# Patient Record
Sex: MALE | URBAN - METROPOLITAN AREA
[De-identification: names, ages, dates, MRNs, and addresses within clinical notes are randomized per-mention and may not be internally consistent; named-entity substitution may affect disease eponyms.]

---

## 2019-12-06 ENCOUNTER — IMPORTED ENCOUNTER (OUTPATIENT)
Dept: URBAN - METROPOLITAN AREA CLINIC 9 | Facility: CLINIC | Age: 69
End: 2019-12-06

## 2021-02-22 ENCOUNTER — IMPORTED ENCOUNTER (OUTPATIENT)
Dept: URBAN - METROPOLITAN AREA CLINIC 9 | Facility: CLINIC | Age: 71
End: 2021-02-22

## 2021-10-18 ASSESSMENT — VISUAL ACUITY
OD_CC: 20/70 SN
OS_CC: 20/20 SN
OD_CC: 20/50 SN
OS_CC: 20/50 SN
OD_CC: 20/40 -2 SN
OS_CC: 20/25 -2 SN

## 2021-10-18 ASSESSMENT — TONOMETRY
OS_IOP_MMHG: 5
OD_IOP_MMHG: 12
OD_IOP_MMHG: 5
OS_IOP_MMHG: 14

## 2022-02-07 NOTE — PATIENT DISCUSSION
The patient was informed that with [address] [facility] for distance, they will need glasses for all near and intermediate activities after surgery. The patient understands there is a possibility they may need an enhancement after surgery. The patient will consult with Dr. Srinivas Tran and let us know if he wants to proceed with cataract surgery.

## 2022-02-07 NOTE — PATIENT DISCUSSION
Continue monitoring with Dr. Lukas Horton. The patient was advised that their best potential vision after cataract surgery will still be limited due to the macular degeneration.

## 2022-06-18 RX ORDER — LISINOPRIL AND HYDROCHLOROTHIAZIDE 25; 20 MG/1; MG/1
TABLET ORAL
COMMUNITY

## 2022-06-18 RX ORDER — ATENOLOL 25 MG/1
TABLET ORAL
COMMUNITY

## 2022-10-10 PROBLEM — G56.01 RIGHT CARPAL TUNNEL SYNDROME: Status: ACTIVE | Noted: 2022-10-10

## 2022-10-10 PROBLEM — M79.641 RIGHT HAND PAIN: Status: ACTIVE | Noted: 2022-10-10

## 2022-11-29 NOTE — PATIENT DISCUSSION
Continue monitoring with Dr. Mynor Catherine. The patient was advised that their best potential vision after cataract surgery will still be limited due to the macular degeneration.

## 2022-12-19 NOTE — PATIENT DISCUSSION
Continue monitoring with Dr. Usama Ascencio. The patient was advised that their best potential vision after cataract surgery will still be limited due to the macular degeneration.

## 2022-12-20 NOTE — PATIENT DISCUSSION
The patient was informed that with [address] [facility] for distance, they will need glasses for all near and intermediate activities after surgery. The patient understands there is a possibility they may need an enhancement after surgery. The patient elects [employer] [facility], goal of emmetropia.

## 2022-12-21 NOTE — PATIENT DISCUSSION
Lipid abnormalities are improving with lifestyle modifications.  Nutritional counseling was provided.  Lipids will be reassessed in 3 months.   The patient feels that the cataract is significantly impacting daily activities and has elected cataract surgery. The risks, benefits, and alternatives to surgery were discussed. The patient elects to proceed with surgery.

## 2022-12-21 NOTE — PATIENT DISCUSSION
The patient was informed that with [address] [facility] for distance, they will need glasses for all near and intermediate activities after surgery. The patient understands there is a possibility they may need an enhancement after surgery. The patient elects [employer] [facility], goal of emmetropia.

## 2023-04-05 DIAGNOSIS — F32.A DEPRESSION, UNSPECIFIED: ICD-10-CM

## 2023-04-05 DIAGNOSIS — N32.81 OVERACTIVE BLADDER: ICD-10-CM

## 2023-04-05 DIAGNOSIS — N40.0 BENIGN PROSTATIC HYPERPLASIA WITHOUT LOWER URINARY TRACT SYMPTOMS: ICD-10-CM

## 2023-04-05 DIAGNOSIS — G47.9 SLEEP DISORDER, UNSPECIFIED: ICD-10-CM

## 2023-04-05 RX ORDER — TIZANIDINE 2 MG/1
TABLET ORAL
COMMUNITY
Start: 2022-08-02 | End: 2023-05-31 | Stop reason: WASHOUT

## 2023-04-05 RX ORDER — TRAZODONE HYDROCHLORIDE 100 MG/1
TABLET ORAL
Qty: 90 TABLET | Refills: 3 | Status: SHIPPED | OUTPATIENT
Start: 2023-04-05 | End: 2023-06-01 | Stop reason: SDUPTHER

## 2023-04-05 RX ORDER — CETIRIZINE HYDROCHLORIDE 10 MG/1
1 TABLET ORAL DAILY
COMMUNITY
Start: 2022-02-21 | End: 2023-05-31 | Stop reason: WASHOUT

## 2023-04-05 RX ORDER — AMITRIPTYLINE HYDROCHLORIDE 25 MG/1
25 TABLET, FILM COATED ORAL NIGHTLY
COMMUNITY
Start: 2023-01-19 | End: 2023-08-31 | Stop reason: WASHOUT

## 2023-04-05 RX ORDER — MIRABEGRON 50 MG/1
1 TABLET, FILM COATED, EXTENDED RELEASE ORAL DAILY
COMMUNITY
Start: 2020-07-02 | End: 2023-05-31 | Stop reason: WASHOUT

## 2023-04-05 RX ORDER — ALFUZOSIN HYDROCHLORIDE 10 MG/1
TABLET, EXTENDED RELEASE ORAL
Qty: 90 TABLET | Refills: 1 | Status: SHIPPED | OUTPATIENT
Start: 2023-04-05 | End: 2023-05-31 | Stop reason: WASHOUT

## 2023-04-05 RX ORDER — ATORVASTATIN CALCIUM 40 MG/1
1 TABLET, FILM COATED ORAL DAILY
COMMUNITY
Start: 2019-01-10 | End: 2023-12-26 | Stop reason: ALTCHOICE

## 2023-04-05 RX ORDER — FLUOXETINE HYDROCHLORIDE 40 MG/1
1 CAPSULE ORAL DAILY
COMMUNITY
Start: 2015-07-22 | End: 2023-05-31 | Stop reason: WASHOUT

## 2023-04-05 RX ORDER — ASPIRIN 81 MG/1
1 TABLET ORAL DAILY
COMMUNITY
Start: 2022-12-14

## 2023-04-05 RX ORDER — GABAPENTIN 100 MG/1
1 CAPSULE ORAL NIGHTLY
COMMUNITY
Start: 2022-09-27 | End: 2024-02-01

## 2023-04-05 RX ORDER — SERTRALINE HYDROCHLORIDE 50 MG/1
TABLET, FILM COATED ORAL
Qty: 30 TABLET | Refills: 3 | Status: SHIPPED | OUTPATIENT
Start: 2023-04-05 | End: 2023-06-01 | Stop reason: SDUPTHER

## 2023-04-05 RX ORDER — FINASTERIDE 5 MG/1
TABLET, FILM COATED ORAL
Qty: 90 TABLET | Refills: 3 | Status: SHIPPED | OUTPATIENT
Start: 2023-04-05 | End: 2023-05-31 | Stop reason: WASHOUT

## 2023-04-05 RX ORDER — MELOXICAM 7.5 MG/1
TABLET ORAL
COMMUNITY
End: 2023-05-31 | Stop reason: WASHOUT

## 2023-04-05 RX ORDER — FINASTERIDE 5 MG/1
1 TABLET, FILM COATED ORAL DAILY
COMMUNITY
Start: 2016-05-31 | End: 2023-10-18 | Stop reason: SDUPTHER

## 2023-04-05 RX ORDER — ALFUZOSIN HYDROCHLORIDE 10 MG/1
1 TABLET, EXTENDED RELEASE ORAL DAILY
COMMUNITY
Start: 2016-05-31 | End: 2023-05-19 | Stop reason: SDUPTHER

## 2023-04-05 RX ORDER — TRAZODONE HYDROCHLORIDE 100 MG/1
1 TABLET ORAL NIGHTLY
COMMUNITY
Start: 2014-05-13 | End: 2023-05-31 | Stop reason: WASHOUT

## 2023-04-05 RX ORDER — ERGOCALCIFEROL 1.25 MG/1
1 CAPSULE ORAL WEEKLY
COMMUNITY
Start: 2014-06-03 | End: 2023-05-31 | Stop reason: WASHOUT

## 2023-04-05 RX ORDER — AMITRIPTYLINE HYDROCHLORIDE 25 MG/1
TABLET, FILM COATED ORAL
Qty: 30 TABLET | Refills: 3 | Status: SHIPPED | OUTPATIENT
Start: 2023-04-05 | End: 2023-05-31 | Stop reason: WASHOUT

## 2023-04-05 RX ORDER — SERTRALINE HYDROCHLORIDE 50 MG/1
1 TABLET, FILM COATED ORAL DAILY
COMMUNITY
Start: 2023-02-21 | End: 2023-05-19 | Stop reason: SDUPTHER

## 2023-04-28 ENCOUNTER — ESTABLISHED PATIENT (OUTPATIENT)
Dept: URBAN - METROPOLITAN AREA CLINIC 6 | Facility: CLINIC | Age: 73
End: 2023-04-28

## 2023-04-28 DIAGNOSIS — E11.9: ICD-10-CM

## 2023-04-28 DIAGNOSIS — H25.13: ICD-10-CM

## 2023-04-28 PROCEDURE — 92014 COMPRE OPH EXAM EST PT 1/>: CPT

## 2023-04-28 ASSESSMENT — VISUAL ACUITY
OU_CC: J2
OD_PH: 20/40
OD_CC: 20/70-1
OD_CC: J5
OS_PH: 20/40-2
OS_CC: J3
OS_CC: 20/80-1

## 2023-04-28 ASSESSMENT — TONOMETRY
OS_IOP_MMHG: 17
OD_IOP_MMHG: 17

## 2023-05-08 ENCOUNTER — PRE-OP/H&P (OUTPATIENT)
Dept: URBAN - METROPOLITAN AREA CLINIC 6 | Facility: CLINIC | Age: 73
End: 2023-05-08

## 2023-05-08 DIAGNOSIS — H25.13: ICD-10-CM

## 2023-05-08 PROCEDURE — 99211PRE PRE OP VISIT

## 2023-05-08 PROCEDURE — 92136 OPHTHALMIC BIOMETRY: CPT

## 2023-05-19 DIAGNOSIS — F41.9 ANXIETY: ICD-10-CM

## 2023-05-19 DIAGNOSIS — N40.0 BENIGN PROSTATIC HYPERPLASIA, UNSPECIFIED WHETHER LOWER URINARY TRACT SYMPTOMS PRESENT: ICD-10-CM

## 2023-05-19 RX ORDER — ALFUZOSIN HYDROCHLORIDE 10 MG/1
10 TABLET, EXTENDED RELEASE ORAL DAILY
Qty: 90 TABLET | Refills: 1 | Status: SHIPPED | OUTPATIENT
Start: 2023-05-19 | End: 2023-06-01 | Stop reason: SDUPTHER

## 2023-05-19 RX ORDER — SERTRALINE HYDROCHLORIDE 50 MG/1
50 TABLET, FILM COATED ORAL DAILY
Qty: 90 TABLET | Refills: 1 | Status: SHIPPED | OUTPATIENT
Start: 2023-05-19 | End: 2023-05-31 | Stop reason: WASHOUT

## 2023-05-31 ENCOUNTER — OFFICE VISIT (OUTPATIENT)
Dept: PRIMARY CARE | Facility: CLINIC | Age: 73
End: 2023-05-31
Payer: MEDICARE

## 2023-05-31 VITALS
HEART RATE: 90 BPM | HEIGHT: 71 IN | WEIGHT: 280.2 LBS | TEMPERATURE: 98.6 F | BODY MASS INDEX: 39.23 KG/M2 | DIASTOLIC BLOOD PRESSURE: 70 MMHG | OXYGEN SATURATION: 93 % | SYSTOLIC BLOOD PRESSURE: 122 MMHG

## 2023-05-31 DIAGNOSIS — R42 VERTIGO: Primary | ICD-10-CM

## 2023-05-31 DIAGNOSIS — N39.490 OVERFLOW INCONTINENCE OF URINE: ICD-10-CM

## 2023-05-31 PROCEDURE — 99214 OFFICE O/P EST MOD 30 MIN: CPT | Performed by: PHYSICIAN ASSISTANT

## 2023-05-31 PROCEDURE — 1036F TOBACCO NON-USER: CPT | Performed by: PHYSICIAN ASSISTANT

## 2023-05-31 PROCEDURE — 1159F MED LIST DOCD IN RCRD: CPT | Performed by: PHYSICIAN ASSISTANT

## 2023-05-31 PROCEDURE — 1160F RVW MEDS BY RX/DR IN RCRD: CPT | Performed by: PHYSICIAN ASSISTANT

## 2023-05-31 PROCEDURE — 3008F BODY MASS INDEX DOCD: CPT | Performed by: PHYSICIAN ASSISTANT

## 2023-05-31 RX ORDER — OXYBUTYNIN CHLORIDE 5 MG/1
5 TABLET ORAL 2 TIMES DAILY
Qty: 60 TABLET | Refills: 5 | Status: SHIPPED | OUTPATIENT
Start: 2023-05-31 | End: 2023-10-18 | Stop reason: SDUPTHER

## 2023-05-31 NOTE — PATIENT INSTRUCTIONS
Return for medicare annual visit.      Ways to Help Prevent Falls at Home    Quick Tips   ? Ask for help if you need it. Most people want to help!   ? Get up slowly after sitting or laying down   ? Wear a medical alert device or keep cell phone in your pocket   ? Use night lights, especially areas near a bathroom   ? Keep the items you use often within reach on a small stool or end table   ? Use an assistive device such as walker or cane, as directed by provider/physical therapy   ? Use a non-slip mat and grab bars in your bathroom. Look for home health sections for best options     Other Areas to Focus On   ? Exercise and nutrition: Regular exercise or taking a falls prevention class are great ways improve strength and balance. Don’t forget to stay hydrated and bring a snack!   ? Medicine side effects: Some medicines can make you sleepy or dizzy, which could cause a fall. Ask your healthcare provider about the side effects your medicines could cause. Be sure to let them know if you take any vitamins or supplements as well.   ? Tripping hazards: Remove items you could trip on, such as loose mats, rugs, cords, and clutter. Wear closed toe shoes with rubber soles.   ? Health and wellness: Get regular checkups with your healthcare provider, plus routine vision and hearing screenings. Talk with your healthcare provider about:   o Your medicines and the possible side effects - bring them in a bag if that is easier!   o Problems with balance or feeling dizzy   o Ways to promote bone health, such as Vitamin D and calcium supplements   o Questions or concerns about falling     *Ask your healthcare team if you have questions     [facility], 2022

## 2023-05-31 NOTE — PROGRESS NOTES
"Subjective   Patient ID: Vincent Ramirez is a 73 y.o. male who presents for New Patient Visit (Establish care), Shoulder Pain (Left shoulder ), and Balance issues  (Has been falliig).    HPI   Prefers to be called \"Car\"    Hx Nonhodgkin's lymphoma stage- resolved, concerned about weight gain & balance issues. He has had issues with balance, but denies sensation of spinning or rocking since chemo treatments. He states he squats down or bends to work on a car etc and sometimes tips over unexpectedly without realizing it.    Car eats a healthy diet with fruits and vegetables and minimal lean meats.     Sees pain mgt for neck/shoulder pain. On Gabapentin 100mg at bedtimefrom pain mgt- doesn't feel that this is very effective.      Dyslipidemia- lipitor  Depression- sertraline  Insomnia- trazodone  DM2- diet- no meds, monitor a1C  HTN- diet, monitor       Review of Systems  Review of Systems:   Constitutional: Denies fever  HEENT: Denies ST, earache  CVS: Denies Chest pain  Pulmonary: Denies wheezing, SOB  GI: Denies N/V  : Denies dysuria  Musculoskeletal:  Denies myalgia  Neuro: Denies focal weakness or numbness.  Skin: Denies Rashes.  *Review of Systems is negative unless otherwise mentioned in HPI or ROS above.    Objective   /70   Pulse 90   Temp 37 °C (98.6 °F)   Ht 1.803 m (5' 11\")   Wt 127 kg (280 lb 3.2 oz)   SpO2 93%   BMI 39.08 kg/m²     Physical Exam  Physical exam:  /70   Pulse 90   Temp 37 °C (98.6 °F)   Ht 1.803 m (5' 11\")   Wt 127 kg (280 lb 3.2 oz)   SpO2 93%   BMI 39.08 kg/m²  reviewed   Body mass index is 39.08 kg/m².     Constitutional: NAD.  Resting comfortably.  Head: Atraumatic, normocephalic.  EENT: deferred d/t masking  Cardiac: Regular rate & rhythm.   Pulmonary: Lungs clear bilat  GI: Rotund, Soft, Nontender, nondistended.   Musculoskeletal: No peripheral edema.   Skin: No evidence of trauma. No rashes  Psych: Intact judgement and insight.    Assessment/Plan "   Problem List Items Addressed This Visit    None  Visit Diagnoses       Vertigo    -  Primary    Relevant Orders    Referral to Physical Therapy    Overflow incontinence of urine        Relevant Medications    oxybutynin (Ditropan) 5 mg tablet             Patient was identified as a fall risk. Risk prevention instructions provided.

## 2023-06-01 ENCOUNTER — TELEPHONE (OUTPATIENT)
Dept: PRIMARY CARE | Facility: CLINIC | Age: 73
End: 2023-06-01
Payer: MEDICARE

## 2023-06-01 DIAGNOSIS — N40.0 BENIGN PROSTATIC HYPERPLASIA, UNSPECIFIED WHETHER LOWER URINARY TRACT SYMPTOMS PRESENT: ICD-10-CM

## 2023-06-01 DIAGNOSIS — F32.A DEPRESSION, UNSPECIFIED: ICD-10-CM

## 2023-06-01 DIAGNOSIS — G47.9 SLEEP DISORDER, UNSPECIFIED: ICD-10-CM

## 2023-06-01 RX ORDER — ALFUZOSIN HYDROCHLORIDE 10 MG/1
10 TABLET, EXTENDED RELEASE ORAL DAILY
Qty: 90 TABLET | Refills: 1 | Status: SHIPPED | OUTPATIENT
Start: 2023-06-01 | End: 2023-09-06 | Stop reason: SDUPTHER

## 2023-06-01 RX ORDER — TRAZODONE HYDROCHLORIDE 100 MG/1
100 TABLET ORAL NIGHTLY
Qty: 90 TABLET | Refills: 1 | Status: SHIPPED | OUTPATIENT
Start: 2023-06-01 | End: 2023-11-28 | Stop reason: SDUPTHER

## 2023-06-01 RX ORDER — SERTRALINE HYDROCHLORIDE 50 MG/1
50 TABLET, FILM COATED ORAL DAILY
Qty: 90 TABLET | Refills: 1 | Status: SHIPPED | OUTPATIENT
Start: 2023-06-01 | End: 2023-12-26 | Stop reason: ALTCHOICE

## 2023-07-06 ENCOUNTER — POST-OP (OUTPATIENT)
Dept: URBAN - METROPOLITAN AREA CLINIC 6 | Facility: CLINIC | Age: 73
End: 2023-07-06

## 2023-07-06 DIAGNOSIS — Z96.1: ICD-10-CM

## 2023-07-06 DIAGNOSIS — H25.11: ICD-10-CM

## 2023-07-06 PROCEDURE — 99024 POSTOP FOLLOW-UP VISIT: CPT

## 2023-07-06 PROCEDURE — 92136 OPHTHALMIC BIOMETRY: CPT

## 2023-07-06 ASSESSMENT — TONOMETRY: OS_IOP_MMHG: 10

## 2023-07-06 ASSESSMENT — VISUAL ACUITY: OS_SC: 20/30-2

## 2023-08-02 ENCOUNTER — TELEPHONE (OUTPATIENT)
Dept: PRIMARY CARE | Facility: CLINIC | Age: 73
End: 2023-08-02
Payer: MEDICARE

## 2023-08-02 DIAGNOSIS — H04.129 DRY EYE: Primary | ICD-10-CM

## 2023-08-08 LAB
AMPHETAMINE (PRESENCE) IN URINE BY SCREEN METHOD: NORMAL
BARBITURATES PRESENCE IN URINE BY SCREEN METHOD: NORMAL
BENZODIAZEPINE (PRESENCE) IN URINE BY SCREEN METHOD: NORMAL
CANNABINOIDS IN URINE BY SCREEN METHOD: NORMAL
COCAINE (PRESENCE) IN URINE BY SCREEN METHOD: NORMAL
DRUG SCREEN COMMENT URINE: NORMAL
FENTANYL URINE: NORMAL
METHADONE (PRESENCE) IN URINE BY SCREEN METHOD: NORMAL
OPIATES (PRESENCE) IN URINE BY SCREEN METHOD: NORMAL
OXYCODONE (PRESENCE) IN URINE BY SCREEN METHOD: NORMAL
PHENCYCLIDINE (PRESENCE) IN URINE BY SCREEN METHOD: NORMAL

## 2023-08-10 ENCOUNTER — POST-OP (OUTPATIENT)
Dept: URBAN - METROPOLITAN AREA SURGERY 6 | Facility: SURGERY | Age: 73
End: 2023-08-10

## 2023-08-10 DIAGNOSIS — Z96.1: ICD-10-CM

## 2023-08-10 PROCEDURE — 99024 POSTOP FOLLOW-UP VISIT: CPT

## 2023-08-10 ASSESSMENT — TONOMETRY: OD_IOP_MMHG: 10

## 2023-08-10 ASSESSMENT — VISUAL ACUITY
OD_SC: 20/60
OD_PH: 20/50

## 2023-08-21 ENCOUNTER — APPOINTMENT (OUTPATIENT)
Dept: PRIMARY CARE | Facility: CLINIC | Age: 73
End: 2023-08-21
Payer: MEDICARE

## 2023-08-28 ENCOUNTER — POST-OP (OUTPATIENT)
Dept: URBAN - METROPOLITAN AREA CLINIC 10 | Facility: CLINIC | Age: 73
End: 2023-08-28

## 2023-08-28 DIAGNOSIS — Z96.1: ICD-10-CM

## 2023-08-28 PROCEDURE — 92015 DETERMINE REFRACTIVE STATE: CPT

## 2023-08-28 PROCEDURE — 99024 POSTOP FOLLOW-UP VISIT: CPT

## 2023-08-28 ASSESSMENT — TONOMETRY
OD_IOP_MMHG: 5
OS_IOP_MMHG: 6

## 2023-08-28 ASSESSMENT — VISUAL ACUITY
OD_SC: 20/50
OS_SC: 20/30

## 2023-08-30 PROBLEM — K21.00 REFLUX ESOPHAGITIS: Status: ACTIVE | Noted: 2023-08-30

## 2023-08-30 PROBLEM — I10 BENIGN ESSENTIAL HYPERTENSION: Status: ACTIVE | Noted: 2023-08-30

## 2023-08-30 PROBLEM — M76.60 ACHILLES TENDINITIS: Status: RESOLVED | Noted: 2023-08-30 | Resolved: 2023-08-30

## 2023-08-30 PROBLEM — C85.90 NON HODGKIN'S LYMPHOMA (MULTI): Status: RESOLVED | Noted: 2023-08-30 | Resolved: 2023-08-30

## 2023-08-30 PROBLEM — N32.81 OVERACTIVE BLADDER: Status: ACTIVE | Noted: 2023-08-30

## 2023-08-30 PROBLEM — Z85.72 HISTORY OF NON-HODGKIN'S LYMPHOMA: Status: ACTIVE | Noted: 2023-08-30

## 2023-08-30 PROBLEM — N40.0 BPH (BENIGN PROSTATIC HYPERPLASIA): Status: ACTIVE | Noted: 2023-08-30

## 2023-08-30 PROBLEM — I44.7 LEFT BUNDLE BRANCH BLOCK (LBBB): Status: RESOLVED | Noted: 2023-08-30 | Resolved: 2023-08-30

## 2023-08-30 PROBLEM — R37 SEXUAL DYSFUNCTION: Status: ACTIVE | Noted: 2023-08-30

## 2023-08-30 PROBLEM — R97.20 ELEVATED PSA: Status: ACTIVE | Noted: 2023-08-30

## 2023-08-30 PROBLEM — G89.29 CHRONIC BACK PAIN: Status: ACTIVE | Noted: 2023-08-30

## 2023-08-30 PROBLEM — Z98.84 BARIATRIC SURGERY STATUS: Status: ACTIVE | Noted: 2023-08-30

## 2023-08-30 PROBLEM — G47.33 OSA (OBSTRUCTIVE SLEEP APNEA): Status: ACTIVE | Noted: 2023-08-30

## 2023-08-30 PROBLEM — E78.5 DYSLIPIDEMIA: Status: ACTIVE | Noted: 2023-08-30

## 2023-08-30 PROBLEM — E55.9 VITAMIN D DEFICIENCY: Status: ACTIVE | Noted: 2023-08-30

## 2023-08-30 PROBLEM — M54.17 LUMBOSACRAL NEURITIS: Status: ACTIVE | Noted: 2023-08-30

## 2023-08-30 PROBLEM — M54.9 CHRONIC BACK PAIN: Status: ACTIVE | Noted: 2023-08-30

## 2023-08-30 PROBLEM — F32.1 CURRENT MODERATE EPISODE OF MAJOR DEPRESSIVE DISORDER WITHOUT PRIOR EPISODE (MULTI): Status: ACTIVE | Noted: 2023-08-30

## 2023-08-30 PROBLEM — M54.2 CERVICALGIA: Status: ACTIVE | Noted: 2023-08-30

## 2023-08-30 PROBLEM — M51.26 DISC DISPLACEMENT, LUMBAR: Status: ACTIVE | Noted: 2023-08-30

## 2023-08-31 ENCOUNTER — OFFICE VISIT (OUTPATIENT)
Dept: PRIMARY CARE | Facility: CLINIC | Age: 73
End: 2023-08-31
Payer: MEDICARE

## 2023-08-31 VITALS
TEMPERATURE: 97.6 F | BODY MASS INDEX: 38.36 KG/M2 | HEIGHT: 71 IN | WEIGHT: 274 LBS | OXYGEN SATURATION: 96 % | SYSTOLIC BLOOD PRESSURE: 112 MMHG | HEART RATE: 76 BPM | DIASTOLIC BLOOD PRESSURE: 58 MMHG

## 2023-08-31 DIAGNOSIS — I10 BENIGN ESSENTIAL HYPERTENSION: ICD-10-CM

## 2023-08-31 DIAGNOSIS — I73.89 OTHER SPECIFIED PERIPHERAL VASCULAR DISEASES (CMS-HCC): ICD-10-CM

## 2023-08-31 DIAGNOSIS — Z98.84 BARIATRIC SURGERY STATUS: ICD-10-CM

## 2023-08-31 DIAGNOSIS — Z00.00 ROUTINE GENERAL MEDICAL EXAMINATION AT HEALTH CARE FACILITY: Primary | ICD-10-CM

## 2023-08-31 DIAGNOSIS — F32.1 CURRENT MODERATE EPISODE OF MAJOR DEPRESSIVE DISORDER WITHOUT PRIOR EPISODE (MULTI): ICD-10-CM

## 2023-08-31 DIAGNOSIS — E11.9 TYPE 2 DIABETES MELLITUS WITHOUT COMPLICATION, WITHOUT LONG-TERM CURRENT USE OF INSULIN (MULTI): ICD-10-CM

## 2023-08-31 DIAGNOSIS — E66.01 OBESITY, MORBID (MULTI): ICD-10-CM

## 2023-08-31 DIAGNOSIS — R45.84 ANHEDONIA: ICD-10-CM

## 2023-08-31 DIAGNOSIS — M81.0 OSTEOPOROSIS: ICD-10-CM

## 2023-08-31 PROCEDURE — 1126F AMNT PAIN NOTED NONE PRSNT: CPT | Performed by: PHYSICIAN ASSISTANT

## 2023-08-31 PROCEDURE — 1160F RVW MEDS BY RX/DR IN RCRD: CPT | Performed by: PHYSICIAN ASSISTANT

## 2023-08-31 PROCEDURE — 1170F FXNL STATUS ASSESSED: CPT | Performed by: PHYSICIAN ASSISTANT

## 2023-08-31 PROCEDURE — 3074F SYST BP LT 130 MM HG: CPT | Performed by: PHYSICIAN ASSISTANT

## 2023-08-31 PROCEDURE — 1036F TOBACCO NON-USER: CPT | Performed by: PHYSICIAN ASSISTANT

## 2023-08-31 PROCEDURE — 1159F MED LIST DOCD IN RCRD: CPT | Performed by: PHYSICIAN ASSISTANT

## 2023-08-31 PROCEDURE — 3078F DIAST BP <80 MM HG: CPT | Performed by: PHYSICIAN ASSISTANT

## 2023-08-31 PROCEDURE — 3008F BODY MASS INDEX DOCD: CPT | Performed by: PHYSICIAN ASSISTANT

## 2023-08-31 PROCEDURE — G0439 PPPS, SUBSEQ VISIT: HCPCS | Performed by: PHYSICIAN ASSISTANT

## 2023-08-31 RX ORDER — BUPROPION HYDROCHLORIDE 150 MG/1
150 TABLET ORAL EVERY MORNING
Qty: 90 TABLET | Refills: 0 | Status: SHIPPED | OUTPATIENT
Start: 2023-08-31 | End: 2023-10-18 | Stop reason: SINTOL

## 2023-08-31 RX ORDER — PIOGLITAZONEHYDROCHLORIDE 15 MG/1
15 TABLET ORAL DAILY
Qty: 90 TABLET | Refills: 3 | Status: SHIPPED | OUTPATIENT
Start: 2023-08-31 | End: 2023-10-18 | Stop reason: SINTOL

## 2023-08-31 ASSESSMENT — ACTIVITIES OF DAILY LIVING (ADL)
GROCERY_SHOPPING: INDEPENDENT
BATHING: INDEPENDENT
MANAGING_FINANCES: INDEPENDENT
DOING_HOUSEWORK: INDEPENDENT
DRESSING: INDEPENDENT
TAKING_MEDICATION: INDEPENDENT

## 2023-08-31 ASSESSMENT — PATIENT HEALTH QUESTIONNAIRE - PHQ9
SUM OF ALL RESPONSES TO PHQ9 QUESTIONS 1 AND 2: 0
2. FEELING DOWN, DEPRESSED OR HOPELESS: NOT AT ALL
1. LITTLE INTEREST OR PLEASURE IN DOING THINGS: NOT AT ALL

## 2023-08-31 NOTE — PROGRESS NOTES
Subjective     HPI   Vincent Ramirez is a 73 y.o. year old male patient with presenting to clinic with concern for   Chief Complaint   Patient presents with    Medicare Annual Wellness Visit Subsequent    Weight Loss     Would like to talk about something for weight loss. No motivation. Would like some labs done.      Need smoking history- LDCT??     Quit smoking >25yrs ago.  Due for DEXA for osteopenia      Due for A1c- diet controlled DM, check micro.      Concerned anbout weight. Needs help losing weight    Referred to physical therapy at last visit with concern for falls d/t balance issues related to chemo treatments for Non-Hodgkins Lymphoma (in remission).-- try meclizine?     Sees pain mgt for neck/shoulder pain. Has been seeing Carlene Uriarte.      Dyslipidemia- lipitor  Depression- sertraline  Insomnia- trazodone  DM2- diet- no meds, monitor a1C  HTN- diet, monitor       Patient Active Problem List   Diagnosis    Current moderate episode of major depressive disorder without prior episode (CMS/HCC)    Chronic back pain    Cervicalgia    BPH (benign prostatic hyperplasia)    Benign essential hypertension    Bariatric surgery status    Disc displacement, lumbar    Dyslipidemia    Elevated PSA    History of non-Hodgkin's lymphoma    Lumbosacral neuritis    VALDEZ (obstructive sleep apnea)    Sexual dysfunction    Reflux esophagitis    Overactive bladder    Vitamin D deficiency    Other specified peripheral vascular diseases (CMS/HCC)    Obesity, morbid (CMS/HCC)       Past Medical History:   Diagnosis Date    Personal history of non-Hodgkin lymphomas 02/13/2014    History of non-Hodgkin's lymphoma      Past Surgical History:   Procedure Laterality Date    APPENDECTOMY  07/27/2021    Appendectomy    HAND SURGERY  02/13/2014    Hand Surgery                                                                                                                                                          HERNIA REPAIR  07/27/2021     Hernia Repair    OTHER SURGICAL HISTORY  02/13/2014    Nasal Endoscopy Polypectomy    OTHER SURGICAL HISTORY  07/27/2021    Throat Surgery    TONSILLECTOMY  07/27/2021    Tonsillectomy      No family history on file.   Social History     Tobacco Use    Smoking status: Never    Smokeless tobacco: Never   Substance Use Topics    Alcohol use: Yes     Comment: sometimes        Current Outpatient Medications:     alfuzosin (Uroxatral) 10 mg 24 hr tablet, Take 1 tablet (10 mg) by mouth once daily., Disp: 90 tablet, Rfl: 1    aspirin 81 mg EC tablet, Take 1 tablet (81 mg) by mouth once daily., Disp: , Rfl:     atorvastatin (Lipitor) 40 mg tablet, Take 1 tablet (40 mg) by mouth once daily., Disp: , Rfl:     finasteride (Proscar) 5 mg tablet, Take 1 tablet (5 mg) by mouth once daily., Disp: , Rfl:     gabapentin (Neurontin) 100 mg capsule, Take 1 capsule (100 mg) by mouth once daily at bedtime., Disp: , Rfl:     oxybutynin (Ditropan) 5 mg tablet, Take 1 tablet (5 mg) by mouth 2 times a day., Disp: 60 tablet, Rfl: 5    sertraline (Zoloft) 50 mg tablet, Take 1 tablet (50 mg) by mouth once daily., Disp: 90 tablet, Rfl: 1    traZODone (Desyrel) 100 mg tablet, Take 1 tablet (100 mg) by mouth once daily at bedtime., Disp: 90 tablet, Rfl: 1    buPROPion XL (Wellbutrin XL) 150 mg 24 hr tablet, Take 1 tablet (150 mg) by mouth once daily in the morning. Do not crush, chew, or split., Disp: 90 tablet, Rfl: 0    pioglitazone (Actos) 15 mg tablet, Take 1 tablet (15 mg) by mouth once daily., Disp: 90 tablet, Rfl: 3     Review of Systems  Constitutional: Denies fever  HEENT: Denies ST, earache  CVS: Denies Chest pain  Pulmonary: Denies wheezing, SOB  GI: Denies N/V  : Denies dysuria  Musculoskeletal:  Denies myalgia  Neuro: Denies focal weakness or numbness.  Skin: Denies Rashes.  *Review of Systems is negative unless otherwise mentioned in HPI or ROS above.    Objective   /58   Pulse 76   Temp 36.4 °C (97.6 °F)   Ht 1.803 m (5'  "11\")   Wt 124 kg (274 lb)   SpO2 96%   BMI 38.22 kg/m²  reviewed Body mass index is 38.22 kg/m².     Physical Exam  Constitutional: NAD. Afebrile. Resting comfortably.  ENT: Nasal mucosa and oropharynx: moist oral mucosa. Posterior oropharynx nonerythematous. No posterior pharyngeal streaking.  TM: Bilat TM nonerythematous, pearly grey, landmarks intact. EAC wnl bilat.  Eyes: PERRLA. EOM intact.   Lymph: No anterior cervical chain or submandibular lymphadenopathy. No sentinel lymph nodes.  Cardiac: Regular rate & rhythm. No murmur, gallops, or rubs.  Pulmonary: Lungs clear to auscultation bilaterally with good aeration. No wheezes, rhonchi, or rales.   GI: Soft, Nontender, nondistended. No guarding. Normal BS x4.  : No suprapubic tenderness. No CVA tenderness to percussion.   Musculoskeletal: No peripheral edema.   Skin: No evidence of trauma. No rashes  Neuro: No focal neuro deficits. Normal gait without assistive devices.  Psych: Intact judgement and insight.      .Assessment/Plan   Problem List Items Addressed This Visit       Current moderate episode of major depressive disorder without prior episode (CMS/HCC)    Benign essential hypertension    Relevant Orders    Referral to Nutrition Services    Bariatric surgery status    Relevant Orders    Referral to Nutrition Services    Other specified peripheral vascular diseases (CMS/[facility])     Continue statin         Obesity, morbid (CMS/[facility])     Referred to nutritionist. Monitoring A1c. Increase physical exercise.           Other Visit Diagnoses       Routine general medical examination at health care facility    -  Primary    Type 2 diabetes mellitus without complication, without long-term current use of insulin (CMS/HCC)        Relevant Medications    pioglitazone (Actos) 15 mg tablet    Other Relevant Orders    Referral to Nutrition Services    Osteoporosis        Relevant Orders    XR DEXA bone density    Anhedonia        Relevant Medications    buPROPion XL " (Wellbutrin XL) 150 mg 24 hr tablet    BMI 38.0-38.9,adult        Relevant Medications    buPROPion XL (Wellbutrin XL) 150 mg 24 hr tablet

## 2023-09-05 DIAGNOSIS — F32.1 CURRENT MODERATE EPISODE OF MAJOR DEPRESSIVE DISORDER WITHOUT PRIOR EPISODE (MULTI): ICD-10-CM

## 2023-09-05 RX ORDER — FLUOXETINE HYDROCHLORIDE 40 MG/1
40 CAPSULE ORAL
Qty: 90 CAPSULE | Refills: 0 | Status: SHIPPED | OUTPATIENT
Start: 2023-09-05 | End: 2023-10-18 | Stop reason: SINTOL

## 2023-09-05 RX ORDER — FLUOXETINE HYDROCHLORIDE 40 MG/1
40 CAPSULE ORAL
Qty: 90 CAPSULE | Refills: 0 | Status: CANCELLED | OUTPATIENT
Start: 2023-09-05

## 2023-09-05 RX ORDER — FLUOXETINE HYDROCHLORIDE 40 MG/1
40 CAPSULE ORAL
COMMUNITY
End: 2023-09-05 | Stop reason: SDUPTHER

## 2023-09-06 DIAGNOSIS — N40.0 BENIGN PROSTATIC HYPERPLASIA, UNSPECIFIED WHETHER LOWER URINARY TRACT SYMPTOMS PRESENT: ICD-10-CM

## 2023-09-06 RX ORDER — ALFUZOSIN HYDROCHLORIDE 10 MG/1
10 TABLET, EXTENDED RELEASE ORAL DAILY
Qty: 90 TABLET | Refills: 1 | Status: SHIPPED | OUTPATIENT
Start: 2023-09-06 | End: 2023-12-26 | Stop reason: SDUPTHER

## 2023-10-18 ENCOUNTER — OFFICE VISIT (OUTPATIENT)
Dept: PRIMARY CARE | Facility: CLINIC | Age: 73
End: 2023-10-18
Payer: MEDICARE

## 2023-10-18 VITALS
SYSTOLIC BLOOD PRESSURE: 110 MMHG | WEIGHT: 272.2 LBS | TEMPERATURE: 98.3 F | HEIGHT: 71 IN | HEART RATE: 69 BPM | BODY MASS INDEX: 38.11 KG/M2 | OXYGEN SATURATION: 96 % | DIASTOLIC BLOOD PRESSURE: 70 MMHG

## 2023-10-18 DIAGNOSIS — N39.490 OVERFLOW INCONTINENCE OF URINE: ICD-10-CM

## 2023-10-18 DIAGNOSIS — E55.9 VITAMIN D DEFICIENCY: ICD-10-CM

## 2023-10-18 DIAGNOSIS — Z23 FLU VACCINE NEED: ICD-10-CM

## 2023-10-18 DIAGNOSIS — E11.9 TYPE 2 DIABETES MELLITUS WITHOUT COMPLICATION, WITHOUT LONG-TERM CURRENT USE OF INSULIN (MULTI): Primary | ICD-10-CM

## 2023-10-18 DIAGNOSIS — N32.81 OVERACTIVE BLADDER: ICD-10-CM

## 2023-10-18 LAB — POC HEMOGLOBIN A1C: 6 % (ref 4.2–6.5)

## 2023-10-18 PROCEDURE — G0008 ADMIN INFLUENZA VIRUS VAC: HCPCS | Performed by: PHYSICIAN ASSISTANT

## 2023-10-18 PROCEDURE — 1160F RVW MEDS BY RX/DR IN RCRD: CPT | Performed by: PHYSICIAN ASSISTANT

## 2023-10-18 PROCEDURE — 3008F BODY MASS INDEX DOCD: CPT | Performed by: PHYSICIAN ASSISTANT

## 2023-10-18 PROCEDURE — 90662 IIV NO PRSV INCREASED AG IM: CPT | Performed by: PHYSICIAN ASSISTANT

## 2023-10-18 PROCEDURE — 1036F TOBACCO NON-USER: CPT | Performed by: PHYSICIAN ASSISTANT

## 2023-10-18 PROCEDURE — 3074F SYST BP LT 130 MM HG: CPT | Performed by: PHYSICIAN ASSISTANT

## 2023-10-18 PROCEDURE — 1126F AMNT PAIN NOTED NONE PRSNT: CPT | Performed by: PHYSICIAN ASSISTANT

## 2023-10-18 PROCEDURE — 3078F DIAST BP <80 MM HG: CPT | Performed by: PHYSICIAN ASSISTANT

## 2023-10-18 PROCEDURE — 1159F MED LIST DOCD IN RCRD: CPT | Performed by: PHYSICIAN ASSISTANT

## 2023-10-18 PROCEDURE — 99214 OFFICE O/P EST MOD 30 MIN: CPT | Performed by: PHYSICIAN ASSISTANT

## 2023-10-18 PROCEDURE — 83036 HEMOGLOBIN GLYCOSYLATED A1C: CPT | Performed by: PHYSICIAN ASSISTANT

## 2023-10-18 RX ORDER — ERGOCALCIFEROL 1.25 MG/1
50000 CAPSULE ORAL WEEKLY
COMMUNITY
Start: 2013-12-03 | End: 2023-10-18 | Stop reason: SDUPTHER

## 2023-10-18 RX ORDER — FINASTERIDE 5 MG/1
5 TABLET, FILM COATED ORAL DAILY
Qty: 90 TABLET | Refills: 1 | Status: SHIPPED | OUTPATIENT
Start: 2023-10-18 | End: 2023-12-26 | Stop reason: ALTCHOICE

## 2023-10-18 RX ORDER — ERGOCALCIFEROL 1.25 MG/1
50000 CAPSULE ORAL
Qty: 30 CAPSULE | Refills: 1 | Status: SHIPPED | OUTPATIENT
Start: 2023-10-18 | End: 2024-02-06 | Stop reason: SDUPTHER

## 2023-10-18 RX ORDER — OXYBUTYNIN CHLORIDE 5 MG/1
5 TABLET ORAL 2 TIMES DAILY
Qty: 60 TABLET | Refills: 5 | Status: SHIPPED | OUTPATIENT
Start: 2023-10-18 | End: 2023-12-26 | Stop reason: SDUPTHER

## 2023-10-18 ASSESSMENT — PATIENT HEALTH QUESTIONNAIRE - PHQ9
2. FEELING DOWN, DEPRESSED OR HOPELESS: NOT AT ALL
SUM OF ALL RESPONSES TO PHQ9 QUESTIONS 1 AND 2: 0
1. LITTLE INTEREST OR PLEASURE IN DOING THINGS: NOT AT ALL

## 2023-10-18 NOTE — PROGRESS NOTES
Subjective     HPI   Vincent Ramirez is a 73 y.o. year old male patient with presenting to clinic with concern for   Chief Complaint   Patient presents with    Follow-up     6 wk.    Medication Problem     Has questions.     Immunizations     Rsv questions      I do recommend RSV vaccine.     Patient states that he is walking daily. He needs to increase activity to lose weight. [facility] friends of [email or web address], and [employer].      Concerned about weight. Needs help losing weight- started on wellbutrin. Abdominal cramps, dyspepsia. Referred to nutritionist.     Referred to physical therapy at last visit with concern for falls d/t balance issues related to chemo treatments for Non-Hodgkins Lymphoma (in remission).-- try meclizine?     Sees pain mgt for neck/shoulder pain. Has been seeing Carlene Uriarte.      Dyslipidemia- lipitor  Depression- sertraline  Insomnia- trazodone  HTN- diet, monitor  DMII- not tolerating Actos d/t GI distress. Concern for renal issues unable to take metformin.          Patient Active Problem List   Diagnosis    Current moderate episode of major depressive disorder without prior episode (CMS/HCC)    Chronic back pain    Cervicalgia    BPH (benign prostatic hyperplasia)    Benign essential hypertension    Bariatric surgery status    Disc displacement, lumbar    Dyslipidemia    Elevated PSA    History of non-Hodgkin's lymphoma    Lumbosacral neuritis    VALDEZ (obstructive sleep apnea)    Sexual dysfunction    Reflux esophagitis    Overactive bladder    Vitamin D deficiency    Other specified peripheral vascular diseases (CMS/HCC)    Obesity, morbid (CMS/HCC)       Past Medical History:   Diagnosis Date    Personal history of non-Hodgkin lymphomas 02/13/2014    History of non-Hodgkin's lymphoma      Past Surgical History:   Procedure Laterality Date    APPENDECTOMY  07/27/2021    Appendectomy    HAND SURGERY  02/13/2014    Hand Surgery                                                                                                                                                           HERNIA REPAIR  07/27/2021    Hernia Repair    OTHER SURGICAL HISTORY  02/13/2014    Nasal Endoscopy Polypectomy    OTHER SURGICAL HISTORY  07/27/2021    Throat Surgery    TONSILLECTOMY  07/27/2021    Tonsillectomy      Family History   Problem Relation Name Age of Onset    No Known Problems Other        Social History     Tobacco Use    Smoking status: Never    Smokeless tobacco: Never   Substance Use Topics    Alcohol use: Yes     Comment: sometimes        Current Outpatient Medications:     alfuzosin (Uroxatral) 10 mg 24 hr tablet, Take 1 tablet (10 mg) by mouth once daily., Disp: 90 tablet, Rfl: 1    aspirin 81 mg EC tablet, Take 1 tablet (81 mg) by mouth once daily., Disp: , Rfl:     atorvastatin (Lipitor) 40 mg tablet, Take 1 tablet (40 mg) by mouth once daily., Disp: , Rfl:     buPROPion XL (Wellbutrin XL) 150 mg 24 hr tablet, Take 1 tablet (150 mg) by mouth once daily in the morning. Do not crush, chew, or split., Disp: 90 tablet, Rfl: 0    ergocalciferol (Vitamin D-2) 1.25 MG (58401 UT) capsule, Take 1 capsule (50,000 Units) by mouth once a week., Disp: , Rfl:     finasteride (Proscar) 5 mg tablet, Take 1 tablet (5 mg) by mouth once daily., Disp: , Rfl:     FLUoxetine (PROzac) 40 mg capsule, Take 1 capsule (40 mg) by mouth once daily., Disp: 90 capsule, Rfl: 0    gabapentin (Neurontin) 100 mg capsule, Take 1 capsule (100 mg) by mouth once daily at bedtime., Disp: , Rfl:     oxybutynin (Ditropan) 5 mg tablet, Take 1 tablet (5 mg) by mouth 2 times a day., Disp: 60 tablet, Rfl: 5    pioglitazone (Actos) 15 mg tablet, Take 1 tablet (15 mg) by mouth once daily., Disp: 90 tablet, Rfl: 3    sertraline (Zoloft) 50 mg tablet, Take 1 tablet (50 mg) by mouth once daily., Disp: 90 tablet, Rfl: 1    traZODone (Desyrel) 100 mg tablet, Take 1 tablet (100 mg) by mouth once daily at bedtime., Disp: 90  "tablet, Rfl: 1     Review of Systems  Constitutional: Denies fever  HEENT: Denies ST, earache  CVS: Denies Chest pain  Pulmonary: Denies wheezing, SOB  GI: Denies N/V  : Denies dysuria  Musculoskeletal:  Denies myalgia  Neuro: Denies focal weakness or numbness.  Skin: Denies Rashes.  *Review of Systems is negative unless otherwise mentioned in HPI or ROS above.    Objective   /70   Pulse 69   Temp 36.8 °C (98.3 °F)   Ht 1.803 m (5' 11\")   Wt 123 kg (272 lb 3.2 oz)   SpO2 96%   BMI 37.96 kg/m²  reviewed Body mass index is 37.96 kg/m².     Physical Exam  Constitutional: NAD.  Resting comfortably.  Head: Atraumatic, normocephalic.  ENT: Moist oral mucosa. Nasal mucosa wnl.   Cardiac: Regular rate & rhythm.   Pulmonary: Lungs clear bilat  GI: Soft, Nontender, nondistended.   Musculoskeletal: No peripheral edema.   Skin: No evidence of trauma. No rashes  Psych: Intact judgement and insight.    .Assessment/Plan       "

## 2023-11-28 DIAGNOSIS — G47.9 SLEEP DISORDER, UNSPECIFIED: ICD-10-CM

## 2023-11-28 RX ORDER — TRAZODONE HYDROCHLORIDE 100 MG/1
100 TABLET ORAL NIGHTLY
Qty: 90 TABLET | Refills: 1 | Status: SHIPPED | OUTPATIENT
Start: 2023-11-28 | End: 2023-12-26 | Stop reason: SDUPTHER

## 2023-11-30 ENCOUNTER — TELEPHONE (OUTPATIENT)
Dept: PRIMARY CARE | Facility: CLINIC | Age: 73
End: 2023-11-30
Payer: MEDICARE

## 2023-11-30 DIAGNOSIS — U07.1 COVID-19: ICD-10-CM

## 2023-11-30 DIAGNOSIS — U07.1 COVID-19: Primary | ICD-10-CM

## 2023-11-30 NOTE — TELEPHONE ENCOUNTER
High risk patient for COVID. Known household exposure to COVID.     Car is not a strong candidate for PAXLOVID d/t multiple medication interactions.  I recommend Lagevrio instead.     Medication is recommended after consideration of your COVID status and risk factors including age, medical history, immunization status, severity of disease being mild to moderate, and duration of illness. We discussed the proper use of this medication in helping prevent severe illness from developing secondary to COVID including requiring hospitalization and ICU admission. You may have comorbidities that would make them a good candidate for medication treatment of COVID including consideration of PAXLOVID or Lagevrio.    PAXLOVID is currently the only FDA approved oral medication to treat COVID-19. There are several known medication interactions with Paxlovid discussed. PAXLOVID is used at a lower dose for people with severe chronic kidney disease. I will review the medication list in detail. The most common side effects are upset stomach/diarrhea, odd taste    Lagevrio is currently under FDA Emergency Use Authorization as of October 2023 and risk vs benefit of PAXLOVID vs Lagevrio vs supportive care was considered as discussed. regarding options including antiviral infusions and/or monoclonal antibody therapy which are no longer recommended due to inefficacy. The most common side effects are upset stomach with nausea and diarrhea, dizziness and headache. Lagevrio is recommended for Car due to high risk patient with medical comorbidities and multiple medication interactions with PAXLOVID.          Please stay well hydrated. Practice deep breathing often, walking in the home throughout course of illness and DVT prevention, and symptom management with tylenol/motrin as needed (unless you've been told to avoid NSAIDs) and cough medication.   Go to ER if emergent/urgent symptoms occur such as chest pain, shortness of breath, leg  swelling &/or pain, confusion, dizziness, etc    Isolate per CDC guidelines for COVID illness unless further medical care if needed. Isolation guidelines as of Oct 2023 are:  5 days isolation at home then you can return to activities on day 6-10 (masked) if improving symptoms and fever-free for 24 hours.     Quarantine of people you've been in contact with is no longer recommended regardless of their vaccination status.      Lagevrio fact sheet https://www.fda.gov/media/211997/download

## 2023-12-05 ENCOUNTER — TELEPHONE (OUTPATIENT)
Dept: PRIMARY CARE | Facility: CLINIC | Age: 73
End: 2023-12-05
Payer: MEDICARE

## 2023-12-05 NOTE — TELEPHONE ENCOUNTER
Pt advised to go to ED 12/5/23 LD  9:42am    Covid positive, he is short of breath with small periods of blackouts, has been rationing medication to make it last longer. Having fatigue and malaise. Ok to squeeze in for a telephone call?

## 2023-12-26 ENCOUNTER — OFFICE VISIT (OUTPATIENT)
Dept: PRIMARY CARE | Facility: CLINIC | Age: 73
End: 2023-12-26
Payer: MEDICARE

## 2023-12-26 VITALS
HEART RATE: 77 BPM | SYSTOLIC BLOOD PRESSURE: 119 MMHG | DIASTOLIC BLOOD PRESSURE: 73 MMHG | HEIGHT: 71 IN | BODY MASS INDEX: 38.64 KG/M2 | WEIGHT: 276 LBS | OXYGEN SATURATION: 92 %

## 2023-12-26 DIAGNOSIS — E55.9 VITAMIN D DEFICIENCY: ICD-10-CM

## 2023-12-26 DIAGNOSIS — N40.0 BENIGN PROSTATIC HYPERPLASIA, UNSPECIFIED WHETHER LOWER URINARY TRACT SYMPTOMS PRESENT: ICD-10-CM

## 2023-12-26 DIAGNOSIS — R53.83 FATIGUE, UNSPECIFIED TYPE: ICD-10-CM

## 2023-12-26 DIAGNOSIS — I10 BENIGN ESSENTIAL HYPERTENSION: ICD-10-CM

## 2023-12-26 DIAGNOSIS — Z13.9 DUE FOR SCREENING: ICD-10-CM

## 2023-12-26 DIAGNOSIS — E11.9 TYPE 2 DIABETES MELLITUS WITHOUT COMPLICATION, WITHOUT LONG-TERM CURRENT USE OF INSULIN (MULTI): Primary | ICD-10-CM

## 2023-12-26 DIAGNOSIS — E78.5 DYSLIPIDEMIA: ICD-10-CM

## 2023-12-26 DIAGNOSIS — Z11.59 NEED FOR HEPATITIS C SCREENING TEST: ICD-10-CM

## 2023-12-26 DIAGNOSIS — N39.490 OVERFLOW INCONTINENCE OF URINE: ICD-10-CM

## 2023-12-26 DIAGNOSIS — G47.9 SLEEP DISORDER, UNSPECIFIED: ICD-10-CM

## 2023-12-26 PROBLEM — R63.2 POLYPHAGIA: Status: ACTIVE | Noted: 2023-12-26

## 2023-12-26 PROBLEM — R09.81 SINUS CONGESTION: Status: ACTIVE | Noted: 2023-12-26

## 2023-12-26 PROBLEM — M79.672 BILATERAL LEG AND FOOT PAIN: Status: ACTIVE | Noted: 2023-12-26

## 2023-12-26 PROBLEM — R94.31 ABNORMAL EKG: Status: ACTIVE | Noted: 2023-12-26

## 2023-12-26 PROBLEM — M79.18 MYOFASCIAL PAIN: Status: ACTIVE | Noted: 2023-12-26

## 2023-12-26 PROBLEM — E78.00 PURE HYPERCHOLESTEROLEMIA: Status: ACTIVE | Noted: 2023-12-26

## 2023-12-26 PROBLEM — M79.671 BILATERAL LEG AND FOOT PAIN: Status: ACTIVE | Noted: 2023-12-26

## 2023-12-26 PROBLEM — R73.03 PREDIABETES: Status: ACTIVE | Noted: 2023-12-26

## 2023-12-26 PROBLEM — R09.81 NASAL CONGESTION: Status: ACTIVE | Noted: 2023-12-26

## 2023-12-26 PROBLEM — M79.605 BILATERAL LEG AND FOOT PAIN: Status: ACTIVE | Noted: 2023-12-26

## 2023-12-26 PROBLEM — M25.512 LEFT SHOULDER PAIN: Status: ACTIVE | Noted: 2023-12-26

## 2023-12-26 PROBLEM — F32.A DEPRESSION: Status: ACTIVE | Noted: 2023-12-26

## 2023-12-26 PROBLEM — J01.90 ACUTE SINUSITIS, UNSPECIFIED: Status: RESOLVED | Noted: 2023-12-26 | Resolved: 2023-12-26

## 2023-12-26 PROBLEM — M47.816 LUMBAR SPONDYLOSIS: Status: ACTIVE | Noted: 2023-12-26

## 2023-12-26 PROBLEM — R05.9 COUGH: Status: RESOLVED | Noted: 2023-12-26 | Resolved: 2023-12-26

## 2023-12-26 PROBLEM — M79.604 BILATERAL LEG AND FOOT PAIN: Status: ACTIVE | Noted: 2023-12-26

## 2023-12-26 PROBLEM — R73.09 ELEVATED GLUCOSE: Status: ACTIVE | Noted: 2023-12-26

## 2023-12-26 PROCEDURE — 3008F BODY MASS INDEX DOCD: CPT

## 2023-12-26 PROCEDURE — 1159F MED LIST DOCD IN RCRD: CPT

## 2023-12-26 PROCEDURE — 3078F DIAST BP <80 MM HG: CPT

## 2023-12-26 PROCEDURE — 1126F AMNT PAIN NOTED NONE PRSNT: CPT

## 2023-12-26 PROCEDURE — 1160F RVW MEDS BY RX/DR IN RCRD: CPT

## 2023-12-26 PROCEDURE — 1036F TOBACCO NON-USER: CPT

## 2023-12-26 PROCEDURE — 3074F SYST BP LT 130 MM HG: CPT

## 2023-12-26 PROCEDURE — 99214 OFFICE O/P EST MOD 30 MIN: CPT

## 2023-12-26 RX ORDER — OXYBUTYNIN CHLORIDE 5 MG/1
5 TABLET ORAL 2 TIMES DAILY
Qty: 180 TABLET | Refills: 1 | Status: SHIPPED | OUTPATIENT
Start: 2023-12-26 | End: 2024-06-23

## 2023-12-26 RX ORDER — TADALAFIL 5 MG/1
5 TABLET ORAL DAILY PRN
COMMUNITY
Start: 2019-10-18 | End: 2024-02-06 | Stop reason: ALTCHOICE

## 2023-12-26 RX ORDER — ALFUZOSIN HYDROCHLORIDE 10 MG/1
10 TABLET, EXTENDED RELEASE ORAL DAILY
Qty: 90 TABLET | Refills: 1 | Status: SHIPPED | OUTPATIENT
Start: 2023-12-26 | End: 2024-06-23

## 2023-12-26 RX ORDER — TRAZODONE HYDROCHLORIDE 100 MG/1
100 TABLET ORAL NIGHTLY
Qty: 90 TABLET | Refills: 1 | Status: SHIPPED | OUTPATIENT
Start: 2023-12-26 | End: 2024-02-06 | Stop reason: SDUPTHER

## 2023-12-26 ASSESSMENT — ENCOUNTER SYMPTOMS
ALLERGIC/IMMUNOLOGIC NEGATIVE: 1
MYALGIAS: 1
PSYCHIATRIC NEGATIVE: 1
NAUSEA: 0
SHORTNESS OF BREATH: 1
CONSTIPATION: 0
CONSTITUTIONAL NEGATIVE: 1
RHINORRHEA: 0
ARTHRALGIAS: 1
DIARRHEA: 0
VOMITING: 0
HEMATOLOGIC/LYMPHATIC NEGATIVE: 1
WHEEZING: 0
PALPITATIONS: 0
NEUROLOGICAL NEGATIVE: 1
BACK PAIN: 1
COUGH: 0
BLOOD IN STOOL: 0
CARDIOVASCULAR NEGATIVE: 1

## 2023-12-26 ASSESSMENT — PATIENT HEALTH QUESTIONNAIRE - PHQ9
2. FEELING DOWN, DEPRESSED OR HOPELESS: NOT AT ALL
1. LITTLE INTEREST OR PLEASURE IN DOING THINGS: NOT AT ALL
SUM OF ALL RESPONSES TO PHQ9 QUESTIONS 1 AND 2: 0

## 2023-12-26 NOTE — PATIENT INSTRUCTIONS
It was great to see you today!  Please continue taking your prescribed medications.   Refill have been sent to your pharmacy.    I have ordered some labs to be done as soon as you can.  We will call you with the results.    Please follow up in 6 weeks  --------------------   Yadi Dow, APRN-CNP,      Ways to Help Prevent Falls at Home    Quick Tips   ? Ask for help if you need it. Most people want to help!   ? Get up slowly after sitting or laying down   ? Wear a medical alert device or keep cell phone in your pocket   ? Use night lights, especially areas near a bathroom   ? Keep the items you use often within reach on a small stool or end table   ? Use an assistive device such as walker or cane, as directed by provider/physical therapy   ? Use a non-slip mat and grab bars in your bathroom. Look for home health sections for best options     Other Areas to Focus On   ? Exercise and nutrition: Regular exercise or taking a falls prevention class are great ways improve strength and balance. Don’t forget to stay hydrated and bring a snack!   ? Medicine side effects: Some medicines can make you sleepy or dizzy, which could cause a fall. Ask your healthcare provider about the side effects your medicines could cause. Be sure to let them know if you take any vitamins or supplements as well.   ? Tripping hazards: Remove items you could trip on, such as loose mats, rugs, cords, and clutter. Wear closed toe shoes with rubber soles.   ? Health and wellness: Get regular checkups with your healthcare provider, plus routine vision and hearing screenings. Talk with your healthcare provider about:   o Your medicines and the possible side effects - bring them in a bag if that is easier!   o Problems with balance or feeling dizzy   o Ways to promote bone health, such as Vitamin D and calcium supplements   o Questions or concerns about falling     *Ask your healthcare team if you have questions     ©[facility], 2022

## 2023-12-26 NOTE — PROGRESS NOTES
"Patient ID:   Vincent Ramirez \"Car\" is a 73 y.o. male with PMH remarkable for obesity, insomnia, BPH who presents to the office today for Establish Care.    Car is here as a new patient to establish care with this provider. He has no complaints at this time.  He has been struggling to control his weight, and has been attempting to increase his activities.  He recently committed to 3 months of daily workouts at [employer] but was discouraged by lack of results. He enjoys walking outdoors and plans of using the golf course near [place] to walk.  He does report SOB with activity such as walking>1 mile.  She has been following with pain management for back and shoulder pain from years of playing football. He is a retired .         HEALTH MAINTENANCE: Establish Care  Previous PCP: Maurizio  Smoking: no  Labs: ordered  PSA: ordered  Colonoscopy (45-75): Cologaurd 7/22 negative    SOCIAL HISTORY:  Social History     Tobacco Use    Smoking status: Never    Smokeless tobacco: Never   Substance Use Topics    Alcohol use: Yes     Comment: sometimes    Drug use: Never       IMMUNIZATIONS:  Immunization History   Administered Date(s) Administered    Flu vaccine (IIV4), preservative free *Check age/dose* 10/03/2018, 10/07/2019, 10/29/2020    Flu vaccine, quadrivalent, high-dose, preservative free, age 65y+ (FLUZONE) 10/12/2022, 10/18/2023    Influenza, High Dose Seasonal, Preservative Free 09/29/2021    Influenza, Unspecified 10/27/2000, 11/16/2011, 11/01/2012, 12/03/2013, 10/14/2014, 09/13/2017    Influenza, seasonal, injectable 10/14/2016    Influenza, seasonal, injectable, preservative free 09/01/2015    Maik SARS-CoV-2 Vaccination 03/11/2021    Pneumococcal Conjugate PCV 7 03/10/2012    Pneumococcal conjugate vaccine, 13-valent (PREVNAR 13) 01/03/2020    Pneumococcal polysaccharide vaccine, 23-valent, age 2 years and older (PNEUMOVAX 23) 04/23/2012, 12/23/2015, 02/21/2023    Tdap vaccine, age 7 year " and older (BOOSTRIX) 10/10/2008    Zoster, live 11/19/2014       REVIEW OF SYSTEMS:  Review of Systems   Constitutional: Negative.    HENT: Negative.  Negative for congestion and rhinorrhea.    Respiratory:  Positive for shortness of breath (with activity >1 mile). Negative for cough and wheezing.    Cardiovascular: Negative.  Negative for chest pain, palpitations and leg swelling.   Gastrointestinal:  Negative for blood in stool, constipation, diarrhea, nausea and vomiting.   Genitourinary: Negative.    Musculoskeletal:  Positive for arthralgias, back pain and myalgias.   Skin: Negative.    Allergic/Immunologic: Negative.    Neurological: Negative.    Hematological: Negative.    Psychiatric/Behavioral: Negative.         ALLERGIES:  Allergies   Allergen Reactions    Penicillins Other and Hives        VITAL SIGNS:  Vitals:    12/26/23 0823   BP: 119/73   Pulse: 77   SpO2: 92%       Physical Exam  Constitutional:       General: He is not in acute distress.     Appearance: Normal appearance. He is obese.   HENT:      Head: Normocephalic.      Nose: Nose normal.      Mouth/Throat:      Mouth: Mucous membranes are moist.      Pharynx: Oropharynx is clear.   Eyes:      Extraocular Movements: Extraocular movements intact.      Conjunctiva/sclera: Conjunctivae normal.      Pupils: Pupils are equal, round, and reactive to light.   Cardiovascular:      Rate and Rhythm: Normal rate and regular rhythm.      Pulses: Normal pulses.      Heart sounds: Normal heart sounds. No murmur heard.     No gallop.   Pulmonary:      Effort: Pulmonary effort is normal. No respiratory distress.      Breath sounds: Normal breath sounds. No wheezing, rhonchi or rales.   Abdominal:      General: Abdomen is flat. Bowel sounds are normal.      Palpations: Abdomen is soft.      Tenderness: There is no abdominal tenderness.   Musculoskeletal:         General: Normal range of motion.      Cervical back: Normal range of motion and neck supple.       Right lower leg: No edema.      Left lower leg: No edema.   Skin:     General: Skin is warm and dry.      Capillary Refill: Capillary refill takes less than 2 seconds.   Neurological:      General: No focal deficit present.      Mental Status: He is alert. Mental status is at baseline.   Psychiatric:         Mood and Affect: Mood normal.         Behavior: Behavior normal.         Thought Content: Thought content normal.         Judgment: Judgment normal.         MEDICATIONS:  Current Outpatient Medications on File Prior to Visit   Medication Sig Dispense Refill    aspirin 81 mg EC tablet Take 1 tablet (81 mg) by mouth once daily.      ergocalciferol (Vitamin D-2) 1.25 MG (56621 UT) capsule Take 1 capsule (50,000 Units) by mouth 1 (one) time per week. 30 capsule 1    gabapentin (Neurontin) 100 mg capsule Take 1 capsule (100 mg) by mouth once daily at bedtime.      tadalafil (Cialis) 5 mg tablet Take 1 tablet (5 mg) by mouth once daily as needed.      [DISCONTINUED] alfuzosin (Uroxatral) 10 mg 24 hr tablet Take 1 tablet (10 mg) by mouth once daily. 90 tablet 1    [DISCONTINUED] oxybutynin (Ditropan) 5 mg tablet Take 1 tablet (5 mg) by mouth 2 times a day. 60 tablet 5    [DISCONTINUED] traZODone (Desyrel) 100 mg tablet Take 1 tablet (100 mg) by mouth once daily at bedtime. 90 tablet 1    [DISCONTINUED] atorvastatin (Lipitor) 40 mg tablet Take 1 tablet (40 mg) by mouth once daily.      [DISCONTINUED] finasteride (Proscar) 5 mg tablet Take 1 tablet (5 mg) by mouth once daily. 90 tablet 1    [DISCONTINUED] semaglutide 0.25 mg or 0.5 mg (2 mg/3 mL) pen injector Inject 0.25 mg under the skin 1 (one) time per week for 30 days, THEN 0.5 mg 1 (one) time per week. 6 mL 0    [DISCONTINUED] sertraline (Zoloft) 50 mg tablet Take 1 tablet (50 mg) by mouth once daily. 90 tablet 1     No current facility-administered medications on file prior to visit.        LABORATORY DATA:  Lab Results   Component Value Date    WBC 9.9 02/20/2023     HGB 15.1 02/20/2023    HCT 46.1 02/20/2023     02/20/2023    CHOL 120 02/20/2023    TRIG 148 02/20/2023    HDL 38.8 (A) 02/20/2023    ALT 38 02/20/2023    AST 19 02/20/2023     02/20/2023    K 4.1 02/20/2023     02/20/2023    CREATININE 1.06 02/20/2023    BUN 11 02/20/2023    CO2 27 02/20/2023    TSH 1.68 08/25/2021    PSA 1.4 07/28/2020    INR 1.1 08/25/2021    HGBA1C 6.0 10/18/2023       ASSESSMENT AND PLAN:  Assessment/Plan   Diagnoses and all orders for this visit:  Type 2 diabetes mellitus without complication, without long-term current use of insulin (CMS/[facility])  -     semaglutide 0.25 mg or 0.5 mg (2 mg/3 mL) pen injector; Inject 0.25 mg under the skin 1 (one) time per week for 30 days, THEN 0.5 mg 1 (one) time per week.  -     Hemoglobin A1c; Future  -     Urinalysis with Reflex Microscopic; Future  Need for hepatitis C screening test  -     Hepatitis panel, acute; Future  Overflow incontinence of urine  -     oxybutynin (Ditropan) 5 mg tablet; Take 1 tablet (5 mg) by mouth 2 times a day.  Benign prostatic hyperplasia, unspecified whether lower urinary tract symptoms present  -     alfuzosin (Uroxatral) 10 mg 24 hr tablet; Take 1 tablet (10 mg) by mouth once daily.  -     PSA, total and free; Future  Sleep disorder, unspecified  -     traZODone (Desyrel) 100 mg tablet; Take 1 tablet (100 mg) by mouth once daily at bedtime.  Dyslipidemia  -     Lipid panel; Future  Due for screening  -     Iron and TIBC; Future  -     Ferritin; Future  -     Tsh With Reflex To Free T4 If Abnormal; Future  Vitamin D deficiency  -     Vitamin D 25-Hydroxy,Total (for eval of Vitamin D levels); Future  Fatigue, unspecified type  -     Vitamin B12; Future  Benign essential hypertension  -     Comprehensive metabolic panel; Future      It was great to see you today!  Please continue taking your prescribed medications.   Refill have been sent to your pharmacy.    I have ordered some labs to be done as soon as you  can.  We will call you with the results.    Please follow up in 6 weeks  --------------------   BRITTANY Boothe-JOSE MANUEL,

## 2024-01-03 ENCOUNTER — LAB (OUTPATIENT)
Dept: LAB | Facility: LAB | Age: 74
End: 2024-01-03
Payer: MEDICARE

## 2024-01-03 DIAGNOSIS — E78.5 DYSLIPIDEMIA: ICD-10-CM

## 2024-01-03 DIAGNOSIS — R53.83 FATIGUE, UNSPECIFIED TYPE: ICD-10-CM

## 2024-01-03 DIAGNOSIS — Z13.9 DUE FOR SCREENING: ICD-10-CM

## 2024-01-03 DIAGNOSIS — Z11.59 NEED FOR HEPATITIS C SCREENING TEST: ICD-10-CM

## 2024-01-03 DIAGNOSIS — I10 BENIGN ESSENTIAL HYPERTENSION: ICD-10-CM

## 2024-01-03 DIAGNOSIS — E55.9 VITAMIN D DEFICIENCY: ICD-10-CM

## 2024-01-03 DIAGNOSIS — E11.9 TYPE 2 DIABETES MELLITUS WITHOUT COMPLICATION, WITHOUT LONG-TERM CURRENT USE OF INSULIN (MULTI): ICD-10-CM

## 2024-01-03 DIAGNOSIS — N40.0 BENIGN PROSTATIC HYPERPLASIA, UNSPECIFIED WHETHER LOWER URINARY TRACT SYMPTOMS PRESENT: ICD-10-CM

## 2024-01-03 LAB
ALBUMIN SERPL BCP-MCNC: 4.6 G/DL (ref 3.4–5)
ALP SERPL-CCNC: 60 U/L (ref 33–136)
ALT SERPL W P-5'-P-CCNC: 32 U/L (ref 10–52)
ANION GAP SERPL CALC-SCNC: 12 MMOL/L (ref 10–20)
AST SERPL W P-5'-P-CCNC: 20 U/L (ref 9–39)
BILIRUB SERPL-MCNC: 1.4 MG/DL (ref 0–1.2)
BUN SERPL-MCNC: 15 MG/DL (ref 6–23)
CALCIUM SERPL-MCNC: 9.7 MG/DL (ref 8.6–10.3)
CHLORIDE SERPL-SCNC: 105 MMOL/L (ref 98–107)
CHOLEST SERPL-MCNC: 175 MG/DL (ref 0–199)
CHOLESTEROL/HDL RATIO: 4.1
CO2 SERPL-SCNC: 28 MMOL/L (ref 21–32)
CREAT SERPL-MCNC: 0.89 MG/DL (ref 0.5–1.3)
FERRITIN SERPL-MCNC: 113 NG/ML (ref 20–300)
GFR SERPL CREATININE-BSD FRML MDRD: 90 ML/MIN/1.73M*2
GLUCOSE SERPL-MCNC: 104 MG/DL (ref 74–99)
HDLC SERPL-MCNC: 43.1 MG/DL
IRON SATN MFR SERPL: 25 % (ref 25–45)
IRON SERPL-MCNC: 102 UG/DL (ref 35–150)
LDLC SERPL CALC-MCNC: 93 MG/DL
NON HDL CHOLESTEROL: 132 MG/DL (ref 0–149)
POTASSIUM SERPL-SCNC: 4.7 MMOL/L (ref 3.5–5.3)
PROT SERPL-MCNC: 7.2 G/DL (ref 6.4–8.2)
SODIUM SERPL-SCNC: 140 MMOL/L (ref 136–145)
TIBC SERPL-MCNC: 405 UG/DL (ref 240–445)
TRIGL SERPL-MCNC: 193 MG/DL (ref 0–149)
TSH SERPL-ACNC: 1.58 MIU/L (ref 0.44–3.98)
UIBC SERPL-MCNC: 303 UG/DL (ref 110–370)
VLDL: 39 MG/DL (ref 0–40)

## 2024-01-03 PROCEDURE — 80053 COMPREHEN METABOLIC PANEL: CPT

## 2024-01-03 PROCEDURE — 84153 ASSAY OF PSA TOTAL: CPT

## 2024-01-03 PROCEDURE — 81003 URINALYSIS AUTO W/O SCOPE: CPT

## 2024-01-03 PROCEDURE — 80061 LIPID PANEL: CPT

## 2024-01-03 PROCEDURE — 82607 VITAMIN B-12: CPT

## 2024-01-03 PROCEDURE — 83540 ASSAY OF IRON: CPT

## 2024-01-03 PROCEDURE — 80074 ACUTE HEPATITIS PANEL: CPT

## 2024-01-03 PROCEDURE — 83036 HEMOGLOBIN GLYCOSYLATED A1C: CPT

## 2024-01-03 PROCEDURE — 82728 ASSAY OF FERRITIN: CPT

## 2024-01-03 PROCEDURE — 36415 COLL VENOUS BLD VENIPUNCTURE: CPT

## 2024-01-03 PROCEDURE — 83550 IRON BINDING TEST: CPT

## 2024-01-03 PROCEDURE — 84443 ASSAY THYROID STIM HORMONE: CPT

## 2024-01-03 PROCEDURE — 82306 VITAMIN D 25 HYDROXY: CPT

## 2024-01-03 PROCEDURE — 84154 ASSAY OF PSA FREE: CPT

## 2024-01-04 LAB
25(OH)D3 SERPL-MCNC: 60 NG/ML (ref 30–100)
APPEARANCE UR: CLEAR
BILIRUB UR STRIP.AUTO-MCNC: NEGATIVE MG/DL
COLOR UR: YELLOW
EST. AVERAGE GLUCOSE BLD GHB EST-MCNC: 117 MG/DL
GLUCOSE UR STRIP.AUTO-MCNC: NEGATIVE MG/DL
HAV IGM SER QL: NONREACTIVE
HBA1C MFR BLD: 5.7 %
HBV CORE IGM SER QL: NONREACTIVE
HBV SURFACE AG SERPL QL IA: NONREACTIVE
HCV AB SER QL: NONREACTIVE
KETONES UR STRIP.AUTO-MCNC: NEGATIVE MG/DL
LEUKOCYTE ESTERASE UR QL STRIP.AUTO: NEGATIVE
NITRITE UR QL STRIP.AUTO: NEGATIVE
PH UR STRIP.AUTO: 5 [PH]
PROT UR STRIP.AUTO-MCNC: NEGATIVE MG/DL
RBC # UR STRIP.AUTO: NEGATIVE /UL
SP GR UR STRIP.AUTO: 1.02
UROBILINOGEN UR STRIP.AUTO-MCNC: <2 MG/DL
VIT B12 SERPL-MCNC: 439 PG/ML (ref 211–911)

## 2024-01-05 DIAGNOSIS — R97.20 ELEVATED PSA, LESS THAN 10 NG/ML: Primary | ICD-10-CM

## 2024-01-05 LAB
PSA FREE MFR SERPL: 11 %
PSA FREE SERPL-MCNC: 0.8 NG/ML
PSA SERPL IA-MCNC: 7.2 NG/ML (ref 0–4)

## 2024-01-16 DIAGNOSIS — F32.1 CURRENT MODERATE EPISODE OF MAJOR DEPRESSIVE DISORDER WITHOUT PRIOR EPISODE (MULTI): ICD-10-CM

## 2024-01-16 RX ORDER — SERTRALINE HYDROCHLORIDE 50 MG/1
50 TABLET, FILM COATED ORAL DAILY
Qty: 90 TABLET | Refills: 1 | Status: SHIPPED | OUTPATIENT
Start: 2024-01-16

## 2024-01-16 RX ORDER — SERTRALINE HYDROCHLORIDE 50 MG/1
50 TABLET, FILM COATED ORAL DAILY
COMMUNITY
End: 2024-01-16 | Stop reason: SDUPTHER

## 2024-01-18 ENCOUNTER — APPOINTMENT (OUTPATIENT)
Dept: PRIMARY CARE | Facility: CLINIC | Age: 74
End: 2024-01-18
Payer: MEDICARE

## 2024-01-23 ENCOUNTER — LAB (OUTPATIENT)
Dept: LAB | Facility: LAB | Age: 74
End: 2024-01-23
Payer: MEDICARE

## 2024-01-23 DIAGNOSIS — R97.20 ELEVATED PROSTATE SPECIFIC ANTIGEN (PSA): Primary | ICD-10-CM

## 2024-01-23 PROCEDURE — 84154 ASSAY OF PSA FREE: CPT

## 2024-01-23 PROCEDURE — 36415 COLL VENOUS BLD VENIPUNCTURE: CPT

## 2024-01-23 PROCEDURE — 84153 ASSAY OF PSA TOTAL: CPT

## 2024-01-25 LAB
PSA FREE MFR SERPL: 10 %
PSA FREE SERPL-MCNC: 0.8 NG/ML
PSA SERPL IA-MCNC: 7.9 NG/ML (ref 0–4)

## 2024-02-01 PROBLEM — M51.26 HERNIATED LUMBAR INTERVERTEBRAL DISC: Status: ACTIVE | Noted: 2022-10-10

## 2024-02-01 PROBLEM — R42 DIZZINESS AND GIDDINESS: Status: ACTIVE | Noted: 2024-02-01

## 2024-02-06 ENCOUNTER — OFFICE VISIT (OUTPATIENT)
Dept: PRIMARY CARE | Facility: CLINIC | Age: 74
End: 2024-02-06
Payer: MEDICARE

## 2024-02-06 VITALS
SYSTOLIC BLOOD PRESSURE: 120 MMHG | RESPIRATION RATE: 16 BRPM | BODY MASS INDEX: 37.56 KG/M2 | OXYGEN SATURATION: 94 % | HEIGHT: 71 IN | WEIGHT: 268.3 LBS | DIASTOLIC BLOOD PRESSURE: 69 MMHG | HEART RATE: 83 BPM

## 2024-02-06 DIAGNOSIS — E11.9 TYPE 2 DIABETES MELLITUS WITHOUT COMPLICATION, WITHOUT LONG-TERM CURRENT USE OF INSULIN (MULTI): ICD-10-CM

## 2024-02-06 DIAGNOSIS — G47.9 SLEEP DISORDER, UNSPECIFIED: ICD-10-CM

## 2024-02-06 DIAGNOSIS — E11.9 TYPE 2 DIABETES MELLITUS WITHOUT COMPLICATION, WITHOUT LONG-TERM CURRENT USE OF INSULIN (MULTI): Primary | ICD-10-CM

## 2024-02-06 DIAGNOSIS — E55.9 VITAMIN D DEFICIENCY: ICD-10-CM

## 2024-02-06 PROCEDURE — 99213 OFFICE O/P EST LOW 20 MIN: CPT

## 2024-02-06 PROCEDURE — 3008F BODY MASS INDEX DOCD: CPT

## 2024-02-06 PROCEDURE — 3044F HG A1C LEVEL LT 7.0%: CPT

## 2024-02-06 PROCEDURE — 3078F DIAST BP <80 MM HG: CPT

## 2024-02-06 PROCEDURE — 1159F MED LIST DOCD IN RCRD: CPT

## 2024-02-06 PROCEDURE — 3048F LDL-C <100 MG/DL: CPT

## 2024-02-06 PROCEDURE — 1126F AMNT PAIN NOTED NONE PRSNT: CPT

## 2024-02-06 PROCEDURE — 1160F RVW MEDS BY RX/DR IN RCRD: CPT

## 2024-02-06 PROCEDURE — 1157F ADVNC CARE PLAN IN RCRD: CPT

## 2024-02-06 PROCEDURE — 3074F SYST BP LT 130 MM HG: CPT

## 2024-02-06 PROCEDURE — 1036F TOBACCO NON-USER: CPT

## 2024-02-06 RX ORDER — TRAZODONE HYDROCHLORIDE 100 MG/1
100 TABLET ORAL NIGHTLY
Qty: 90 TABLET | Refills: 1 | Status: SHIPPED | OUTPATIENT
Start: 2024-02-06 | End: 2024-08-04

## 2024-02-06 RX ORDER — ERGOCALCIFEROL 1.25 MG/1
50000 CAPSULE ORAL
Qty: 30 CAPSULE | Refills: 1 | Status: SHIPPED | OUTPATIENT
Start: 2024-02-06

## 2024-02-06 ASSESSMENT — ENCOUNTER SYMPTOMS
PSYCHIATRIC NEGATIVE: 1
GASTROINTESTINAL NEGATIVE: 1
ALLERGIC/IMMUNOLOGIC NEGATIVE: 1
CONSTIPATION: 0
WHEEZING: 0
PALPITATIONS: 0
WEAKNESS: 0
CONSTITUTIONAL NEGATIVE: 1
COUGH: 0
CARDIOVASCULAR NEGATIVE: 1
TREMORS: 0
VOMITING: 0
BACK PAIN: 1
RHINORRHEA: 0
DYSURIA: 0
FATIGUE: 0
SHORTNESS OF BREATH: 1
DIARRHEA: 0
FEVER: 0
NEUROLOGICAL NEGATIVE: 1
DIZZINESS: 0
HEMATOLOGIC/LYMPHATIC NEGATIVE: 1

## 2024-02-06 ASSESSMENT — COLUMBIA-SUICIDE SEVERITY RATING SCALE - C-SSRS
6. HAVE YOU EVER DONE ANYTHING, STARTED TO DO ANYTHING, OR PREPARED TO DO ANYTHING TO END YOUR LIFE?: NO
1. IN THE PAST MONTH, HAVE YOU WISHED YOU WERE DEAD OR WISHED YOU COULD GO TO SLEEP AND NOT WAKE UP?: NO
2. HAVE YOU ACTUALLY HAD ANY THOUGHTS OF KILLING YOURSELF?: NO

## 2024-02-06 ASSESSMENT — PAIN SCALES - GENERAL: PAINLEVEL: 0-NO PAIN

## 2024-02-06 NOTE — PROGRESS NOTES
"Subjective   Patient ID: Vincent Ramirez \"Car\" is a 73 y.o. male who presents for Diabetes and medication follow up. Patient in today for a follow up on new medication. Patient states that he started Ozempic and is doing okay with it. Patient states that he takes it on Sunday and he feels a little sluggish until Monday but then it goes away. Patient denies any other reaction to the medication. Patient states that his shortness of breath is improving as well, states he can make up the stairs without feeling winded. Patient denies any other complaints or concerns at this time.   Today he is accompanied by alone.     Vincent is being seen today for follow-up on his diabetes.  6 weeks ago we started him on Ozempic, and he reports no side effects.  He states that the first day he takes it he feels a little punky but it resolves.  So we will continue 0.5 mg x 1 month, then increase to 1 mg in one month.  He has lost 8 pounds since his last appointment.  He states that he is feeling better.  No other complaints today.        Review of Systems   Constitutional: Negative.  Negative for fatigue and fever.   HENT: Negative.  Negative for congestion and rhinorrhea.    Respiratory:  Positive for shortness of breath (with activity). Negative for cough and wheezing.    Cardiovascular: Negative.  Negative for chest pain, palpitations and leg swelling.   Gastrointestinal: Negative.  Negative for constipation, diarrhea and vomiting.   Genitourinary: Negative.  Negative for dysuria and urgency.   Musculoskeletal:  Positive for back pain.   Skin: Negative.    Allergic/Immunologic: Negative.    Neurological: Negative.  Negative for dizziness, tremors and weakness.   Hematological: Negative.    Psychiatric/Behavioral: Negative.         Objective   /69 (BP Location: Right arm)   Pulse 83   Resp 16   Ht 1.803 m (5' 11\")   Wt 122 kg (268 lb 4.8 oz)   SpO2 94%   BMI 37.42 kg/m²   BSA: 2.47 meters squared  Growth percentiles: " "Facility age limit for growth %adry is 20 years. Facility age limit for growth %adry is 20 years.     Physical Exam  Vitals and nursing note reviewed.   Constitutional:       Appearance: Normal appearance. He is normal weight.   HENT:      Head: Normocephalic.      Nose: Nose normal.      Mouth/Throat:      Mouth: Mucous membranes are moist.      Pharynx: Oropharynx is clear.   Eyes:      Extraocular Movements: Extraocular movements intact.      Conjunctiva/sclera: Conjunctivae normal.      Pupils: Pupils are equal, round, and reactive to light.   Cardiovascular:      Rate and Rhythm: Normal rate and regular rhythm.      Pulses: Normal pulses.      Heart sounds: Normal heart sounds.   Pulmonary:      Effort: Pulmonary effort is normal.      Breath sounds: Normal breath sounds.   Abdominal:      General: Abdomen is flat. Bowel sounds are normal.      Palpations: Abdomen is soft.   Musculoskeletal:         General: Normal range of motion.      Cervical back: Normal range of motion and neck supple.   Skin:     General: Skin is warm and dry.      Capillary Refill: Capillary refill takes less than 2 seconds.   Neurological:      General: No focal deficit present.      Mental Status: He is alert. Mental status is at baseline.   Psychiatric:         Mood and Affect: Mood normal.         Behavior: Behavior normal.         Thought Content: Thought content normal.         Judgment: Judgment normal.       /69 (BP Location: Right arm)   Pulse 83   Resp 16   Ht 1.803 m (5' 11\")   Wt 122 kg (268 lb 4.8 oz)   SpO2 94%   BMI 37.42 kg/m²    Lab Results   Component Value Date    GLUCOSE 104 (H) 01/03/2024    CALCIUM 9.7 01/03/2024     01/03/2024    K 4.7 01/03/2024    CO2 28 01/03/2024     01/03/2024    BUN 15 01/03/2024    CREATININE 0.89 01/03/2024        Assessment/Plan   Problem List Items Addressed This Visit       Vitamin D deficiency    Relevant Medications    ergocalciferol (Vitamin D-2) 1.25 MG (79447 " UT) capsule     Other Visit Diagnoses       Type 2 diabetes mellitus without complication, without long-term current use of insulin (CMS/[facility])    -  Primary    Relevant Medications    semaglutide (OZEMPIC) 1 mg/dose (4 mg/3 mL) pen injector    Sleep disorder, unspecified        Relevant Medications    traZODone (Desyrel) 100 mg tablet          It was great to see you today!  Please continue taking your prescribed medications.   Refill have been sent to your pharmacy.    Please increase ozempic to 1 mg  Follow up in 2 months

## 2024-02-08 ENCOUNTER — APPOINTMENT (OUTPATIENT)
Dept: PRIMARY CARE | Facility: CLINIC | Age: 74
End: 2024-02-08
Payer: MEDICARE

## 2024-02-26 ENCOUNTER — ESTABLISHED PATIENT (OUTPATIENT)
Dept: URBAN - METROPOLITAN AREA CLINIC 10 | Facility: CLINIC | Age: 74
End: 2024-02-26

## 2024-02-26 DIAGNOSIS — Z96.1: ICD-10-CM

## 2024-02-26 DIAGNOSIS — H43.22: ICD-10-CM

## 2024-02-26 DIAGNOSIS — E11.9: ICD-10-CM

## 2024-02-26 DIAGNOSIS — S05.31XS: ICD-10-CM

## 2024-02-26 PROCEDURE — 92014 COMPRE OPH EXAM EST PT 1/>: CPT

## 2024-02-26 ASSESSMENT — VISUAL ACUITY
OS_CC: 20/25+2
OU_CC: 20/20
OD_CC: 20/80

## 2024-02-26 ASSESSMENT — TONOMETRY
OS_IOP_MMHG: 16
OD_IOP_MMHG: 20

## 2024-03-11 ENCOUNTER — APPOINTMENT (OUTPATIENT)
Dept: UROLOGY | Facility: CLINIC | Age: 74
End: 2024-03-11
Payer: MEDICARE

## 2024-03-18 DIAGNOSIS — E11.9 TYPE 2 DIABETES MELLITUS WITHOUT COMPLICATION, WITHOUT LONG-TERM CURRENT USE OF INSULIN (MULTI): ICD-10-CM

## 2024-04-05 ENCOUNTER — APPOINTMENT (OUTPATIENT)
Dept: PRIMARY CARE | Facility: CLINIC | Age: 74
End: 2024-04-05
Payer: MEDICARE

## 2024-04-18 ENCOUNTER — OFFICE VISIT (OUTPATIENT)
Dept: PRIMARY CARE | Facility: CLINIC | Age: 74
End: 2024-04-18
Payer: MEDICARE

## 2024-04-18 VITALS
BODY MASS INDEX: 36.89 KG/M2 | HEIGHT: 71 IN | HEART RATE: 76 BPM | WEIGHT: 263.5 LBS | DIASTOLIC BLOOD PRESSURE: 74 MMHG | SYSTOLIC BLOOD PRESSURE: 118 MMHG | OXYGEN SATURATION: 93 %

## 2024-04-18 DIAGNOSIS — C61 PROSTATE CANCER (MULTI): ICD-10-CM

## 2024-04-18 DIAGNOSIS — E66.01 OBESITY, MORBID (MULTI): ICD-10-CM

## 2024-04-18 DIAGNOSIS — I73.89 OTHER SPECIFIED PERIPHERAL VASCULAR DISEASES (CMS-HCC): ICD-10-CM

## 2024-04-18 DIAGNOSIS — E11.9 TYPE 2 DIABETES MELLITUS WITHOUT COMPLICATION, WITHOUT LONG-TERM CURRENT USE OF INSULIN (MULTI): ICD-10-CM

## 2024-04-18 DIAGNOSIS — H10.403 CHRONIC CONJUNCTIVITIS OF BOTH EYES, UNSPECIFIED CHRONIC CONJUNCTIVITIS TYPE: ICD-10-CM

## 2024-04-18 DIAGNOSIS — E11.69 TYPE 2 DIABETES MELLITUS WITH OTHER SPECIFIED COMPLICATION, WITHOUT LONG-TERM CURRENT USE OF INSULIN (MULTI): Primary | ICD-10-CM

## 2024-04-18 DIAGNOSIS — F32.1 CURRENT MODERATE EPISODE OF MAJOR DEPRESSIVE DISORDER WITHOUT PRIOR EPISODE (MULTI): ICD-10-CM

## 2024-04-18 LAB — POC HEMOGLOBIN A1C: 6.2 % (ref 4.2–6.5)

## 2024-04-18 RX ORDER — TOBRAMYCIN AND DEXAMETHASONE 3; 1 MG/ML; MG/ML
1 SUSPENSION/ DROPS OPHTHALMIC
Qty: 10 ML | Refills: 2 | Status: SHIPPED | OUTPATIENT
Start: 2024-04-18 | End: 2024-04-28

## 2024-04-18 ASSESSMENT — ENCOUNTER SYMPTOMS
NEUROLOGICAL NEGATIVE: 1
MUSCULOSKELETAL NEGATIVE: 1
ALLERGIC/IMMUNOLOGIC NEGATIVE: 1
CONSTITUTIONAL NEGATIVE: 1
PSYCHIATRIC NEGATIVE: 1
HEMATOLOGIC/LYMPHATIC NEGATIVE: 1
RESPIRATORY NEGATIVE: 1
GASTROINTESTINAL NEGATIVE: 1
CARDIOVASCULAR NEGATIVE: 1

## 2024-04-18 ASSESSMENT — COLUMBIA-SUICIDE SEVERITY RATING SCALE - C-SSRS
6. HAVE YOU EVER DONE ANYTHING, STARTED TO DO ANYTHING, OR PREPARED TO DO ANYTHING TO END YOUR LIFE?: NO
2. HAVE YOU ACTUALLY HAD ANY THOUGHTS OF KILLING YOURSELF?: NO
1. IN THE PAST MONTH, HAVE YOU WISHED YOU WERE DEAD OR WISHED YOU COULD GO TO SLEEP AND NOT WAKE UP?: NO

## 2024-04-18 ASSESSMENT — PATIENT HEALTH QUESTIONNAIRE - PHQ9
SUM OF ALL RESPONSES TO PHQ9 QUESTIONS 1 AND 2: 0
1. LITTLE INTEREST OR PLEASURE IN DOING THINGS: NOT AT ALL
2. FEELING DOWN, DEPRESSED OR HOPELESS: NOT AT ALL

## 2024-04-18 ASSESSMENT — PAIN SCALES - GENERAL: PAINLEVEL: 4

## 2024-04-18 NOTE — PROGRESS NOTES
"Subjective   Patient ID: Vincent Ramirez \"Car\" is a 73 y.o. male who presents for Diabetes. A1c was 6.2  Today he is accompanied by alone.     Car is here for follow-up appointment for his diabetes.  His A1c done today was 6.2.  He reports that he is tolerating the Ozempic well.  He has lost 5 pounds in the last 2 months.  He is a little frustrated that he has not lost more.  We will increase his Ozempic to 2 mg.  He reports that he did follow-up with urology for the elevated PSA.  They did do biopsies which showed he had cancer in one of the 12 biopsy sites.  He reports that urology is going to follow him closely and no treatment is needed at this point.  His blood pressure today was 118/74.  He reports that he struggles with chronic gritty goopy eyes.  He saw allergy last year who gave him tobramycin drops which were helpful.  He is asking for this to be refilled.  He has been unable to get into an ophthalmologist.        Review of Systems   Constitutional: Negative.    HENT: Negative.     Respiratory: Negative.     Cardiovascular: Negative.    Gastrointestinal: Negative.    Genitourinary: Negative.    Musculoskeletal: Negative.    Skin: Negative.    Allergic/Immunologic: Negative.    Neurological: Negative.    Hematological: Negative.    Psychiatric/Behavioral: Negative.         Objective   /74 (BP Location: Right arm)   Pulse 76   Ht 1.803 m (5' 11\")   Wt 120 kg (263 lb 8 oz)   SpO2 93%   BMI 36.75 kg/m²   BSA: 2.45 meters squared  Growth percentiles: Facility age limit for growth %adry is 20 years. Facility age limit for growth %adry is 20 years.     Physical Exam  Vitals and nursing note reviewed.   Constitutional:       Appearance: Normal appearance. He is normal weight.   HENT:      Head: Normocephalic.      Nose: Nose normal.      Mouth/Throat:      Mouth: Mucous membranes are moist.      Pharynx: Oropharynx is clear.   Eyes:      Extraocular Movements: Extraocular movements intact.      " "Conjunctiva/sclera: Conjunctivae normal.      Pupils: Pupils are equal, round, and reactive to light.   Cardiovascular:      Rate and Rhythm: Normal rate and regular rhythm.      Pulses: Normal pulses.      Heart sounds: Normal heart sounds.   Pulmonary:      Effort: Pulmonary effort is normal.      Breath sounds: Normal breath sounds.   Abdominal:      General: Abdomen is flat. Bowel sounds are normal.      Palpations: Abdomen is soft.   Musculoskeletal:         General: Normal range of motion.      Cervical back: Normal range of motion and neck supple.   Skin:     General: Skin is warm and dry.      Capillary Refill: Capillary refill takes less than 2 seconds.   Neurological:      General: No focal deficit present.      Mental Status: He is alert. Mental status is at baseline.   Psychiatric:         Mood and Affect: Mood normal.         Behavior: Behavior normal.         Thought Content: Thought content normal.         Judgment: Judgment normal.       /74 (BP Location: Right arm)   Pulse 76   Ht 1.803 m (5' 11\")   Wt 120 kg (263 lb 8 oz)   SpO2 93%   BMI 36.75 kg/m²    Lab Results   Component Value Date    GLUCOSE 104 (H) 01/03/2024    CALCIUM 9.7 01/03/2024     01/03/2024    K 4.7 01/03/2024    CO2 28 01/03/2024     01/03/2024    BUN 15 01/03/2024    CREATININE 0.89 01/03/2024        Assessment/Plan   Problem List Items Addressed This Visit       Current moderate episode of major depressive disorder without prior episode (Multi)    Other specified peripheral vascular diseases (CMS-[facility])    Obesity, morbid (Multi)      BMI 36.75    Prostate cancer (Multi)     Following with urology     1 out of 12  biopsies were positive    Type 2 diabetes mellitus, without long-term current use of insulin (Multi) - Primary    Relevant Medications    semaglutide 2 mg/dose (8 mg/3 mL) pen injector (Start on 4/21/2024)    Other Relevant Orders    POCT glycosylated hemoglobin (Hb A1C) manually resulted " (Completed)     Other Visit Diagnoses       Chronic conjunctivitis of both eyes, unspecified chronic conjunctivitis type        Relevant Medications    tobramycin-dexamethasone (Tobradex) ophthalmic suspension        It was great to see you today!  Please continue taking your prescribed medications.   Refill have been sent to your pharmacy.    Please increase Ozempic to 2 mg  Follow up in 3 months

## 2024-06-10 ENCOUNTER — OFFICE VISIT (OUTPATIENT)
Dept: PAIN MEDICINE | Facility: HOSPITAL | Age: 74
End: 2024-06-10
Payer: MEDICARE

## 2024-06-10 VITALS
RESPIRATION RATE: 16 BRPM | BODY MASS INDEX: 36.26 KG/M2 | HEIGHT: 71 IN | DIASTOLIC BLOOD PRESSURE: 74 MMHG | TEMPERATURE: 96.8 F | HEART RATE: 65 BPM | WEIGHT: 259 LBS | SYSTOLIC BLOOD PRESSURE: 121 MMHG

## 2024-06-10 DIAGNOSIS — M54.50 CHRONIC BILATERAL LOW BACK PAIN WITHOUT SCIATICA: ICD-10-CM

## 2024-06-10 DIAGNOSIS — G89.29 CHRONIC BILATERAL LOW BACK PAIN WITHOUT SCIATICA: ICD-10-CM

## 2024-06-10 DIAGNOSIS — M47.817 FACET ARTHROPATHY, LUMBOSACRAL: Primary | ICD-10-CM

## 2024-06-10 DIAGNOSIS — Z79.899 MEDICATION MANAGEMENT: ICD-10-CM

## 2024-06-10 PROCEDURE — 80307 DRUG TEST PRSMV CHEM ANLYZR: CPT | Mod: MUE | Performed by: NURSE PRACTITIONER

## 2024-06-10 PROCEDURE — 80365 DRUG SCREENING OXYCODONE: CPT | Performed by: NURSE PRACTITIONER

## 2024-06-10 PROCEDURE — 99214 OFFICE O/P EST MOD 30 MIN: CPT | Performed by: NURSE PRACTITIONER

## 2024-06-10 RX ORDER — GABAPENTIN 100 MG/1
200 CAPSULE ORAL NIGHTLY
Qty: 180 CAPSULE | Refills: 3 | Status: SHIPPED | OUTPATIENT
Start: 2024-06-10

## 2024-06-10 ASSESSMENT — ENCOUNTER SYMPTOMS
MYALGIAS: 1
ENDOCRINE NEGATIVE: 1
ARTHRALGIAS: 1
CARDIOVASCULAR NEGATIVE: 1
RESPIRATORY NEGATIVE: 1
NECK PAIN: 0
JOINT SWELLING: 0
NECK STIFFNESS: 0
BACK PAIN: 1
CONSTITUTIONAL NEGATIVE: 1
ALLERGIC/IMMUNOLOGIC NEGATIVE: 1
EYES NEGATIVE: 1
NEUROLOGICAL NEGATIVE: 1
PSYCHIATRIC NEGATIVE: 1
GASTROINTESTINAL NEGATIVE: 1

## 2024-06-10 ASSESSMENT — PAIN SCALES - GENERAL: PAINLEVEL: 8

## 2024-06-10 NOTE — H&P (VIEW-ONLY)
"Subjective   Patient ID: Vincent Ramirez \"Car\" is a 74 y.o. male who presents for Follow-up (Lower back).    Vincent is a pleasant 74-year-old  male who is here for follow-up visit.  Patient is ambulatory.  Gait is steady.  He arrives with his spouse.  Patient has not been seen since 8/8/2023.    Patient continues to have chronic lower back pain.  He rates his pain as 8 out of 10.  Pain can be constant or comes and goes depending on his activities.  He describes his pain as aching, shooting and tightness kind of pain.  Back pain is aggravated with prolonged standing, walking or certain activities.  He reports that he would just be in the kitchen and his back stiffened up causing more pain.  He denies pain, numbness or tingling sensation to his legs.  He denies leg weakness or change in balance.  He denies bowel or bladder incontinence.  He denies recent falls, injuries, or ER visits.    Patient continues to take gabapentin 100 mg at night.  He reports his last dose was last week as he ran out of his prescription.  He takes Tylenol or ibuprofen for pain relief.  Patient had physical therapy in the past and is continuing his home stretching exercise with no relief at this point.  He had bilateral L4-L5, L5-S1 RFA on 1/24/2023.  He reports he obtained more than 50% relief that lasted for more than a year.    Patient also mentioned that he is on Ozempic.  He mentioned that shortly after his dose was increased that he was having back pain going down to his groin.  He denies urinary symptoms.  I informed him to contact his PCP on this and he voiced understanding.    I have reviewed previous notes.  I discussed the plan of care including pharmacologic and joint interventional procedure.  I discussed repeating the lumbar RFA and he is in agreement to proceed to this procedure.  This is done under fluoroscopy.  Questions were answered during this encounter.    ION was done today where he scored 33.  This form was " scanned and included in this note.    ---------------  PROCEDURES:    3/10/2023: Left shoulder injection  1/24/2023: Bilateral L4-L5, L5-S1 RFA  12/20/2022: Bilateral L4-L5, L5-S1 diagnostic MBB #2  10/10/2022: Bilateral L4-L5, L5-S1 diagnostic MBB #1  -------------          OARRS:  Carlene Uriarte, BRITTANY-CNP, DNP on 6/10/2024 11:15 AM  I have personally reviewed the OARRS report for Vincent Ramirez. I have considered the risks of abuse, dependence, addiction and diversion    Past Medical History  He has a past medical history of Acute sinusitis, unspecified (12/26/2023), Cough (12/26/2023), and Personal history of non-Hodgkin lymphomas (02/13/2014).    Surgical History  Past Surgical History:   Procedure Laterality Date    APPENDECTOMY  07/27/2021    Appendectomy    HAND SURGERY  02/13/2014    Hand Surgery                                                                                                                                                          HERNIA REPAIR  07/27/2021    Hernia Repair    OTHER SURGICAL HISTORY  02/13/2014    Nasal Endoscopy Polypectomy    OTHER SURGICAL HISTORY  07/27/2021    Throat Surgery    TONSILLECTOMY  07/27/2021    Tonsillectomy        Social History  He reports that he has never smoked. He has never used smokeless tobacco. He reports that he does not currently use alcohol. He reports that he does not use drugs.    Family History  Family History   Problem Relation Name Age of Onset    No Known Problems Other          Allergies  Penicillins    Medications    Current Outpatient Medications:     alfuzosin (Uroxatral) 10 mg 24 hr tablet, Take 1 tablet (10 mg) by mouth once daily., Disp: 90 tablet, Rfl: 1    aspirin 81 mg EC tablet, Take 1 tablet (81 mg) by mouth once daily., Disp: , Rfl:     ergocalciferol (Vitamin D-2) 1.25 MG (13634 UT) capsule, Take 1 capsule (50,000 Units) by mouth 1 (one) time per week., Disp: 30 capsule, Rfl: 1    oxybutynin (Ditropan) 5 mg tablet, Take 1 tablet  (5 mg) by mouth 2 times a day., Disp: 180 tablet, Rfl: 1    semaglutide 2 mg/dose (8 mg/3 mL) pen injector, Inject 2 mg under the skin 1 (one) time per week., Disp: 3 mL, Rfl: 3    sertraline (Zoloft) 50 mg tablet, Take 1 tablet (50 mg) by mouth once daily., Disp: 90 tablet, Rfl: 1    traZODone (Desyrel) 100 mg tablet, Take 1 tablet (100 mg) by mouth once daily at bedtime., Disp: 90 tablet, Rfl: 1    gabapentin (Neurontin) 100 mg capsule, Take 2 capsules (200 mg) by mouth once daily at bedtime., Disp: 180 capsule, Rfl: 3     Review of Systems   Constitutional: Negative.    HENT: Negative.     Eyes: Negative.    Respiratory: Negative.     Cardiovascular: Negative.    Gastrointestinal: Negative.    Endocrine: Negative.    Genitourinary: Negative.    Musculoskeletal:  Positive for arthralgias, back pain and myalgias. Negative for gait problem, joint swelling, neck pain and neck stiffness.   Skin: Negative.    Allergic/Immunologic: Negative.    Neurological: Negative.    Psychiatric/Behavioral: Negative.          Physical Exam  Vitals and nursing note reviewed.   HENT:      Head: Normocephalic.      Nose: Nose normal.   Eyes:      Extraocular Movements: Extraocular movements intact.      Conjunctiva/sclera: Conjunctivae normal.      Pupils: Pupils are equal, round, and reactive to light.   Cardiovascular:      Rate and Rhythm: Normal rate and regular rhythm.   Pulmonary:      Effort: Pulmonary effort is normal.      Breath sounds: Normal breath sounds.   Musculoskeletal:         General: Tenderness present. No swelling, deformity or signs of injury.      Cervical back: No rigidity or tenderness.      Lumbar back: Tenderness present.      Right lower leg: No edema.      Left lower leg: No edema.      Comments: Negative leg raise.  Positive for paraspinal tenderness at the lumbar region bilaterally at L4-L5, L5-S1 with rotation.  No radicular symptoms.  BUE 5/5, BLE 5/5.   Skin:     General: Skin is warm and dry.    Neurological:      General: No focal deficit present.      Mental Status: He is alert and oriented to person, place, and time.   Psychiatric:         Mood and Affect: Mood normal.         Behavior: Behavior normal.          Last Recorded Vitals  /74   Pulse 65   Temp 36 °C (96.8 °F) (Temporal)   Resp 16   Wt 117 kg (259 lb)     Relevant Results      Pain Management Panel  More data exists         Latest Ref Rng & Units 8/8/2023 4/5/2023   Pain Management Panel   Amphetamine Screen, Urine NEGATIVE PRESUMPTIVE NEGATIVE  PRESUMPTIVE NEGATIVE    Barbiturate Screen, Urine NEGATIVE PRESUMPTIVE NEGATIVE  PRESUMPTIVE NEGATIVE    Fentanyl Screen, Urine NEGATIVE PRESUMPTIVE NEGATIVE  PRESUMPTIVE NEGATIVE    Methadone Screen, Urine NEGATIVE PRESUMPTIVE NEGATIVE  PRESUMPTIVE NEGATIVE            Media Information              -----------  Narrative & Impression   MRN: [id number]  Patient Name: MARLYS LIND     STUDY:  SPINE, LUMBOSACRAL  MIN 4 VIEWS;  7/7/2022 8:59 am     INDICATION:  low back pain not better with meds  M54.50: Low back pain.     COMPARISON:  02/27/2014     ACCESSION NUMBER(S):  [id number]     ORDERING CLINICIAN:  KIMO CAMARENA     FINDINGS:  Five views of the lumbar spine including  AP, lateral, lateral  cone-down and bilateral oblique views were obtained.  There is no  acute fracture identified.  The vertebral bodies are well aligned  without evidence of subluxation.  Mild-to-moderate disc space  narrowing and bridging osteophyte formation is seen at the T12-L1 at  L1-2 levels, as well as throughout the visualized lower thoracic  spine. Bridging osteophytes with minimal disc space narrowing is  present at the L2-3 and L3-4 levels. Moderate facet degenerative  changes are seen in the lower lumbar spine. There is no evidence of  pars interarticularis defect.     IMPRESSION:  1.  No evidence of acute fracture.  2.  Degenerative changes throughout the lumbar spine, as described  above.    ------------        Assessment/Plan   Problem List Items Addressed This Visit             ICD-10-CM    Chronic back pain M54.9, G89.29    Relevant Medications    gabapentin (Neurontin) 100 mg capsule    Other Relevant Orders    FL fluoro images no charge    Radiofrequency Ablation    Facet arthropathy, lumbosacral - Primary M47.817    Relevant Medications    gabapentin (Neurontin) 100 mg capsule    Other Relevant Orders    FL fluoro images no charge    Radiofrequency Ablation     Other Visit Diagnoses         Codes    Medication management     Z79.899    Relevant Orders    Opiate/Opioid/Benzo Prescription Compliance    OOB Internal Tracking                   1. Facet arthropathy, lumbosacral  - gabapentin (Neurontin) 100 mg capsule; Take 2 capsules (200 mg) by mouth once daily at bedtime.  Dispense: 180 capsule; Refill: 3  - FL fluoro images no charge; Future  - Radiofrequency Ablation; Future    2. Chronic bilateral low back pain without sciatica  - gabapentin (Neurontin) 100 mg capsule; Take 2 capsules (200 mg) by mouth once daily at bedtime.  Dispense: 180 capsule; Refill: 3  - FL fluoro images no charge; Future  - Radiofrequency Ablation; Future    3. Medication management  - Opiate/Opioid/Benzo Prescription Compliance; Future  - OOB Internal Tracking       Plan/Follow-up Instructions:   Continue taking gabapentin and you may take 200 mg at night.    ---------------    Your next appointment is with Dr. Benoit in:    [facility]    For pain block/injection on: 6/24/2024, bilateral L4-L5, L5-S1 radiofrequency ablation    LOCAL    No ibuprofen on this day    °You will receive a phone call the weekday before your appointment/procedure.   °Please KEEP your scheduled appointments.     °BRING your photo ID, insurance information, and a correct list of your home medications to EVERY visit.    °Please call our office at [phone] if you have any questions.     You will then be seen for a  postprocedure follow-up in 8 to 12 weeks    ---------------        ---------------------  Disclaimer: This note was created using voice recognition software. It was not corrected for typographical or grammatical errors, inadvertent word insertion, or any unintended errors. Please feel free to contact me for clarification.  -----------------    Time     Prep time on date of the patient encounter: 2  Time spent directly with patient/family/caregiver: 30   Documentation time: 2  Total time on date of patient encounter: 34        Carlene Uriarte DNP, APRN, FNP-C    [facility]  Office #: [phone]  Fax # [phone]

## 2024-06-10 NOTE — PROGRESS NOTES
"Subjective   Patient ID: Vincent Ramirez \"Car\" is a 74 y.o. male who presents for Follow-up (Lower back).    Vincent is a pleasant 74-year-old  male who is here for follow-up visit.  Patient is ambulatory.  Gait is steady.  He arrives with his spouse.  Patient has not been seen since 8/8/2023.    Patient continues to have chronic lower back pain.  He rates his pain as 8 out of 10.  Pain can be constant or comes and goes depending on his activities.  He describes his pain as aching, shooting and tightness kind of pain.  Back pain is aggravated with prolonged standing, walking or certain activities.  He reports that he would just be in the kitchen and his back stiffened up causing more pain.  He denies pain, numbness or tingling sensation to his legs.  He denies leg weakness or change in balance.  He denies bowel or bladder incontinence.  He denies recent falls, injuries, or ER visits.    Patient continues to take gabapentin 100 mg at night.  He reports his last dose was last week as he ran out of his prescription.  He takes Tylenol or ibuprofen for pain relief.  Patient had physical therapy in the past and is continuing his home stretching exercise with no relief at this point.  He had bilateral L4-L5, L5-S1 RFA on 1/24/2023.  He reports he obtained more than 50% relief that lasted for more than a year.    Patient also mentioned that he is on Ozempic.  He mentioned that shortly after his dose was increased that he was having back pain going down to his groin.  He denies urinary symptoms.  I informed him to contact his PCP on this and he voiced understanding.    I have reviewed previous notes.  I discussed the plan of care including pharmacologic and joint interventional procedure.  I discussed repeating the lumbar RFA and he is in agreement to proceed to this procedure.  This is done under fluoroscopy.  Questions were answered during this encounter.    ION was done today where he scored 33.  This form was " scanned and included in this note.    ---------------  PROCEDURES:    3/10/2023: Left shoulder injection  1/24/2023: Bilateral L4-L5, L5-S1 RFA  12/20/2022: Bilateral L4-L5, L5-S1 diagnostic MBB #2  10/10/2022: Bilateral L4-L5, L5-S1 diagnostic MBB #1  -------------          OARRS:  Carlene Uriarte, BRITTANY-CNP, DNP on 6/10/2024 11:15 AM  I have personally reviewed the OARRS report for Vincent Ramirez. I have considered the risks of abuse, dependence, addiction and diversion    Past Medical History  He has a past medical history of Acute sinusitis, unspecified (12/26/2023), Cough (12/26/2023), and Personal history of non-Hodgkin lymphomas (02/13/2014).    Surgical History  Past Surgical History:   Procedure Laterality Date    APPENDECTOMY  07/27/2021    Appendectomy    HAND SURGERY  02/13/2014    Hand Surgery                                                                                                                                                          HERNIA REPAIR  07/27/2021    Hernia Repair    OTHER SURGICAL HISTORY  02/13/2014    Nasal Endoscopy Polypectomy    OTHER SURGICAL HISTORY  07/27/2021    Throat Surgery    TONSILLECTOMY  07/27/2021    Tonsillectomy        Social History  He reports that he has never smoked. He has never used smokeless tobacco. He reports that he does not currently use alcohol. He reports that he does not use drugs.    Family History  Family History   Problem Relation Name Age of Onset    No Known Problems Other          Allergies  Penicillins    Medications    Current Outpatient Medications:     alfuzosin (Uroxatral) 10 mg 24 hr tablet, Take 1 tablet (10 mg) by mouth once daily., Disp: 90 tablet, Rfl: 1    aspirin 81 mg EC tablet, Take 1 tablet (81 mg) by mouth once daily., Disp: , Rfl:     ergocalciferol (Vitamin D-2) 1.25 MG (21628 UT) capsule, Take 1 capsule (50,000 Units) by mouth 1 (one) time per week., Disp: 30 capsule, Rfl: 1    oxybutynin (Ditropan) 5 mg tablet, Take 1 tablet  (5 mg) by mouth 2 times a day., Disp: 180 tablet, Rfl: 1    semaglutide 2 mg/dose (8 mg/3 mL) pen injector, Inject 2 mg under the skin 1 (one) time per week., Disp: 3 mL, Rfl: 3    sertraline (Zoloft) 50 mg tablet, Take 1 tablet (50 mg) by mouth once daily., Disp: 90 tablet, Rfl: 1    traZODone (Desyrel) 100 mg tablet, Take 1 tablet (100 mg) by mouth once daily at bedtime., Disp: 90 tablet, Rfl: 1    gabapentin (Neurontin) 100 mg capsule, Take 2 capsules (200 mg) by mouth once daily at bedtime., Disp: 180 capsule, Rfl: 3     Review of Systems   Constitutional: Negative.    HENT: Negative.     Eyes: Negative.    Respiratory: Negative.     Cardiovascular: Negative.    Gastrointestinal: Negative.    Endocrine: Negative.    Genitourinary: Negative.    Musculoskeletal:  Positive for arthralgias, back pain and myalgias. Negative for gait problem, joint swelling, neck pain and neck stiffness.   Skin: Negative.    Allergic/Immunologic: Negative.    Neurological: Negative.    Psychiatric/Behavioral: Negative.          Physical Exam  Vitals and nursing note reviewed.   HENT:      Head: Normocephalic.      Nose: Nose normal.   Eyes:      Extraocular Movements: Extraocular movements intact.      Conjunctiva/sclera: Conjunctivae normal.      Pupils: Pupils are equal, round, and reactive to light.   Cardiovascular:      Rate and Rhythm: Normal rate and regular rhythm.   Pulmonary:      Effort: Pulmonary effort is normal.      Breath sounds: Normal breath sounds.   Musculoskeletal:         General: Tenderness present. No swelling, deformity or signs of injury.      Cervical back: No rigidity or tenderness.      Lumbar back: Tenderness present.      Right lower leg: No edema.      Left lower leg: No edema.      Comments: Negative leg raise.  Positive for paraspinal tenderness at the lumbar region bilaterally at L4-L5, L5-S1 with rotation.  No radicular symptoms.  BUE 5/5, BLE 5/5.   Skin:     General: Skin is warm and dry.    Neurological:      General: No focal deficit present.      Mental Status: He is alert and oriented to person, place, and time.   Psychiatric:         Mood and Affect: Mood normal.         Behavior: Behavior normal.          Last Recorded Vitals  /74   Pulse 65   Temp 36 °C (96.8 °F) (Temporal)   Resp 16   Wt 117 kg (259 lb)     Relevant Results      Pain Management Panel  More data exists         Latest Ref Rng & Units 8/8/2023 4/5/2023   Pain Management Panel   Amphetamine Screen, Urine NEGATIVE PRESUMPTIVE NEGATIVE  PRESUMPTIVE NEGATIVE    Barbiturate Screen, Urine NEGATIVE PRESUMPTIVE NEGATIVE  PRESUMPTIVE NEGATIVE    Fentanyl Screen, Urine NEGATIVE PRESUMPTIVE NEGATIVE  PRESUMPTIVE NEGATIVE    Methadone Screen, Urine NEGATIVE PRESUMPTIVE NEGATIVE  PRESUMPTIVE NEGATIVE            Media Information              -----------  Narrative & Impression   MRN: [id number]  Patient Name: MARLYS LIND     STUDY:  SPINE, LUMBOSACRAL  MIN 4 VIEWS;  7/7/2022 8:59 am     INDICATION:  low back pain not better with meds  M54.50: Low back pain.     COMPARISON:  02/27/2014     ACCESSION NUMBER(S):  [id number]     ORDERING CLINICIAN:  KIMO CAMARENA     FINDINGS:  Five views of the lumbar spine including  AP, lateral, lateral  cone-down and bilateral oblique views were obtained.  There is no  acute fracture identified.  The vertebral bodies are well aligned  without evidence of subluxation.  Mild-to-moderate disc space  narrowing and bridging osteophyte formation is seen at the T12-L1 at  L1-2 levels, as well as throughout the visualized lower thoracic  spine. Bridging osteophytes with minimal disc space narrowing is  present at the L2-3 and L3-4 levels. Moderate facet degenerative  changes are seen in the lower lumbar spine. There is no evidence of  pars interarticularis defect.     IMPRESSION:  1.  No evidence of acute fracture.  2.  Degenerative changes throughout the lumbar spine, as described  above.    ------------        Assessment/Plan   Problem List Items Addressed This Visit             ICD-10-CM    Chronic back pain M54.9, G89.29    Relevant Medications    gabapentin (Neurontin) 100 mg capsule    Other Relevant Orders    FL fluoro images no charge    Radiofrequency Ablation    Facet arthropathy, lumbosacral - Primary M47.817    Relevant Medications    gabapentin (Neurontin) 100 mg capsule    Other Relevant Orders    FL fluoro images no charge    Radiofrequency Ablation     Other Visit Diagnoses         Codes    Medication management     Z79.899    Relevant Orders    Opiate/Opioid/Benzo Prescription Compliance    OOB Internal Tracking                   1. Facet arthropathy, lumbosacral  - gabapentin (Neurontin) 100 mg capsule; Take 2 capsules (200 mg) by mouth once daily at bedtime.  Dispense: 180 capsule; Refill: 3  - FL fluoro images no charge; Future  - Radiofrequency Ablation; Future    2. Chronic bilateral low back pain without sciatica  - gabapentin (Neurontin) 100 mg capsule; Take 2 capsules (200 mg) by mouth once daily at bedtime.  Dispense: 180 capsule; Refill: 3  - FL fluoro images no charge; Future  - Radiofrequency Ablation; Future    3. Medication management  - Opiate/Opioid/Benzo Prescription Compliance; Future  - OOB Internal Tracking       Plan/Follow-up Instructions:   Continue taking gabapentin and you may take 200 mg at night.    ---------------    Your next appointment is with Dr. Benoit in:    [facility]    For pain block/injection on: 6/24/2024, bilateral L4-L5, L5-S1 radiofrequency ablation    LOCAL    No ibuprofen on this day    °You will receive a phone call the weekday before your appointment/procedure.   °Please KEEP your scheduled appointments.     °BRING your photo ID, insurance information, and a correct list of your home medications to EVERY visit.    °Please call our office at [phone] if you have any questions.     You will then be seen for a  postprocedure follow-up in 8 to 12 weeks    ---------------        ---------------------  Disclaimer: This note was created using voice recognition software. It was not corrected for typographical or grammatical errors, inadvertent word insertion, or any unintended errors. Please feel free to contact me for clarification.  -----------------    Time     Prep time on date of the patient encounter: 2  Time spent directly with patient/family/caregiver: 30   Documentation time: 2  Total time on date of patient encounter: 34        Carlene Uriarte DNP, APRN, FNP-C    [facility]  Office #: [phone]  Fax # [phone]

## 2024-06-10 NOTE — PROGRESS NOTES
Last urine drug screening date/ordered today: 06/10/24  Results of last screen:  as expected      Opioid Risk Screening:   THE OPIOID RISK TOOL (ORT)                                                                      Female                     Male     1. Family History of Substance Abuse:                              Alcohol                                                   [1]=                           [3]  = 0    Illicit Drugs                                             [2] =                          [3]   =0    Prescription Drugs                                [4]=                           [4]   =0    2. Personal History of Substance Abuse:     Alcohol                                                    [3] =                          [3] = 0    Illegal Drugs                                           [4] =                           [4]  = 0    Prescription Drugs                                [5]=                            [5]   =0    3. Age (If between 16 to 45):               [1]=                           [1]   =0    4. History of Preadolescent Sexual Abuse                                                                  [3]=                            [0]   =0    5. Psychological Disease:    ADD, OCD, Bipolar, Schizophrenia    [2]=                            [2]   =0    Depression                                          [1]=                             [1]   =0      TOTAL Score =  0     Last opioid risk screening date/ordered today: 06/10/2024  Patient's total score is 0    Reference :  Low Score = 0 to 3  Moderate Score = 4 to 7  High Score = =8       Pain Scale Screening:   Pain Assessment and Documentation Tool (PADT)   Date of Assessment: 06/10/2024  Analgesia:   Patient reports her pain level on average during the past week is 8on a 0 - 10 scale.   Patient reports that her pain level at its worst during the past week was 10 on a 0 -10 scale.   -----------------------  0% of pain has been relieved  during the past week per patient   Patient states that the amount of pain relief she is now obtaining from her current pain reliever(s) is enough to make a real difference in her life.     Activities of Daily Living:   Physical functioning: worse  Family relationships: unchanged  Social relationships: unchanged  Mood: unchanged  Sleep patterns: unchanged  Overall functioning: worse  Adverse Events: No, Vincent Ramirez is not experiencing side effects from current pain reliever.  Patients overall severity of side effect:none

## 2024-06-10 NOTE — PATIENT INSTRUCTIONS
Continue taking gabapentin and you may take 200 mg at night.    ---------------    Your next appointment is with Dr. Benoit in:    [facility]    For pain block/injection on: 6/24/2024, bilateral L4-L5, L5-S1 radiofrequency ablation    LOCAL    No ibuprofen on this day    °You will receive a phone call the weekday before your appointment/procedure.   °Please KEEP your scheduled appointments.     °BRING your photo ID, insurance information, and a correct list of your home medications to EVERY visit.    °Please call our office at [phone] if you have any questions.     You will then be seen for a postprocedure follow-up in 8 to 12 weeks    ---------------

## 2024-06-11 LAB
AMPHETAMINES UR QL SCN: NORMAL
BARBITURATES UR QL SCN: NORMAL
BZE UR QL SCN: NORMAL
CANNABINOIDS UR QL SCN: NORMAL
CREAT UR-MCNC: 45.7 MG/DL (ref 20–370)
PCP UR QL SCN: NORMAL

## 2024-06-20 DIAGNOSIS — M47.816 LUMBAR SPONDYLOSIS: Primary | ICD-10-CM

## 2024-06-24 ENCOUNTER — HOSPITAL ENCOUNTER (OUTPATIENT)
Dept: PAIN MEDICINE | Facility: HOSPITAL | Age: 74
Discharge: HOME | End: 2024-06-24
Payer: MEDICARE

## 2024-06-24 VITALS
SYSTOLIC BLOOD PRESSURE: 149 MMHG | TEMPERATURE: 97.9 F | HEART RATE: 70 BPM | RESPIRATION RATE: 18 BRPM | BODY MASS INDEX: 35 KG/M2 | WEIGHT: 250 LBS | HEIGHT: 71 IN | DIASTOLIC BLOOD PRESSURE: 78 MMHG | OXYGEN SATURATION: 95 %

## 2024-06-24 DIAGNOSIS — M54.50 CHRONIC BILATERAL LOW BACK PAIN WITHOUT SCIATICA: ICD-10-CM

## 2024-06-24 DIAGNOSIS — G89.29 CHRONIC BILATERAL LOW BACK PAIN WITHOUT SCIATICA: ICD-10-CM

## 2024-06-24 DIAGNOSIS — M47.817 FACET ARTHROPATHY, LUMBOSACRAL: ICD-10-CM

## 2024-06-24 LAB — GLUCOSE BLD MANUAL STRIP-MCNC: 121 MG/DL (ref 74–99)

## 2024-06-24 PROCEDURE — 2500000005 HC RX 250 GENERAL PHARMACY W/O HCPCS: Performed by: ANESTHESIOLOGY

## 2024-06-24 PROCEDURE — 82947 ASSAY GLUCOSE BLOOD QUANT: CPT

## 2024-06-24 PROCEDURE — 2720000007 HC OR 272 NO HCPCS

## 2024-06-24 PROCEDURE — 2500000004 HC RX 250 GENERAL PHARMACY W/ HCPCS (ALT 636 FOR OP/ED): Performed by: ANESTHESIOLOGY

## 2024-06-24 RX ORDER — CYCLOBENZAPRINE HCL 10 MG
10 TABLET ORAL 3 TIMES DAILY PRN
COMMUNITY
Start: 2022-06-24 | End: 2024-06-24

## 2024-06-24 RX ORDER — METHYLPREDNISOLONE ACETATE 80 MG/ML
INJECTION, SUSPENSION INTRA-ARTICULAR; INTRALESIONAL; INTRAMUSCULAR; SOFT TISSUE AS NEEDED
Status: COMPLETED | OUTPATIENT
Start: 2024-06-24 | End: 2024-06-24

## 2024-06-24 RX ORDER — LIDOCAINE HYDROCHLORIDE 10 MG/ML
INJECTION, SOLUTION EPIDURAL; INFILTRATION; INTRACAUDAL; PERINEURAL AS NEEDED
Status: COMPLETED | OUTPATIENT
Start: 2024-06-24 | End: 2024-06-24

## 2024-06-24 RX ORDER — DICYCLOMINE HYDROCHLORIDE 10 MG/1
10 CAPSULE ORAL DAILY PRN
COMMUNITY
Start: 2020-01-03

## 2024-06-24 RX ORDER — BUPIVACAINE HYDROCHLORIDE 2.5 MG/ML
INJECTION, SOLUTION EPIDURAL; INFILTRATION; INTRACAUDAL AS NEEDED
Status: COMPLETED | OUTPATIENT
Start: 2024-06-24 | End: 2024-06-24

## 2024-06-24 RX ORDER — LIDOCAINE HYDROCHLORIDE 20 MG/ML
INJECTION, SOLUTION EPIDURAL; INFILTRATION; INTRACAUDAL; PERINEURAL AS NEEDED
Status: COMPLETED | OUTPATIENT
Start: 2024-06-24 | End: 2024-06-24

## 2024-06-24 ASSESSMENT — PAIN SCALES - GENERAL
PAINLEVEL_OUTOF10: 5 - MODERATE PAIN
PAINLEVEL_OUTOF10: 0 - NO PAIN

## 2024-06-24 ASSESSMENT — PATIENT HEALTH QUESTIONNAIRE - PHQ9
1. LITTLE INTEREST OR PLEASURE IN DOING THINGS: NOT AT ALL
2. FEELING DOWN, DEPRESSED OR HOPELESS: NOT AT ALL
SUM OF ALL RESPONSES TO PHQ9 QUESTIONS 1 AND 2: 0

## 2024-06-24 ASSESSMENT — PAIN - FUNCTIONAL ASSESSMENT
PAIN_FUNCTIONAL_ASSESSMENT: 0-10
PAIN_FUNCTIONAL_ASSESSMENT: 0-10

## 2024-06-24 ASSESSMENT — COLUMBIA-SUICIDE SEVERITY RATING SCALE - C-SSRS
1. IN THE PAST MONTH, HAVE YOU WISHED YOU WERE DEAD OR WISHED YOU COULD GO TO SLEEP AND NOT WAKE UP?: NO
6. HAVE YOU EVER DONE ANYTHING, STARTED TO DO ANYTHING, OR PREPARED TO DO ANYTHING TO END YOUR LIFE?: NO
2. HAVE YOU ACTUALLY HAD ANY THOUGHTS OF KILLING YOURSELF?: NO

## 2024-06-24 ASSESSMENT — PAIN DESCRIPTION - DESCRIPTORS: DESCRIPTORS: ACHING

## 2024-06-24 ASSESSMENT — ENCOUNTER SYMPTOMS
LOSS OF SENSATION IN FEET: 0
OCCASIONAL FEELINGS OF UNSTEADINESS: 0
DEPRESSION: 0

## 2024-06-24 NOTE — DISCHARGE INSTRUCTIONS
Discharge Instructions:   ° Keep Band-Aid on for the next 24 hours.    ° No showering/bathing for the next 24 hours.    ° You may notice soreness or increased pain in the area of your injection, which may continue for the first 48 hours.    ° Avoid driving or operating any heavy machinery until the next day after the procedure.  ° You should resume any medications and your regular diet after the procedure.  ° You may resume regular daily activity but should avoid strenuous activity the day of the procedure.  Some of the side affects you may experience from the steroids are:  ° Insomnia (inability to sleep)  ° Increased sweating  ° Headaches  ° Increased fluid retention (swelling of your extremities)  ° Increase appetite  ° Face flushing  ° If you are a diabetic, your blood sugars may go up.  Closely monitor your diet.  Your blood sugar should return to normal in a few days.  Complications:   ° Complications are rare with the most common being temporary increase pain near the injection site. You can apply ice to affected area on the day of the procedure.   ° If the discomfort persists, apply moist heat to the area. Serious complications are very uncommon but may include bleeding, infection or nerve damage.   ° If severe pain, fever, redness or swelling near the injection site, have someone take you to the nearest emergency room to be evaluated for procedure complications or infection.  Expectations:   ° Local anesthetics wear off in several hours but duration of relief varies from individual to individual.     If you have any questions, please call the office at [phone].    If this is an emergency, call [phone] or go to your nearest hospital.

## 2024-07-18 ENCOUNTER — APPOINTMENT (OUTPATIENT)
Dept: PRIMARY CARE | Facility: CLINIC | Age: 74
End: 2024-07-18
Payer: MEDICARE

## 2024-07-22 ENCOUNTER — APPOINTMENT (OUTPATIENT)
Dept: PRIMARY CARE | Facility: CLINIC | Age: 74
End: 2024-07-22
Payer: MEDICARE

## 2024-07-22 VITALS
SYSTOLIC BLOOD PRESSURE: 98 MMHG | DIASTOLIC BLOOD PRESSURE: 65 MMHG | OXYGEN SATURATION: 94 % | BODY MASS INDEX: 35.14 KG/M2 | HEART RATE: 74 BPM | WEIGHT: 251 LBS | HEIGHT: 71 IN

## 2024-07-22 DIAGNOSIS — C61 PROSTATE CANCER (MULTI): ICD-10-CM

## 2024-07-22 DIAGNOSIS — E11.9 TYPE 2 DIABETES MELLITUS WITHOUT COMPLICATION, WITHOUT LONG-TERM CURRENT USE OF INSULIN (MULTI): Primary | ICD-10-CM

## 2024-07-22 DIAGNOSIS — N40.0 BENIGN PROSTATIC HYPERPLASIA, UNSPECIFIED WHETHER LOWER URINARY TRACT SYMPTOMS PRESENT: ICD-10-CM

## 2024-07-22 DIAGNOSIS — F32.1 CURRENT MODERATE EPISODE OF MAJOR DEPRESSIVE DISORDER WITHOUT PRIOR EPISODE (MULTI): ICD-10-CM

## 2024-07-22 LAB — POC HEMOGLOBIN A1C: 5.5 % (ref 4.2–6.5)

## 2024-07-22 PROCEDURE — 1159F MED LIST DOCD IN RCRD: CPT

## 2024-07-22 PROCEDURE — 3048F LDL-C <100 MG/DL: CPT

## 2024-07-22 PROCEDURE — 1036F TOBACCO NON-USER: CPT

## 2024-07-22 PROCEDURE — 1125F AMNT PAIN NOTED PAIN PRSNT: CPT

## 2024-07-22 PROCEDURE — 3044F HG A1C LEVEL LT 7.0%: CPT

## 2024-07-22 PROCEDURE — 3078F DIAST BP <80 MM HG: CPT

## 2024-07-22 PROCEDURE — 3008F BODY MASS INDEX DOCD: CPT

## 2024-07-22 PROCEDURE — 3074F SYST BP LT 130 MM HG: CPT

## 2024-07-22 PROCEDURE — 1160F RVW MEDS BY RX/DR IN RCRD: CPT

## 2024-07-22 PROCEDURE — 99214 OFFICE O/P EST MOD 30 MIN: CPT

## 2024-07-22 PROCEDURE — 3060F POS MICROALBUMINURIA REV: CPT

## 2024-07-22 PROCEDURE — 1157F ADVNC CARE PLAN IN RCRD: CPT

## 2024-07-22 RX ORDER — SERTRALINE HYDROCHLORIDE 50 MG/1
50 TABLET, FILM COATED ORAL DAILY
Qty: 90 TABLET | Refills: 1 | Status: SHIPPED | OUTPATIENT
Start: 2024-07-22

## 2024-07-22 RX ORDER — ALFUZOSIN HYDROCHLORIDE 10 MG/1
10 TABLET, EXTENDED RELEASE ORAL DAILY
Qty: 90 TABLET | Refills: 1 | Status: SHIPPED | OUTPATIENT
Start: 2024-07-22

## 2024-07-22 RX ORDER — OXYBUTYNIN CHLORIDE 5 MG/1
5 TABLET ORAL 2 TIMES DAILY
COMMUNITY

## 2024-07-22 RX ORDER — ALFUZOSIN HYDROCHLORIDE 10 MG/1
10 TABLET, EXTENDED RELEASE ORAL DAILY
COMMUNITY
End: 2024-07-22 | Stop reason: SDUPTHER

## 2024-07-22 ASSESSMENT — ENCOUNTER SYMPTOMS
NEUROLOGICAL NEGATIVE: 1
MUSCULOSKELETAL NEGATIVE: 1
PSYCHIATRIC NEGATIVE: 1
ALLERGIC/IMMUNOLOGIC NEGATIVE: 1
CONSTITUTIONAL NEGATIVE: 1
GASTROINTESTINAL NEGATIVE: 1
HEMATOLOGIC/LYMPHATIC NEGATIVE: 1
RESPIRATORY NEGATIVE: 1
CARDIOVASCULAR NEGATIVE: 1

## 2024-07-22 ASSESSMENT — PAIN SCALES - GENERAL: PAINLEVEL: 4

## 2024-07-22 NOTE — PROGRESS NOTES
"Subjective   Patient ID: Vincent Ramirez \"Car\" is a 74 y.o. male who presents for Diabetes.  Today he is accompanied by accompanied by spouse.     Car is here for follow-up visit for his diabetes.  His A1c today was 5.5.  He is currently taking semaglutide 2 mg.  He states that he was having a pain across his abdomen to his back that occurred shortly after taking the injections that has since stopped.  He was instructed to let us know if the pain persists.  He is currently following with pain management for chronic low back pain and is getting injections.  He states there moderately effective.  Blood pressure today was 98/65 denies any dizziness.  No other complaints.        Review of Systems   Constitutional: Negative.    HENT: Negative.     Respiratory: Negative.     Cardiovascular: Negative.    Gastrointestinal: Negative.    Genitourinary: Negative.    Musculoskeletal: Negative.    Skin: Negative.    Allergic/Immunologic: Negative.    Neurological: Negative.    Hematological: Negative.    Psychiatric/Behavioral: Negative.         Objective   BP 98/65 (BP Location: Left arm)   Pulse 74   Ht 1.803 m (5' 11\")   Wt 114 kg (251 lb)   SpO2 94%   BMI 35.01 kg/m²   BSA: 2.39 meters squared  Growth percentiles: Facility age limit for growth %adry is 20 years. Facility age limit for growth %adry is 20 years.     Physical Exam  Vitals and nursing note reviewed.   Constitutional:       Appearance: Normal appearance. He is normal weight.   HENT:      Head: Normocephalic.      Nose: Nose normal.      Mouth/Throat:      Mouth: Mucous membranes are moist.      Pharynx: Oropharynx is clear.   Eyes:      Extraocular Movements: Extraocular movements intact.      Conjunctiva/sclera: Conjunctivae normal.      Pupils: Pupils are equal, round, and reactive to light.   Cardiovascular:      Rate and Rhythm: Normal rate and regular rhythm.      Pulses: Normal pulses.      Heart sounds: Normal heart sounds.   Pulmonary:      " "Effort: Pulmonary effort is normal.      Breath sounds: Normal breath sounds.   Abdominal:      General: Abdomen is flat. Bowel sounds are normal.      Palpations: Abdomen is soft.   Musculoskeletal:         General: Normal range of motion.      Cervical back: Normal range of motion and neck supple.   Skin:     General: Skin is warm and dry.      Capillary Refill: Capillary refill takes less than 2 seconds.   Neurological:      General: No focal deficit present.      Mental Status: He is alert. Mental status is at baseline.   Psychiatric:         Mood and Affect: Mood normal.         Behavior: Behavior normal.         Thought Content: Thought content normal.         Judgment: Judgment normal.     BP 98/65 (BP Location: Left arm)   Pulse 74   Ht 1.803 m (5' 11\")   Wt 114 kg (251 lb)   SpO2 94%   BMI 35.01 kg/m²    Lab Results   Component Value Date    GLUCOSE 104 (H) 01/03/2024    CALCIUM 9.7 01/03/2024     01/03/2024    K 4.7 01/03/2024    CO2 28 01/03/2024     01/03/2024    BUN 15 01/03/2024    CREATININE 0.89 01/03/2024        Assessment/Plan   Problem List Items Addressed This Visit       Current moderate episode of major depressive disorder without prior episode (Multi)    Relevant Medications    sertraline (Zoloft) 50 mg tablet    BPH (benign prostatic hyperplasia)    Relevant Medications    alfuzosin (Uroxatral) 10 mg 24 hr tablet    Prostate cancer (Multi)    Relevant Medications    alfuzosin (Uroxatral) 10 mg 24 hr tablet    Type 2 diabetes mellitus, without long-term current use of insulin (Multi) - Primary        A1c was 5.5   It was great to see you today!  Please continue taking your prescribed medications.   Refill have been sent to your pharmacy.    Please follow up in 3 months    "

## 2024-08-04 DIAGNOSIS — E11.69 TYPE 2 DIABETES MELLITUS WITH OTHER SPECIFIED COMPLICATION, WITHOUT LONG-TERM CURRENT USE OF INSULIN (MULTI): ICD-10-CM

## 2024-08-05 RX ORDER — SEMAGLUTIDE 2.68 MG/ML
INJECTION, SOLUTION SUBCUTANEOUS
Qty: 3 ML | Refills: 3 | Status: SHIPPED | OUTPATIENT
Start: 2024-08-05

## 2024-08-06 ENCOUNTER — LAB (OUTPATIENT)
Dept: LAB | Facility: LAB | Age: 74
End: 2024-08-06
Payer: MEDICARE

## 2024-08-06 DIAGNOSIS — C61 MALIGNANT NEOPLASM OF PROSTATE (MULTI): Primary | ICD-10-CM

## 2024-08-06 PROCEDURE — 36415 COLL VENOUS BLD VENIPUNCTURE: CPT

## 2024-08-06 PROCEDURE — 84154 ASSAY OF PSA FREE: CPT

## 2024-08-06 PROCEDURE — 84153 ASSAY OF PSA TOTAL: CPT

## 2024-08-08 LAB
PSA FREE MFR SERPL: 9 %
PSA FREE SERPL-MCNC: 0.8 NG/ML
PSA SERPL IA-MCNC: 8.7 NG/ML (ref 0–4)

## 2024-08-15 DIAGNOSIS — G47.9 SLEEP DISORDER, UNSPECIFIED: ICD-10-CM

## 2024-08-15 RX ORDER — TRAZODONE HYDROCHLORIDE 100 MG/1
100 TABLET ORAL NIGHTLY
Qty: 90 TABLET | Refills: 1 | Status: SHIPPED | OUTPATIENT
Start: 2024-08-15 | End: 2025-02-11

## 2024-08-21 ENCOUNTER — OFFICE VISIT (OUTPATIENT)
Dept: PAIN MEDICINE | Facility: HOSPITAL | Age: 74
End: 2024-08-21
Payer: MEDICARE

## 2024-08-21 VITALS
TEMPERATURE: 96.8 F | SYSTOLIC BLOOD PRESSURE: 118 MMHG | BODY MASS INDEX: 34.79 KG/M2 | HEIGHT: 71 IN | WEIGHT: 248.5 LBS | OXYGEN SATURATION: 95 % | RESPIRATION RATE: 14 BRPM | DIASTOLIC BLOOD PRESSURE: 73 MMHG | HEART RATE: 80 BPM

## 2024-08-21 DIAGNOSIS — M47.817 FACET ARTHROPATHY, LUMBOSACRAL: ICD-10-CM

## 2024-08-21 DIAGNOSIS — M46.1 SACROILIITIS (CMS-HCC): Primary | ICD-10-CM

## 2024-08-21 PROCEDURE — 1125F AMNT PAIN NOTED PAIN PRSNT: CPT | Performed by: NURSE PRACTITIONER

## 2024-08-21 PROCEDURE — 1157F ADVNC CARE PLAN IN RCRD: CPT | Performed by: NURSE PRACTITIONER

## 2024-08-21 PROCEDURE — 3060F POS MICROALBUMINURIA REV: CPT | Performed by: NURSE PRACTITIONER

## 2024-08-21 PROCEDURE — 3078F DIAST BP <80 MM HG: CPT | Performed by: NURSE PRACTITIONER

## 2024-08-21 PROCEDURE — 1036F TOBACCO NON-USER: CPT | Performed by: NURSE PRACTITIONER

## 2024-08-21 PROCEDURE — 3048F LDL-C <100 MG/DL: CPT | Performed by: NURSE PRACTITIONER

## 2024-08-21 PROCEDURE — 99214 OFFICE O/P EST MOD 30 MIN: CPT | Performed by: NURSE PRACTITIONER

## 2024-08-21 PROCEDURE — 3008F BODY MASS INDEX DOCD: CPT | Performed by: NURSE PRACTITIONER

## 2024-08-21 PROCEDURE — 3074F SYST BP LT 130 MM HG: CPT | Performed by: NURSE PRACTITIONER

## 2024-08-21 PROCEDURE — 3044F HG A1C LEVEL LT 7.0%: CPT | Performed by: NURSE PRACTITIONER

## 2024-08-21 PROCEDURE — 1160F RVW MEDS BY RX/DR IN RCRD: CPT | Performed by: NURSE PRACTITIONER

## 2024-08-21 PROCEDURE — 1159F MED LIST DOCD IN RCRD: CPT | Performed by: NURSE PRACTITIONER

## 2024-08-21 RX ORDER — GABAPENTIN 300 MG/1
300 CAPSULE ORAL 2 TIMES DAILY
Qty: 60 CAPSULE | Refills: 0 | Status: SHIPPED | OUTPATIENT
Start: 2024-08-21

## 2024-08-21 ASSESSMENT — ENCOUNTER SYMPTOMS
ARTHRALGIAS: 1
GASTROINTESTINAL NEGATIVE: 1
NECK PAIN: 0
ENDOCRINE NEGATIVE: 1
JOINT SWELLING: 0
EYES NEGATIVE: 1
CONSTITUTIONAL NEGATIVE: 1
NEUROLOGICAL NEGATIVE: 1
RESPIRATORY NEGATIVE: 1
BACK PAIN: 1
PSYCHIATRIC NEGATIVE: 1
ALLERGIC/IMMUNOLOGIC NEGATIVE: 1
CARDIOVASCULAR NEGATIVE: 1
NECK STIFFNESS: 0
MYALGIAS: 1

## 2024-08-21 ASSESSMENT — PAIN SCALES - GENERAL: PAINLEVEL: 10-WORST PAIN EVER

## 2024-08-21 NOTE — PATIENT INSTRUCTIONS
I increase your gabapentin.  Start taking 300 mg twice a day if tolerated.    Continue with your prescribed medications.    I ordered physical therapy and you may have it done anytime    ---------------    Your next appointment is with Dr. Benoit in:    [facility]    For pain block/injection on: 9/16/2024, right sacroiliac intra-articular joint injection    LOCAL    No aspirin on this day    °You will receive a phone call the weekday before your appointment/procedure.   °Please KEEP your scheduled appointments.     °BRING your photo ID, insurance information, and a correct list of your home medications to EVERY visit.    °Please call our office at [phone] if you have any questions.     You will then be seen for a postprocedure follow-up in 6 to 8 weeks  ---------------

## 2024-08-21 NOTE — H&P (VIEW-ONLY)
"History Of Present Illness  Vincent Ramirez \"Car\" is a pleasant 74 y.o. male who is here for postprocedure follow-up.  Patient is ambulatory.  Gait is steady.  He arrives with his spouse.    Patient had bilateral L4-L5, L5-S1 RFA on 6/24/2024.  The procedure has improved his back pain.  He reports it provided 90 to 100% relief.  He is still feeling better.  The procedure has improved his pain, sleep, ability to participate in his daily activities and ability to enjoy social activities.    Patient continues to have chronic lower back pain.  He reports pain is more at the right lower side affecting his right buttocks.  He has stinging, numbness and tingling sensation at the buttocks area.  He rates his pain as 10 out of 10.  Pain is constant.  He describes it as aching, sharp, shooting and tender kind of pain.  Pain to the right buttocks interferes with his activities.  He reports that it has worsened the past week that he is unable to walk without any pain.  He has been sedentary because of the pain.  Walking, standing and sitting aggravates the pain.  He denies pain, numbness or tingling sensation to his legs.  He denies leg weakness or change in balance.  He denies bowel or bladder incontinence.  He denies recent falls, injuries, or ER visits.  There has been no change since he was last seen.    Patient continues to take gabapentin.  It was ordered for 100 mg however he has been taking this at 300 mg due to the increasing pain.  He reports that medication did not alleviate the pain.  He continues to take Tylenol ibuprofen as needed.  He had physical therapy in the past with no relief.  He continues to do his home stretching exercise.    I reviewed previous notes and imaging.  I discussed the plan of care including pharmacologic and joint interventional procedure.  I discussed SI joint injection and he is in agreement to proceed to this procedure.  This is done under fluoroscopy.  Questions were answered during " this encounter.     The patient was counseled regarding diagnostic results, instructions for management, risk factor reductions, impressions, risks and benefits of treatment options and importance of compliance with treatment.    --------------  PROCEDURES:    6/24/2024: Bilateral L4-L5, L5-S1 RFA  3/10/2023: Left shoulder injection  1/24/2023: Bilateral L4-L5, L5-S1 RFA  12/20/2022: Bilateral L4-L5, L5-S1 diagnostic MBB #2  10/10/2022: Bilateral L4-L5, L5-S1 diagnostic MBB #1  -------------    OARRS:  Carlene Uriarte, APRN-CNP, DNP on 8/21/2024 10:09 AM  I have personally reviewed the OARRS report for Vincent ESTRELLA James. I have considered the risks of abuse, dependence, addiction and diversion    Past Medical History  He has a past medical history of Acute sinusitis, unspecified (12/26/2023), Cough (12/26/2023), Diabetes (Multi), and Personal history of non-Hodgkin lymphomas (02/13/2014).    Surgical History  Past Surgical History:   Procedure Laterality Date    APPENDECTOMY  07/27/2021    Appendectomy    HAND SURGERY  02/13/2014    Hand Surgery                                                                                                                                                          HERNIA REPAIR  07/27/2021    Hernia Repair    OTHER SURGICAL HISTORY  02/13/2014    Nasal Endoscopy Polypectomy    OTHER SURGICAL HISTORY  07/27/2021    Throat Surgery    TONSILLECTOMY  07/27/2021    Tonsillectomy        Social History  He reports that he has never smoked. He has never used smokeless tobacco. He reports that he does not currently use alcohol. He reports that he does not use drugs.    Family History  Family History   Problem Relation Name Age of Onset    No Known Problems Other          Allergies  Penicillins    Medications    Current Outpatient Medications:     alfuzosin (Uroxatral) 10 mg 24 hr tablet, Take 1 tablet (10 mg) by mouth once daily. Do not crush, chew, or split., Disp: 90 tablet, Rfl: 1    aspirin 81  mg EC tablet, Take 1 tablet (81 mg) by mouth once daily., Disp: , Rfl:     dicyclomine (Bentyl) 10 mg capsule, Take 1 capsule (10 mg) by mouth once daily as needed., Disp: , Rfl:     ergocalciferol (Vitamin D-2) 1.25 MG (01250 UT) capsule, Take 1 capsule (50,000 Units) by mouth 1 (one) time per week., Disp: 30 capsule, Rfl: 1    gabapentin (Neurontin) 100 mg capsule, Take 2 capsules (200 mg) by mouth once daily at bedtime., Disp: 180 capsule, Rfl: 3    oxybutynin (Ditropan) 5 mg tablet, Take 1 tablet (5 mg) by mouth 2 times a day., Disp: , Rfl:     Ozempic 2 mg/dose (8 mg/3 mL) pen injector, Inject 2 mg under the skin 1 (one) time per week., Disp: 3 mL, Rfl: 3    sertraline (Zoloft) 50 mg tablet, Take 1 tablet (50 mg) by mouth once daily., Disp: 90 tablet, Rfl: 1    traZODone (Desyrel) 100 mg tablet, Take 1 tablet (100 mg) by mouth once daily at bedtime., Disp: 90 tablet, Rfl: 1    gabapentin (Neurontin) 300 mg capsule, Take 1 capsule (300 mg) by mouth 2 times a day., Disp: 60 capsule, Rfl: 0     Review of Systems   Constitutional: Negative.    HENT: Negative.     Eyes: Negative.    Respiratory: Negative.     Cardiovascular: Negative.    Gastrointestinal: Negative.    Endocrine: Negative.    Genitourinary: Negative.    Musculoskeletal:  Positive for arthralgias, back pain and myalgias. Negative for gait problem, joint swelling, neck pain and neck stiffness.   Skin: Negative.    Allergic/Immunologic: Negative.    Neurological: Negative.    Psychiatric/Behavioral: Negative.          Physical Exam  Vitals and nursing note reviewed.   HENT:      Head: Normocephalic.      Nose: Nose normal.   Eyes:      Extraocular Movements: Extraocular movements intact.      Conjunctiva/sclera: Conjunctivae normal.      Pupils: Pupils are equal, round, and reactive to light.   Cardiovascular:      Rate and Rhythm: Normal rate and regular rhythm.   Pulmonary:      Effort: Pulmonary effort is normal.      Breath sounds: Normal breath  sounds.   Musculoskeletal:         General: Tenderness present. No swelling, deformity or signs of injury.      Cervical back: No rigidity or tenderness.      Lumbar back: Tenderness present.      Right lower leg: No edema.      Left lower leg: No edema.      Comments: Negative leg raise.  Negative for paraspinal tenderness at the lumbar region.  No radicular symptoms.  Positive for right SI joint pain on palpation.  Positive right Fabere's test eliciting back pain.  Positive for provocative test including Fabere's test, compression and distraction.  BUE 5/5, RLE 4/5, LLE 5/5.   Skin:     General: Skin is warm and dry.   Neurological:      General: No focal deficit present.      Mental Status: He is alert and oriented to person, place, and time.   Psychiatric:         Mood and Affect: Mood normal.         Behavior: Behavior normal.          Last Recorded Vitals  /73 (BP Location: Left arm, Patient Position: Sitting, BP Cuff Size: Large adult)   Pulse 80   Temp 36 °C (96.8 °F) (Temporal)   Resp 14   Wt 113 kg (248 lb 8 oz)   SpO2 95%  Body mass index is 34.66 kg/m².       Pain Management Panel  More data exists         Latest Ref Rng & Units 6/10/2024 8/8/2023   Pain Management Panel   Amphetamine Screen, Urine Presumptive Negative Presumptive Negative  PRESUMPTIVE NEGATIVE    Barbiturate Screen, Urine Presumptive Negative Presumptive Negative  PRESUMPTIVE NEGATIVE    Codeine IgE <50 ng/mL <50  -   Fentanyl Screen, Urine NEGATIVE - PRESUMPTIVE NEGATIVE    Hydromorphone Urine <25 ng/mL <25  -   Methadone Screen, Urine NEGATIVE - PRESUMPTIVE NEGATIVE    Morphine  <50 ng/mL <50  -      Details                      Relevant Results    Narrative & Impression   MRN: [id number]  Patient Name: MARLYS LIND     STUDY:  MRI L-SPINE WO;  10/15/2022 12:13 pm     INDICATION:  Chronic lower back pain with right-sided leg pain.  No known injury  M54.17: Lumbosacral neuritis.     COMPARISON:  March 2014.     ACCESSION  NUMBER(S):  [id number]     ORDERING CLINICIAN:  HEATH LAGOS     TECHNIQUE:  The lumbar spine was studied in the sagital, axial and coronal planes  utiliing T1 and T2 weighted images.     FINDINGS:  The marrow signal and vertebral body height are normal. The conus and  sacrum are normal.  Images at each interspace reveal the following:  L1/L2  Mild facet hypertrophy without canal or foraminal narrowing  L2/L3  Mild facet hypertrophy without canal or foraminal narrowing  L3/L4  Slight loss of height and signal at the intervertebral disc space.  Minimal bulging intervertebral disc and facet hypertrophy without  canal stenosis. Mild bilateral foraminal narrowing  L4/L5  Circumferential bulging intervertebral disc and bilateral facet  hypertrophy. No measurable canal stenosis. Mild bilateral foraminal  narrowing.  L5/S1  Bilateral facet hypertrophy without canal stenosis. Moderate/advanced  bilateral foraminal narrowing.        IMPRESSION:  * Lumbar spondylosis, as described above, is not measurably changed  compared to the previous exam.      --------------       Media Information           Assessment/Plan   Problem List Items Addressed This Visit             ICD-10-CM    Facet arthropathy, lumbosacral M47.817    Relevant Medications    gabapentin (Neurontin) 300 mg capsule    Other Relevant Orders    PT eval and treat    Sacroiliitis (CMS-HCC) - Primary M46.1    Relevant Orders    PT eval and treat    [place] pain management    Sacroiliac Joint Injection              1. Sacroiliitis (CMS-HCC)  - PT eval and treat  - [place] pain management; Future  - Sacroiliac Joint Injection; Future    2. Facet arthropathy, lumbosacral  - gabapentin (Neurontin) 300 mg capsule; Take 1 capsule (300 mg) by mouth 2 times a day.  Dispense: 60 capsule; Refill: 0  - PT eval and treat       Plan/Follow-up Instructions:   I increase your gabapentin.  Start taking 300 mg twice a day if tolerated.    Continue with your prescribed medications.    I ordered  physical therapy and you may have it done anytime    ---------------    Your next appointment is with Dr. Benoit in:    [facility]    For pain block/injection on: 9/16/2024, right sacroiliac intra-articular joint injection    LOCAL    No aspirin on this day    °You will receive a phone call the weekday before your appointment/procedure.   °Please KEEP your scheduled appointments.     °BRING your photo ID, insurance information, and a correct list of your home medications to EVERY visit.    °Please call our office at [phone] if you have any questions.     You will then be seen for a postprocedure follow-up in 6 to 8 weeks  ---------------        Time     Prep time on date of the patient encounter: 2  Time spent directly with patient/family/caregiver: 30   Documentation time: 2  Total time on date of patient encounter: 34      BRITTANY Platt-CNP, COLIN Uriarte DNP, BRITTANY, FLOYDP-C    Formerly [facility], [facility]  Office #: [phone]  Fax # [phone]    ---------------------  Disclaimer: This note was created using voice recognition software. It was not corrected for typographical or grammatical errors, inadvertent word insertion, or any unintended errors. Please feel free to contact me for clarification.  -----------------

## 2024-08-21 NOTE — PROGRESS NOTES
Last urine drug screening date/ordered today: 06/10/2024     Results of last screen: As expected      Last opioid risk screening date/ordered today: 06/10/2024      Pain Scale Screening:   Pain Assessment and Documentation Tool (PADT)   Date of Assessment: 08/21/2024  Analgesia:   Patient reports her pain level on average during the past week is 10on a 0 - 10 scale.   Patient reports that her pain level at its worst during the past week was 10 on a 0 -10 scale.   50 % of pain has been relieved during the past week per patient   Patient states that the amount of pain relief she is now obtaining from her current pain reliever(s) is enough to make a real difference in her life.   Query to clinician: Is the patient's pain relief clinically significant? yes  Activities of Daily Living:   Physical functioning: worse  Family relationships: worse  Social relationships: worse  Mood: unchanged  Sleep patterns: worse  Overall functioning: worse  Adverse Events: No, Vincent Ramirez is not experiencing side effects from current pain reliever.  Patients overall severity of side effect:none  Specific Analgesic Plan: Continue present regimen.

## 2024-08-21 NOTE — PROGRESS NOTES
"History Of Present Illness  Vincent Ramirez \"Car\" is a pleasant 74 y.o. male who is here for postprocedure follow-up.  Patient is ambulatory.  Gait is steady.  He arrives with his spouse.    Patient had bilateral L4-L5, L5-S1 RFA on 6/24/2024.  The procedure has improved his back pain.  He reports it provided 90 to 100% relief.  He is still feeling better.  The procedure has improved his pain, sleep, ability to participate in his daily activities and ability to enjoy social activities.    Patient continues to have chronic lower back pain.  He reports pain is more at the right lower side affecting his right buttocks.  He has stinging, numbness and tingling sensation at the buttocks area.  He rates his pain as 10 out of 10.  Pain is constant.  He describes it as aching, sharp, shooting and tender kind of pain.  Pain to the right buttocks interferes with his activities.  He reports that it has worsened the past week that he is unable to walk without any pain.  He has been sedentary because of the pain.  Walking, standing and sitting aggravates the pain.  He denies pain, numbness or tingling sensation to his legs.  He denies leg weakness or change in balance.  He denies bowel or bladder incontinence.  He denies recent falls, injuries, or ER visits.  There has been no change since he was last seen.    Patient continues to take gabapentin.  It was ordered for 100 mg however he has been taking this at 300 mg due to the increasing pain.  He reports that medication did not alleviate the pain.  He continues to take Tylenol ibuprofen as needed.  He had physical therapy in the past with no relief.  He continues to do his home stretching exercise.    I reviewed previous notes and imaging.  I discussed the plan of care including pharmacologic and joint interventional procedure.  I discussed SI joint injection and he is in agreement to proceed to this procedure.  This is done under fluoroscopy.  Questions were answered during " this encounter.     The patient was counseled regarding diagnostic results, instructions for management, risk factor reductions, impressions, risks and benefits of treatment options and importance of compliance with treatment.    --------------  PROCEDURES:    6/24/2024: Bilateral L4-L5, L5-S1 RFA  3/10/2023: Left shoulder injection  1/24/2023: Bilateral L4-L5, L5-S1 RFA  12/20/2022: Bilateral L4-L5, L5-S1 diagnostic MBB #2  10/10/2022: Bilateral L4-L5, L5-S1 diagnostic MBB #1  -------------    OARRS:  Carlene Uriarte, APRN-CNP, DNP on 8/21/2024 10:09 AM  I have personally reviewed the OARRS report for Vincent ESTRELLA James. I have considered the risks of abuse, dependence, addiction and diversion    Past Medical History  He has a past medical history of Acute sinusitis, unspecified (12/26/2023), Cough (12/26/2023), Diabetes (Multi), and Personal history of non-Hodgkin lymphomas (02/13/2014).    Surgical History  Past Surgical History:   Procedure Laterality Date    APPENDECTOMY  07/27/2021    Appendectomy    HAND SURGERY  02/13/2014    Hand Surgery                                                                                                                                                          HERNIA REPAIR  07/27/2021    Hernia Repair    OTHER SURGICAL HISTORY  02/13/2014    Nasal Endoscopy Polypectomy    OTHER SURGICAL HISTORY  07/27/2021    Throat Surgery    TONSILLECTOMY  07/27/2021    Tonsillectomy        Social History  He reports that he has never smoked. He has never used smokeless tobacco. He reports that he does not currently use alcohol. He reports that he does not use drugs.    Family History  Family History   Problem Relation Name Age of Onset    No Known Problems Other          Allergies  Penicillins    Medications    Current Outpatient Medications:     alfuzosin (Uroxatral) 10 mg 24 hr tablet, Take 1 tablet (10 mg) by mouth once daily. Do not crush, chew, or split., Disp: 90 tablet, Rfl: 1    aspirin 81  mg EC tablet, Take 1 tablet (81 mg) by mouth once daily., Disp: , Rfl:     dicyclomine (Bentyl) 10 mg capsule, Take 1 capsule (10 mg) by mouth once daily as needed., Disp: , Rfl:     ergocalciferol (Vitamin D-2) 1.25 MG (33640 UT) capsule, Take 1 capsule (50,000 Units) by mouth 1 (one) time per week., Disp: 30 capsule, Rfl: 1    gabapentin (Neurontin) 100 mg capsule, Take 2 capsules (200 mg) by mouth once daily at bedtime., Disp: 180 capsule, Rfl: 3    oxybutynin (Ditropan) 5 mg tablet, Take 1 tablet (5 mg) by mouth 2 times a day., Disp: , Rfl:     Ozempic 2 mg/dose (8 mg/3 mL) pen injector, Inject 2 mg under the skin 1 (one) time per week., Disp: 3 mL, Rfl: 3    sertraline (Zoloft) 50 mg tablet, Take 1 tablet (50 mg) by mouth once daily., Disp: 90 tablet, Rfl: 1    traZODone (Desyrel) 100 mg tablet, Take 1 tablet (100 mg) by mouth once daily at bedtime., Disp: 90 tablet, Rfl: 1    gabapentin (Neurontin) 300 mg capsule, Take 1 capsule (300 mg) by mouth 2 times a day., Disp: 60 capsule, Rfl: 0     Review of Systems   Constitutional: Negative.    HENT: Negative.     Eyes: Negative.    Respiratory: Negative.     Cardiovascular: Negative.    Gastrointestinal: Negative.    Endocrine: Negative.    Genitourinary: Negative.    Musculoskeletal:  Positive for arthralgias, back pain and myalgias. Negative for gait problem, joint swelling, neck pain and neck stiffness.   Skin: Negative.    Allergic/Immunologic: Negative.    Neurological: Negative.    Psychiatric/Behavioral: Negative.          Physical Exam  Vitals and nursing note reviewed.   HENT:      Head: Normocephalic.      Nose: Nose normal.   Eyes:      Extraocular Movements: Extraocular movements intact.      Conjunctiva/sclera: Conjunctivae normal.      Pupils: Pupils are equal, round, and reactive to light.   Cardiovascular:      Rate and Rhythm: Normal rate and regular rhythm.   Pulmonary:      Effort: Pulmonary effort is normal.      Breath sounds: Normal breath  sounds.   Musculoskeletal:         General: Tenderness present. No swelling, deformity or signs of injury.      Cervical back: No rigidity or tenderness.      Lumbar back: Tenderness present.      Right lower leg: No edema.      Left lower leg: No edema.      Comments: Negative leg raise.  Negative for paraspinal tenderness at the lumbar region.  No radicular symptoms.  Positive for right SI joint pain on palpation.  Positive right Fabere's test eliciting back pain.  Positive for provocative test including Fabere's test, compression and distraction.  BUE 5/5, RLE 4/5, LLE 5/5.   Skin:     General: Skin is warm and dry.   Neurological:      General: No focal deficit present.      Mental Status: He is alert and oriented to person, place, and time.   Psychiatric:         Mood and Affect: Mood normal.         Behavior: Behavior normal.          Last Recorded Vitals  /73 (BP Location: Left arm, Patient Position: Sitting, BP Cuff Size: Large adult)   Pulse 80   Temp 36 °C (96.8 °F) (Temporal)   Resp 14   Wt 113 kg (248 lb 8 oz)   SpO2 95%  Body mass index is 34.66 kg/m².       Pain Management Panel  More data exists         Latest Ref Rng & Units 6/10/2024 8/8/2023   Pain Management Panel   Amphetamine Screen, Urine Presumptive Negative Presumptive Negative  PRESUMPTIVE NEGATIVE    Barbiturate Screen, Urine Presumptive Negative Presumptive Negative  PRESUMPTIVE NEGATIVE    Codeine IgE <50 ng/mL <50  -   Fentanyl Screen, Urine NEGATIVE - PRESUMPTIVE NEGATIVE    Hydromorphone Urine <25 ng/mL <25  -   Methadone Screen, Urine NEGATIVE - PRESUMPTIVE NEGATIVE    Morphine  <50 ng/mL <50  -      Details                      Relevant Results    Narrative & Impression   MRN: [id number]  Patient Name: MARLYS LIND     STUDY:  MRI L-SPINE WO;  10/15/2022 12:13 pm     INDICATION:  Chronic lower back pain with right-sided leg pain.  No known injury  M54.17: Lumbosacral neuritis.     COMPARISON:  March 2014.     ACCESSION  NUMBER(S):  [id number]     ORDERING CLINICIAN:  HEATH LAGOS     TECHNIQUE:  The lumbar spine was studied in the sagital, axial and coronal planes  utiliing T1 and T2 weighted images.     FINDINGS:  The marrow signal and vertebral body height are normal. The conus and  sacrum are normal.  Images at each interspace reveal the following:  L1/L2  Mild facet hypertrophy without canal or foraminal narrowing  L2/L3  Mild facet hypertrophy without canal or foraminal narrowing  L3/L4  Slight loss of height and signal at the intervertebral disc space.  Minimal bulging intervertebral disc and facet hypertrophy without  canal stenosis. Mild bilateral foraminal narrowing  L4/L5  Circumferential bulging intervertebral disc and bilateral facet  hypertrophy. No measurable canal stenosis. Mild bilateral foraminal  narrowing.  L5/S1  Bilateral facet hypertrophy without canal stenosis. Moderate/advanced  bilateral foraminal narrowing.        IMPRESSION:  * Lumbar spondylosis, as described above, is not measurably changed  compared to the previous exam.      --------------       Media Information           Assessment/Plan   Problem List Items Addressed This Visit             ICD-10-CM    Facet arthropathy, lumbosacral M47.817    Relevant Medications    gabapentin (Neurontin) 300 mg capsule    Other Relevant Orders    PT eval and treat    Sacroiliitis (CMS-HCC) - Primary M46.1    Relevant Orders    PT eval and treat    [place] pain management    Sacroiliac Joint Injection              1. Sacroiliitis (CMS-HCC)  - PT eval and treat  - [place] pain management; Future  - Sacroiliac Joint Injection; Future    2. Facet arthropathy, lumbosacral  - gabapentin (Neurontin) 300 mg capsule; Take 1 capsule (300 mg) by mouth 2 times a day.  Dispense: 60 capsule; Refill: 0  - PT eval and treat       Plan/Follow-up Instructions:   I increase your gabapentin.  Start taking 300 mg twice a day if tolerated.    Continue with your prescribed medications.    I ordered  physical therapy and you may have it done anytime    ---------------    Your next appointment is with Dr. Benoit in:    [facility]    For pain block/injection on: 9/16/2024, right sacroiliac intra-articular joint injection    LOCAL    No aspirin on this day    °You will receive a phone call the weekday before your appointment/procedure.   °Please KEEP your scheduled appointments.     °BRING your photo ID, insurance information, and a correct list of your home medications to EVERY visit.    °Please call our office at [phone] if you have any questions.     You will then be seen for a postprocedure follow-up in 6 to 8 weeks  ---------------        Time     Prep time on date of the patient encounter: 2  Time spent directly with patient/family/caregiver: 30   Documentation time: 2  Total time on date of patient encounter: 34      BRITTANY Platt-CNP, COLIN Uriarte DNP, BRITTANY, FLOYDP-C    [facility]  Office #: [phone]  Fax # [phone]    ---------------------  Disclaimer: This note was created using voice recognition software. It was not corrected for typographical or grammatical errors, inadvertent word insertion, or any unintended errors. Please feel free to contact me for clarification.  -----------------

## 2024-08-30 ENCOUNTER — HOSPITAL ENCOUNTER (OUTPATIENT)
Dept: RADIOLOGY | Facility: HOSPITAL | Age: 74
Discharge: HOME | End: 2024-08-30
Payer: MEDICARE

## 2024-08-30 DIAGNOSIS — C61 MALIGNANT NEOPLASM OF PROSTATE (MULTI): ICD-10-CM

## 2024-08-30 PROCEDURE — 78306 BONE IMAGING WHOLE BODY: CPT

## 2024-08-30 PROCEDURE — 3430000001 HC RX 343 DIAGNOSTIC RADIOPHARMACEUTICALS: Performed by: UROLOGY

## 2024-08-30 PROCEDURE — A9503 TC99M MEDRONATE: HCPCS | Performed by: UROLOGY

## 2024-09-03 ENCOUNTER — CLINICAL SUPPORT (OUTPATIENT)
Dept: PRIMARY CARE | Facility: CLINIC | Age: 74
End: 2024-09-03
Payer: MEDICARE

## 2024-09-03 DIAGNOSIS — Z23 FLU VACCINE NEED: ICD-10-CM

## 2024-09-03 PROCEDURE — G0008 ADMIN INFLUENZA VIRUS VAC: HCPCS

## 2024-09-03 PROCEDURE — 90662 IIV NO PRSV INCREASED AG IM: CPT

## 2024-09-05 ENCOUNTER — OFFICE VISIT (OUTPATIENT)
Dept: PRIMARY CARE | Facility: CLINIC | Age: 74
End: 2024-09-05
Payer: MEDICARE

## 2024-09-05 VITALS
BODY MASS INDEX: 34.86 KG/M2 | WEIGHT: 249 LBS | HEIGHT: 71 IN | RESPIRATION RATE: 18 BRPM | SYSTOLIC BLOOD PRESSURE: 103 MMHG | HEART RATE: 90 BPM | OXYGEN SATURATION: 97 % | TEMPERATURE: 96.9 F | DIASTOLIC BLOOD PRESSURE: 69 MMHG

## 2024-09-05 DIAGNOSIS — N40.0 BENIGN PROSTATIC HYPERPLASIA, UNSPECIFIED WHETHER LOWER URINARY TRACT SYMPTOMS PRESENT: ICD-10-CM

## 2024-09-05 DIAGNOSIS — R42 POSTURAL DIZZINESS WITH PRESYNCOPE: Primary | ICD-10-CM

## 2024-09-05 DIAGNOSIS — E11.9 TYPE 2 DIABETES MELLITUS WITHOUT COMPLICATION, WITHOUT LONG-TERM CURRENT USE OF INSULIN (MULTI): ICD-10-CM

## 2024-09-05 DIAGNOSIS — R55 POSTURAL DIZZINESS WITH PRESYNCOPE: Primary | ICD-10-CM

## 2024-09-05 PROCEDURE — 3044F HG A1C LEVEL LT 7.0%: CPT

## 2024-09-05 PROCEDURE — 1158F ADVNC CARE PLAN TLK DOCD: CPT

## 2024-09-05 PROCEDURE — 3074F SYST BP LT 130 MM HG: CPT

## 2024-09-05 PROCEDURE — 1157F ADVNC CARE PLAN IN RCRD: CPT

## 2024-09-05 PROCEDURE — 1125F AMNT PAIN NOTED PAIN PRSNT: CPT

## 2024-09-05 PROCEDURE — 3078F DIAST BP <80 MM HG: CPT

## 2024-09-05 PROCEDURE — 3008F BODY MASS INDEX DOCD: CPT

## 2024-09-05 PROCEDURE — 1159F MED LIST DOCD IN RCRD: CPT

## 2024-09-05 PROCEDURE — 1036F TOBACCO NON-USER: CPT

## 2024-09-05 PROCEDURE — 3060F POS MICROALBUMINURIA REV: CPT

## 2024-09-05 PROCEDURE — 3048F LDL-C <100 MG/DL: CPT

## 2024-09-05 PROCEDURE — 99214 OFFICE O/P EST MOD 30 MIN: CPT

## 2024-09-05 PROCEDURE — 1123F ACP DISCUSS/DSCN MKR DOCD: CPT

## 2024-09-05 RX ORDER — TIRZEPATIDE 2.5 MG/.5ML
INJECTION, SOLUTION SUBCUTANEOUS
Qty: 10 PEN | Refills: 1 | Status: SHIPPED | OUTPATIENT
Start: 2024-09-05 | End: 2024-10-19

## 2024-09-05 RX ORDER — OXYBUTYNIN CHLORIDE 5 MG/1
5 TABLET ORAL 2 TIMES DAILY
Qty: 180 TABLET | Refills: 0 | Status: SHIPPED | OUTPATIENT
Start: 2024-09-05

## 2024-09-05 ASSESSMENT — ENCOUNTER SYMPTOMS
COUGH: 0
WEAKNESS: 0
ALLERGIC/IMMUNOLOGIC NEGATIVE: 1
LIGHT-HEADEDNESS: 0
SHORTNESS OF BREATH: 0
PALPITATIONS: 0
PSYCHIATRIC NEGATIVE: 1
DIZZINESS: 0
HEADACHES: 0
SPEECH DIFFICULTY: 0
CONSTITUTIONAL NEGATIVE: 1
BACK PAIN: 1
GASTROINTESTINAL NEGATIVE: 1
FACIAL ASYMMETRY: 0
HEMATOLOGIC/LYMPHATIC NEGATIVE: 1
EYES NEGATIVE: 1
TREMORS: 0
NUMBNESS: 0
CHEST TIGHTNESS: 0
SEIZURES: 0
ENDOCRINE NEGATIVE: 1

## 2024-09-05 ASSESSMENT — PAIN SCALES - GENERAL: PAINLEVEL: 8

## 2024-09-05 ASSESSMENT — COLUMBIA-SUICIDE SEVERITY RATING SCALE - C-SSRS
1. IN THE PAST MONTH, HAVE YOU WISHED YOU WERE DEAD OR WISHED YOU COULD GO TO SLEEP AND NOT WAKE UP?: NO
2. HAVE YOU ACTUALLY HAD ANY THOUGHTS OF KILLING YOURSELF?: NO
6. HAVE YOU EVER DONE ANYTHING, STARTED TO DO ANYTHING, OR PREPARED TO DO ANYTHING TO END YOUR LIFE?: NO

## 2024-09-05 NOTE — PROGRESS NOTES
Subjective   Patient ID: Car Ramirez is a 74 y.o. male who presents for Patient states that he has had imbalance issues for years. States that it started when he had chemo treatments. States that he will get dizzy throughout the day and fell unsteady. .   Patient states that he has had imbalance issues for years. States that it started when he had chemo treatments. States that he will get dizzy throughout the day and fell unsteady.       HPI     Patient is here today for concerns of losing his balance. He states this issue has been going on for years but his wife believes it is getting worse. He has noticed it more since he recently bought an electric bike and feels like he is very unsteady on it. He states he loses his balance multiple times a day. It can happen while standing or also with position changes. He denies dizziness, lightheadedness, or feeling like the room is spinning. He reports that he has fallen over multiple times but never loses consciousness or feels like he is going to pass out. Does not believe episodes correlate with bowel movement or coughing. He denies any tingling, numbness, or headaches. He denies any chest pain, palpitations, or SOB. Reports occasional blurred vision with small print but states his recent eye exam was normal.    Of note, his nuclear stress test in 2021 showed reduced EF of 49 % and his EKG showed sinus rhythm with occasional premature ventricular complexes and a LBBB.    Review of Systems   Constitutional: Negative.    HENT: Negative.     Eyes: Negative.    Respiratory:  Negative for cough, chest tightness and shortness of breath.    Cardiovascular:  Negative for chest pain, palpitations and leg swelling.   Gastrointestinal: Negative.    Endocrine: Negative.    Musculoskeletal:  Positive for back pain.   Skin: Negative.    Allergic/Immunologic: Negative.    Neurological:  Negative for dizziness, tremors, seizures, syncope, facial asymmetry, speech difficulty, weakness,  "light-headedness, numbness and headaches.   Hematological: Negative.    Psychiatric/Behavioral: Negative.       Objective   /69 (BP Location: Right arm)   Pulse 90   Temp 36.1 °C (96.9 °F)   Resp 18   Ht 1.803 m (5' 11\")   Wt 113 kg (249 lb)   SpO2 97%   BMI 34.73 kg/m²     Physical Exam  Constitutional:       Appearance: Normal appearance.   HENT:      Head: Normocephalic and atraumatic.      Nose: Nose normal.   Eyes:      Extraocular Movements: Extraocular movements intact.      Pupils: Pupils are equal, round, and reactive to light.      Comments: Bilateral nystagmus observed   Cardiovascular:      Rate and Rhythm: Normal rate and regular rhythm.      Pulses: Normal pulses.      Heart sounds: Normal heart sounds.   Pulmonary:      Effort: Pulmonary effort is normal.      Breath sounds: Normal breath sounds.   Abdominal:      General: Bowel sounds are normal.   Musculoskeletal:      Cervical back: Normal range of motion.   Skin:     General: Skin is warm.      Capillary Refill: Capillary refill takes less than 2 seconds.   Neurological:      Mental Status: He is alert and oriented to person, place, and time.      Cranial Nerves: No cranial nerve deficit.      Sensory: No sensory deficit.      Motor: No weakness.      Coordination: Coordination abnormal.      Gait: Gait normal.      Comments: Romberg test positive  Negative for visual field cut  Negative for ataxia   Psychiatric:         Mood and Affect: Mood normal.         Behavior: Behavior normal.         Thought Content: Thought content normal.         Judgment: Judgment normal.       Orders Placed This Encounter   Procedures    ECG 12 lead (Ancillary Performed)     Standing Status:   Future     Standing Expiration Date:   9/5/2025     Order Specific Question:   Reason for Exam:     Answer:   presyncope    Transthoracic Echo (TTE) Complete     Standing Status:   Future     Standing Expiration Date:   9/5/2026     Order Specific Question:   Reason " for exam:     Answer:   presyncopal episodes, reduced EF in 2021     Order Specific Question:   Possible 3D echo?     Answer:   No     Order Specific Question:   Possible strain echo?     Answer:   No     Order Specific Question:   Where is your preferred location to perform this study?     Answer:   First Available      Assessment/Plan     Balance concern postural dizziness with preyncope  - Epley maneuver performed and education provided on BPPV  - EKG & echocardiogram ordered    2. T2DM  - HgbA1c from 7/22/24 was 5.5 %  - Stop taking Ozempic  - Start taking Mounjaro 2.5 mg weekly for 2 weeks, then 5 mg weekly    3. BPH  -oxybutin    JAMAR Guzman Student  [facility]    I have reviewed and agree with the above plan and assessment  BRITTANY Sousa-JAMAR-C

## 2024-09-05 NOTE — PROGRESS NOTES
Patient states that he has had imbalance issues for years. States that it started when he had chemo treatments. States that he will get dizzy throughout the day and fell unsteady.

## 2024-09-05 NOTE — PATIENT INSTRUCTIONS
"What is positional vertigo?   Vertigo is a type of dizziness. One common type is called \"benign paroxysmal positional vertigo,\" or \"BPPV.\" It is sometimes just called \"positional vertigo.\" Vertigo can make you feel like you are spinning, swaying, or tilting, or like the room is moving around you. In BPPV, these symptoms can be caused by changing the position of your head. The symptoms might only last a few minutes, but they can happen again and again.  BPPV is different than feeling dizzy because you stood up too fast. It is often triggered by rolling over in bed or looking up.  BPPV is caused by inner ear problems. Deep inside the ear, there is a small network of tubes filled with fluid. Special calcium deposits float inside that fluid. Together, these tubes and deposits make up the \"vestibular system\" (figure 1). This system tells the brain what position the body is in. It also helps keep you balanced.  With BPPV, extra calcium deposits form in the inner ear. This can lead to short episodes of vertigo when you move your head in certain ways.  How can exercises help with BPPV?   Certain exercises or \"maneuvers\" can help move the extra calcium to a part of the inner ear where it will be reabsorbed. Some people feel better right away after these exercises. Others feel better within a few days. You might need to repeat the exercises a few times to help your symptoms. The exercises can also make symptoms like nausea worse while you do them.  It can help to know how to do these exercises at home if your symptoms return. Talk to your doctor or nurse before trying to do these on your own. They can help you figure out which exercises are best for you. If you try to do these without talking to your doctor, you might make your symptoms worse.  What exercises should I do?   Your doctor might show you how to do these exercises or have you work with a physical therapist. They will teach you how often and how long to do the " exercises.  ?Modified Epley maneuver - This is mostly used if you know which ear has the calcium deposits causing your vertigo.  Hold each position before moving to the next position.  If your right ear has a problem (figure 2):  Put a pillow toward the end of your bed. Sit far enough away from the pillow that it will be under your shoulders, but not your head, when you lie back.  Sit on the bed with your legs extended in front of you. Leave room to the side of you so you can roll to your left without falling off of the bed.  Tilt your head back slightly, and turn your head 45 degrees to the right. This is [facility] between looking straight forward and looking over your right shoulder. Hold this position for 30 seconds.  Lie back quickly with your head in the same position, and hold this position for 30 seconds.  While on your back, roll your head to the left. Your head should now be tilted slightly back and looking 45 degrees to the left. This is looking [facility] between straight ahead and over your left shoulder. Hold this position for 30 seconds.  Now, hold your head in the same position, and roll onto your left shoulder so you are lying almost face down with your left arm under your body. Your head should still be looking [facility] between straight ahead and over your left shoulder. Hold this position for 30 seconds.  Sit up on the side of the bed.  Ask your doctor or therapist about when and how often to repeat the exercise.  If your left ear has a problem (figure 3):  Put a pillow toward the end of your bed. Sit far enough away from the pillow that it will be under your shoulders, but not under your head, when you lie back.  Sit on the bed with your legs extended in front of you. Leave room to the side of you so that you can roll to your right without falling off of the bed.  Tilt your head back slightly, and turn your head 45 degrees to the left. This is [facility] between looking straight forward and looking over  your left shoulder. Hold this position for 30 seconds.  Lie back quickly with your head in the same position, and hold this position for 30 seconds.  While on your back, roll your head to the right. Your head should now be tilted slightly back and looking 45 degrees to the right. This is looking [facility] between straight ahead and over your right shoulder. Hold this position for 30 seconds.  Now, hold your head in the same position, and roll onto your right shoulder so you are lying almost face down with your right arm under your body. Your head should still be looking [facility] between straight ahead and over your right shoulder. Hold this position for 30 seconds.  Sit up on the side of the bed.  Ask your doctor or therapist for instructions about when and how often to repeat the exercise.  ?[place]ont maneuver - This is mostly used if you know which ear has the calcium deposits causing your vertigo.  If your right ear has a problem (figure 4):  Sit on the edge of the bed, with room on both sides of you. Turn your head 45 degrees to the left. This is looking [facility] between straight ahead and over your left shoulder.  Quickly lie down on your right side with your head still turned to the left so you are looking up at the ceiling. Hold this position for 30 seconds or until any dizziness goes away.  Quickly sit up and lie down on your left side without stopping in the middle to sit upright. Keep your head turned 45 degrees to the left while changing positions. When you are on your left side, your head will face partly toward the bed or floor. Hold this position for 30 seconds or until any dizziness goes away. Then, sit upright.  Ask your doctor or therapist for instructions about when and how often to repeat the exercise.  If your left ear has a problem (figure 5):  Sit on the edge of the bed, with room on both sides of you. Turn your head 45 degrees to the right. This is looking [facility] between straight ahead and over your  right shoulder.  Quickly lie down on your left side with your head still turned to the right so you are looking up at the ceiling. Hold this position for 30 seconds or until any dizziness goes away.  Quickly sit up and lie down on your right side without stopping in the middle to sit upright. Keep your head turned to the right while changing positions. When you are lying on your right side, your head will face partly toward the bed or floor. Hold this position for 30 seconds or until any dizziness goes away. Then, sit upright.  Ask your doctor or therapist for instructions about when and how often to repeat the exercise.  ?C[place]-Dar[place] exercises - This is mostly used if you are not sure which ear has the calcium deposits causing your vertigo.  For this exercise (figure 6):  Sit on the edge of the bed in the middle of 1 side.  Lie down quickly on your left side with your head turned 45 degrees to the right. This is looking care home between straight ahead and over your right shoulder. Hold this position for 30 seconds or until any dizziness goes away.  Sit up, and face forward. Wait for any dizziness to go away.  Lie down quickly on your right side with your head turned 45 degrees to the left. This is looking care home between straight ahead and over your left shoulder. Hold this position for 30 seconds or until any dizziness goes away.  Sit up, and face forward. Wait for any dizziness to go away.  Ask your doctor or therapist for instructions about when and how often to repeat the exercise. Often, they will want you to do this exercise 4 to 6 times, several times each day.  What else should I know?   Some of these exercises are used if you have a problem with only 1 ear. If this is the case, do not do the exercise on the other side. This will not help and can even make the problem worse. Your doctor will tell you if you should do the exercises for both ears or only 1 ear.  Make sure that you understand exactly what you  need to do. Ask questions if there is anything you do not understand.

## 2024-09-12 ENCOUNTER — HOSPITAL ENCOUNTER (OUTPATIENT)
Dept: CARDIOLOGY | Facility: HOSPITAL | Age: 74
Discharge: HOME | End: 2024-09-12
Payer: MEDICARE

## 2024-09-12 VITALS — DIASTOLIC BLOOD PRESSURE: 75 MMHG | SYSTOLIC BLOOD PRESSURE: 127 MMHG

## 2024-09-12 DIAGNOSIS — R42 POSTURAL DIZZINESS WITH PRESYNCOPE: ICD-10-CM

## 2024-09-12 DIAGNOSIS — N40.0 BENIGN PROSTATIC HYPERPLASIA, UNSPECIFIED WHETHER LOWER URINARY TRACT SYMPTOMS PRESENT: ICD-10-CM

## 2024-09-12 DIAGNOSIS — R55 POSTURAL DIZZINESS WITH PRESYNCOPE: ICD-10-CM

## 2024-09-12 PROCEDURE — 93005 ELECTROCARDIOGRAM TRACING: CPT

## 2024-09-12 PROCEDURE — 93306 TTE W/DOPPLER COMPLETE: CPT

## 2024-09-12 PROCEDURE — 93306 TTE W/DOPPLER COMPLETE: CPT | Performed by: INTERNAL MEDICINE

## 2024-09-12 PROCEDURE — 93010 ELECTROCARDIOGRAM REPORT: CPT | Performed by: INTERNAL MEDICINE

## 2024-09-13 LAB
AORTIC VALVE PEAK VELOCITY: 1.04 M/S
ATRIAL RATE: 72 BPM
AV PEAK GRADIENT: 4.3 MMHG
AVA (PEAK VEL): 3.56 CM2
EJECTION FRACTION APICAL 4 CHAMBER: 58.9
EJECTION FRACTION: 63 %
LEFT ATRIUM VOLUME AREA LENGTH INDEX BSA: 21.2 ML/M2
LEFT VENTRICLE INTERNAL DIMENSION DIASTOLE: 4.92 CM (ref 3.5–6)
LEFT VENTRICULAR OUTFLOW TRACT DIAMETER: 2.17 CM
MITRAL VALVE E/A RATIO: 0.9
P AXIS: 63 DEGREES
P OFFSET: 172 MS
P ONSET: 113 MS
PR INTERVAL: 190 MS
Q ONSET: 208 MS
QRS COUNT: 12 BEATS
QRS DURATION: 142 MS
QT INTERVAL: 432 MS
QTC CALCULATION(BAZETT): 473 MS
QTC FREDERICIA: 459 MS
R AXIS: 55 DEGREES
RIGHT VENTRICLE FREE WALL PEAK S': 13 CM/S
T AXIS: -61 DEGREES
T OFFSET: 424 MS
TRICUSPID ANNULAR PLANE SYSTOLIC EXCURSION: 2.2 CM
VENTRICULAR RATE: 72 BPM

## 2024-09-16 ENCOUNTER — HOSPITAL ENCOUNTER (OUTPATIENT)
Dept: PAIN MEDICINE | Facility: HOSPITAL | Age: 74
Discharge: HOME | End: 2024-09-16
Payer: MEDICARE

## 2024-09-16 VITALS
HEART RATE: 76 BPM | OXYGEN SATURATION: 95 % | WEIGHT: 250 LBS | HEIGHT: 71 IN | DIASTOLIC BLOOD PRESSURE: 84 MMHG | BODY MASS INDEX: 35 KG/M2 | RESPIRATION RATE: 16 BRPM | TEMPERATURE: 97.8 F | SYSTOLIC BLOOD PRESSURE: 123 MMHG

## 2024-09-16 DIAGNOSIS — M46.1 SACROILIITIS (CMS-HCC): ICD-10-CM

## 2024-09-16 PROCEDURE — 2550000001 HC RX 255 CONTRASTS: Performed by: ANESTHESIOLOGY

## 2024-09-16 PROCEDURE — 27096 INJECT SACROILIAC JOINT: CPT | Performed by: ANESTHESIOLOGY

## 2024-09-16 PROCEDURE — 2500000004 HC RX 250 GENERAL PHARMACY W/ HCPCS (ALT 636 FOR OP/ED): Performed by: ANESTHESIOLOGY

## 2024-09-16 RX ORDER — BUPIVACAINE HYDROCHLORIDE 2.5 MG/ML
INJECTION, SOLUTION EPIDURAL; INFILTRATION; INTRACAUDAL AS NEEDED
Status: COMPLETED | OUTPATIENT
Start: 2024-09-16 | End: 2024-09-16

## 2024-09-16 RX ORDER — TRIAMCINOLONE ACETONIDE 40 MG/ML
INJECTION, SUSPENSION INTRA-ARTICULAR; INTRAMUSCULAR AS NEEDED
Status: COMPLETED | OUTPATIENT
Start: 2024-09-16 | End: 2024-09-16

## 2024-09-16 ASSESSMENT — ENCOUNTER SYMPTOMS
OCCASIONAL FEELINGS OF UNSTEADINESS: 1
LOSS OF SENSATION IN FEET: 0
DEPRESSION: 0

## 2024-09-16 ASSESSMENT — PAIN - FUNCTIONAL ASSESSMENT: PAIN_FUNCTIONAL_ASSESSMENT: 0-10

## 2024-09-16 ASSESSMENT — PAIN SCALES - GENERAL: PAINLEVEL_OUTOF10: 8

## 2024-09-16 NOTE — DISCHARGE INSTRUCTIONS
No heavy lifting or strenuous activity today.    Keep bandage on for 24 hours.  Keep injection site clean and dry, it may be sore for up to 48 hours.  For relief of pain and swelling, you may apply ice to the injection site area.  If pain persist you may apply moist heat.  Common side effects of steroids include insomnia, facial flushing, increased appetite, headaches, sweating, fluid retention. If you have diabetes your blood sugar may increase. Please monitor your diet and your blood sugar should return to normal in a few days. If your sugar becomes dangerously high, please contact your physician that manages your diabetes for further guidance.  Rare side effects include infection, bleeding, nerve damage. If you have fever, redness or swelling near site, severe pain, please go to the nearest emergency room. For other questions please call office at [phone]. Local anesthetics wear off in several hours and duration of relief vary from person to person.    Follow up 11/13 @ 9:40 am with Carlene Uriarte in [place]

## 2024-09-18 DIAGNOSIS — M47.817 FACET ARTHROPATHY, LUMBOSACRAL: ICD-10-CM

## 2024-10-22 ENCOUNTER — APPOINTMENT (OUTPATIENT)
Dept: PRIMARY CARE | Facility: CLINIC | Age: 74
End: 2024-10-22
Payer: MEDICARE

## 2024-10-22 VITALS
HEIGHT: 71 IN | HEART RATE: 73 BPM | DIASTOLIC BLOOD PRESSURE: 73 MMHG | WEIGHT: 250 LBS | BODY MASS INDEX: 35 KG/M2 | SYSTOLIC BLOOD PRESSURE: 112 MMHG | OXYGEN SATURATION: 94 %

## 2024-10-22 DIAGNOSIS — E11.9 TYPE 2 DIABETES MELLITUS WITHOUT COMPLICATION, WITHOUT LONG-TERM CURRENT USE OF INSULIN (MULTI): ICD-10-CM

## 2024-10-22 DIAGNOSIS — Z00.00 ROUTINE GENERAL MEDICAL EXAMINATION AT HEALTH CARE FACILITY: Primary | ICD-10-CM

## 2024-10-22 LAB — POC HEMOGLOBIN A1C: 5.5 % (ref 4.2–6.5)

## 2024-10-22 PROCEDURE — 3060F POS MICROALBUMINURIA REV: CPT

## 2024-10-22 PROCEDURE — 1158F ADVNC CARE PLAN TLK DOCD: CPT

## 2024-10-22 PROCEDURE — 3078F DIAST BP <80 MM HG: CPT

## 2024-10-22 PROCEDURE — 1036F TOBACCO NON-USER: CPT

## 2024-10-22 PROCEDURE — 3074F SYST BP LT 130 MM HG: CPT

## 2024-10-22 PROCEDURE — 3048F LDL-C <100 MG/DL: CPT

## 2024-10-22 PROCEDURE — 1160F RVW MEDS BY RX/DR IN RCRD: CPT

## 2024-10-22 PROCEDURE — G0439 PPPS, SUBSEQ VISIT: HCPCS

## 2024-10-22 PROCEDURE — 99213 OFFICE O/P EST LOW 20 MIN: CPT

## 2024-10-22 PROCEDURE — 3044F HG A1C LEVEL LT 7.0%: CPT

## 2024-10-22 PROCEDURE — 1157F ADVNC CARE PLAN IN RCRD: CPT

## 2024-10-22 PROCEDURE — 1125F AMNT PAIN NOTED PAIN PRSNT: CPT

## 2024-10-22 PROCEDURE — 1123F ACP DISCUSS/DSCN MKR DOCD: CPT

## 2024-10-22 PROCEDURE — 3008F BODY MASS INDEX DOCD: CPT

## 2024-10-22 PROCEDURE — 1170F FXNL STATUS ASSESSED: CPT

## 2024-10-22 PROCEDURE — 1159F MED LIST DOCD IN RCRD: CPT

## 2024-10-22 PROCEDURE — 83036 HEMOGLOBIN GLYCOSYLATED A1C: CPT

## 2024-10-22 ASSESSMENT — ACTIVITIES OF DAILY LIVING (ADL)
MANAGING_FINANCES: INDEPENDENT
TAKING_MEDICATION: INDEPENDENT
BATHING: INDEPENDENT
DRESSING: INDEPENDENT
GROCERY_SHOPPING: INDEPENDENT
DOING_HOUSEWORK: INDEPENDENT

## 2024-10-22 ASSESSMENT — PAIN SCALES - GENERAL: PAINLEVEL_OUTOF10: 5

## 2024-10-22 NOTE — PROGRESS NOTES
Vincent Ramirez is a 74 y.o. male who is presenting for annual Medicare wellness physical exam. And diabetes follow up    Subjective   Reason for Visit: Vincent Ramirez is an 74 y.o. male here for a Medicare Wellness visit.     Past Medical, Surgical, and Family History reviewed and updated in chart.    Reviewed all medications by prescribing practitioner or clinical pharmacist (such as prescriptions, OTCs, herbal therapies and supplements) and documented in the medical record.      HPI    DM  - well controlled  - A1c was 5.5  - current medication: Ozempic 2 mg weekly  - following diabetic diet    DDD Lumbar spine  - managed  - has had 10 injections over 2-3 years  - seeing a new spinal surgeon for second opinion    Annual Physical    Last  Visit: 9/5/24  Reported Health: Good    Dental, Vision, Hearing:  Regular dental visits: yes  - Brushes teeth 1 times per day  - Flosses? yes  Vision problems: yes  - Wears glasses or contacts? yes  - Last eye exam: last year  Hearing loss: no    Immunization status:  Up to date: yes    Lifestyle:  Healthy diet: yes  Regular exercise: yes  - Exercise frequency: walking and bike riding  - Type of exercise:  daily  Alcohol: yes rare on occasion  Tobacco: no  Drugs: no    Reproductive Health:  Sexually active: no  Erectile dysfunction: yes    Colorectal Cancer Screening:  Last  Cologuard: 7/22 normal  Prostate Cancer Screening:  Last PSA: 8/24   8.7  Metabolic Screening:  Lipid profile: 1/24  Glucose screen: 10/22/24 A1c 5.5    Patient Care Team:  BRITTANY Boothe-CNP as PCP - General (Family Medicine)    Review of Systems  Gen: denies fever, chills, weight loss, fatigue  HEENT: denies sinus pressure, sinus congestion, runny nose, red eyes, itchy eyes, vision loss, ear pain, hearing loss, throat pain, trouble swallowing  Neck: denies neck pain, neck swelling or masses  Chest/breast: denies breast pain, breast lumps, nipple discharge  CV: denies chest pain, palpitations,  fast heart rate, syncope  Resp: denies shortness of breath, cough, wheezing  GI: denies abdominal pain, nausea, diarrhea, constipation, hematochezia, melena  : denies dysuria, hematuria, vaginal/penile discharge, frequency  Endo: denies polydipsia, polyuria, heat/cold intolerance, weight change, hair thinning  Heme: denies easy bruising, easy bleeding  Neuro: denies headache, numbness, tingling, memory loss, changes in vision  MSK: denies joint pain, joint swelling, weakness, complaining of low back pain  Psych: denies suicidal ideation, homicidal ideation, depression, anxiety, mood swings  Skin: denies rashes, abnormal lesions, itching, changes in moles     Previous History  Past Medical History:   Diagnosis Date    Acute sinusitis, unspecified 12/26/2023    Cough 12/26/2023    Diabetes (Multi)     Personal history of non-Hodgkin lymphomas 02/13/2014    History of non-Hodgkin's lymphoma     Past Surgical History:   Procedure Laterality Date    APPENDECTOMY  07/27/2021    Appendectomy    HAND SURGERY  02/13/2014    Hand Surgery                                                                                                                                                          HERNIA REPAIR  07/27/2021    Hernia Repair    OTHER SURGICAL HISTORY  02/13/2014    Nasal Endoscopy Polypectomy    OTHER SURGICAL HISTORY  07/27/2021    Throat Surgery    TONSILLECTOMY  07/27/2021    Tonsillectomy     Social History     Tobacco Use    Smoking status: Never    Smokeless tobacco: Never   Vaping Use    Vaping status: Never Used   Substance Use Topics    Alcohol use: Not Currently    Drug use: Never     Family History   Problem Relation Name Age of Onset    No Known Problems Other       Allergies   Allergen Reactions    Penicillins Other and Hives     Current Outpatient Medications   Medication Instructions    alfuzosin (UROXATRAL) 10 mg, oral, Daily, Do not crush, chew, or split.    aspirin 81 mg EC tablet 1 tablet, Daily     "dicyclomine (BENTYL) 10 mg, Daily PRN    ergocalciferol (VITAMIN D-2) 50,000 Units, oral, Once Weekly    gabapentin (NEURONTIN) 300 mg, oral, 2 times daily    oxybutynin (DITROPAN) 5 mg, oral, 2 times daily    Ozempic 2 mg/dose (8 mg/3 mL) pen injector Inject 2 mg under the skin 1 (one) time per week.    sertraline (ZOLOFT) 50 mg, oral, Daily    traZODone (DESYREL) 100 mg, oral, Nightly     Objective  vitals  /73 (BP Location: Right arm)   Pulse 73   Ht 1.803 m (5' 11\")   Wt 113 kg (250 lb)   SpO2 94%   BMI 34.87 kg/m²     Physical Exam:  General: Alert and oriented, in no acute distress. Appears stated age, well-nourished, and well hydrated  HEENT:  - Head: Normocephalic and atraumatic   - Eyes: EOMI, PERRLA  - ENT: Hearing grossly intact. Mucus membranes pink and moist without lesions. Tonsils present without swelling or exudates. Good dentition. TMs gray  Neck: Supple. No stiffness. No thyromegaly or thyroid nodules  Heart: RRR. No murmurs, clicks, or rubs  Lungs: Unlabored breathing. CTAB with no crackles, wheezes, or rhonchi  Abdomen: Normal BS in all 4 quadrants. Soft, non-tender, non-distended, with no masses  Extremities: Warm and well perfused. No edema. Normal peripheral pulses  Musculoskeletal: ROM intact. Strength 5/5 in BUE and BLE. No joint swelling. Normal gait and station  Neurological: Alert and oriented. No gross neurological deficits. Normal sensation. No weakness. DTRs +2/4   Psychological: Appropriate mood and affect  Skin: No rash, abnormal lesions, cyanosis, or erythema      Assessment and Plan   DM  - stable   - continue Ozempic  - repeat A1c in 3-4 months    Low back pain  - stable  - going for second opinion from spine surgeon    Assessment & Plan  Type 2 diabetes mellitus without complication, without long-term current use of insulin (Multi)    Orders:    POCT glycosylated hemoglobin (Hb A1C) manually resulted    Routine general medical examination at [facility] " facility    Orders:    1 Year Follow Up In Primary Care - Wellness Exam; Future       It was great to see you today!  Please continue taking your prescribed medications.   Refill have been sent to your pharmacy.    RTC in 3-4 months

## 2024-10-25 ENCOUNTER — APPOINTMENT (OUTPATIENT)
Dept: ORTHOPEDIC SURGERY | Facility: CLINIC | Age: 74
End: 2024-10-25
Payer: MEDICARE

## 2024-10-25 VITALS — BODY MASS INDEX: 35 KG/M2 | WEIGHT: 250 LBS | HEIGHT: 71 IN

## 2024-10-25 DIAGNOSIS — M54.50 CHRONIC LOW BACK PAIN WITHOUT SCIATICA, UNSPECIFIED BACK PAIN LATERALITY: Primary | ICD-10-CM

## 2024-10-25 DIAGNOSIS — G89.29 CHRONIC LOW BACK PAIN WITHOUT SCIATICA, UNSPECIFIED BACK PAIN LATERALITY: Primary | ICD-10-CM

## 2024-10-25 DIAGNOSIS — M54.16 LUMBAR RADICULOPATHY: ICD-10-CM

## 2024-10-25 PROCEDURE — 1157F ADVNC CARE PLAN IN RCRD: CPT | Performed by: ORTHOPAEDIC SURGERY

## 2024-10-25 PROCEDURE — 1159F MED LIST DOCD IN RCRD: CPT | Performed by: ORTHOPAEDIC SURGERY

## 2024-10-25 PROCEDURE — 99203 OFFICE O/P NEW LOW 30 MIN: CPT | Performed by: ORTHOPAEDIC SURGERY

## 2024-10-25 PROCEDURE — 1123F ACP DISCUSS/DSCN MKR DOCD: CPT | Performed by: ORTHOPAEDIC SURGERY

## 2024-10-25 PROCEDURE — 3060F POS MICROALBUMINURIA REV: CPT | Performed by: ORTHOPAEDIC SURGERY

## 2024-10-25 PROCEDURE — 3008F BODY MASS INDEX DOCD: CPT | Performed by: ORTHOPAEDIC SURGERY

## 2024-10-25 PROCEDURE — 3044F HG A1C LEVEL LT 7.0%: CPT | Performed by: ORTHOPAEDIC SURGERY

## 2024-10-25 PROCEDURE — 3048F LDL-C <100 MG/DL: CPT | Performed by: ORTHOPAEDIC SURGERY

## 2024-10-25 ASSESSMENT — PAIN - FUNCTIONAL ASSESSMENT: PAIN_FUNCTIONAL_ASSESSMENT: 0-10

## 2024-10-25 NOTE — LETTER
October 27, 2024     Yadi Dow, APRN-CNP  [address]  [address]  [facility] [address]    Patient: Car Ramirez   YOB: 1950   Date of Visit: 10/25/2024       Dear Dr. Yadi Dow, APRN-CNP:    Thank you for referring Car Ramirez to me for evaluation. Below are my notes for this consultation.  If you have questions, please do not hesitate to call me. I look forward to following your patient along with you.       Sincerely,     Santino Chase MD      CC: Getachew Waggoner MD  ______________________________________________________________________________________    HPI:Vincent Ramirez 74-year-old man, who comes in with complaints of chronic right-sided low back pain.  He denies pain down the leg.  He has been taking gabapentin.  He is also seeing pain management.  He has not had physical therapy and/or aqua therapy.      ROS:  Reviewed on EMR and patient intake sheet.    PMH/SH:  Reviewed on EMR and patient intake sheet.    Exam:  Physical Exam    Constitutional: Well appearing; no acute distress  Eyes: pupils are equal and round  Psych: normal affect  Respiratory: non-labored breathing  Cardiovascular: regular rate and rhythm  GI: non-distended abdomen  Musculoskeletal: no pain with range of motion of the hips bilaterally  Neurologic: [4+]/5 strength in the lower extremities bilaterally]; [negative] straight leg raise    Radiology:     MRI was personally reviewed.  No evidence of any significant stenosis    Diagnosis:    Chronic low back pain    Assessment and Plan:   74-year-old man with chronic axial low back pain in the absence of any significant stenosis.  I have recommended nonoperative management with combination of physical therapy for core strengthening and aqua therapy for conditioning.  I can see him back as needed.  No surgical intervention required.    The patient was in agreement with the plan. At the end of the visit today, the patient felt that all questions had  been answered satisfactorily.  The patient was pleased with the visit and very appreciative for the care rendered.     Thank you very much for the kind referral.  It is a privilege, and a pleasure, to partner with you in the care of your patients.  I would be delighted to assist you with any further consultations as needed.          Santino Chase MD    Chief of Spine Surgery, [facility]  Director of Spine Service, [facility]  , Department of Orthopaedics  [facility] [employer]  [address] [id number]  P: [phone]  [email or web address]    This note was dictated with voice recognition software.  It has not been proofread for grammatical errors, typographical mistakes or other semantic inconsistencies.

## 2024-10-27 NOTE — PROGRESS NOTES
HPI:Vincent Ramirez 74-year-old man, who comes in with complaints of chronic right-sided low back pain.  He denies pain down the leg.  He has been taking gabapentin.  He is also seeing pain management.  He has not had physical therapy and/or aqua therapy.      ROS:  Reviewed on EMR and patient intake sheet.    PMH/SH:  Reviewed on EMR and patient intake sheet.    Exam:  Physical Exam    Constitutional: Well appearing; no acute distress  Eyes: pupils are equal and round  Psych: normal affect  Respiratory: non-labored breathing  Cardiovascular: regular rate and rhythm  GI: non-distended abdomen  Musculoskeletal: no pain with range of motion of the hips bilaterally  Neurologic: [4+]/5 strength in the lower extremities bilaterally]; [negative] straight leg raise    Radiology:     MRI was personally reviewed.  No evidence of any significant stenosis    Diagnosis:    Chronic low back pain    Assessment and Plan:   74-year-old man with chronic axial low back pain in the absence of any significant stenosis.  I have recommended nonoperative management with combination of physical therapy for core strengthening and aqua therapy for conditioning.  I can see him back as needed.  No surgical intervention required.    The patient was in agreement with the plan. At the end of the visit today, the patient felt that all questions had been answered satisfactorily.  The patient was pleased with the visit and very appreciative for the care rendered.     Thank you very much for the kind referral.  It is a privilege, and a pleasure, to partner with you in the care of your patients.  I would be delighted to assist you with any further consultations as needed.          Santino Chase MD    Chief of Spine Surgery, [facility]  Director of Spine Service, [facility]  , Department of Orthopaedics  [facility] [employer]  [id number]  [place] [address]  P: [phone]  [email or web address]    This note was dictated with voice recognition software.  It has not been proofread for grammatical errors, typographical mistakes or other semantic inconsistencies.

## 2024-10-28 DIAGNOSIS — H04.129 DRY EYE: ICD-10-CM

## 2024-10-28 DIAGNOSIS — M47.817 FACET ARTHROPATHY, LUMBOSACRAL: ICD-10-CM

## 2024-10-28 RX ORDER — GABAPENTIN 300 MG/1
300 CAPSULE ORAL 2 TIMES DAILY
Qty: 60 CAPSULE | Refills: 0 | OUTPATIENT
Start: 2024-10-28

## 2024-10-28 RX ORDER — TOBRAMYCIN AND DEXAMETHASONE 3; 1 MG/ML; MG/ML
1 SUSPENSION/ DROPS OPHTHALMIC
Qty: 10 ML | Refills: 0 | Status: SHIPPED | OUTPATIENT
Start: 2024-10-28 | End: 2024-11-07

## 2024-10-28 RX ORDER — GABAPENTIN 300 MG/1
300 CAPSULE ORAL 2 TIMES DAILY
Qty: 60 CAPSULE | Refills: 0 | Status: SHIPPED | OUTPATIENT
Start: 2024-10-28

## 2024-11-05 ENCOUNTER — APPOINTMENT (OUTPATIENT)
Dept: PRIMARY CARE | Facility: CLINIC | Age: 74
End: 2024-11-05
Payer: MEDICARE

## 2024-11-12 ENCOUNTER — APPOINTMENT (OUTPATIENT)
Dept: PAIN MEDICINE | Facility: HOSPITAL | Age: 74
End: 2024-11-12
Payer: MEDICARE

## 2024-11-13 ENCOUNTER — OFFICE VISIT (OUTPATIENT)
Dept: PAIN MEDICINE | Facility: HOSPITAL | Age: 74
End: 2024-11-13
Payer: MEDICARE

## 2024-11-13 VITALS
DIASTOLIC BLOOD PRESSURE: 80 MMHG | OXYGEN SATURATION: 97 % | WEIGHT: 257 LBS | SYSTOLIC BLOOD PRESSURE: 115 MMHG | RESPIRATION RATE: 16 BRPM | HEART RATE: 89 BPM | TEMPERATURE: 97.6 F | BODY MASS INDEX: 35.98 KG/M2 | HEIGHT: 71 IN

## 2024-11-13 DIAGNOSIS — Z79.899 MEDICATION MANAGEMENT: ICD-10-CM

## 2024-11-13 DIAGNOSIS — M47.817 FACET ARTHROPATHY, LUMBOSACRAL: Primary | ICD-10-CM

## 2024-11-13 LAB
AMPHETAMINES UR QL SCN: NORMAL
BARBITURATES UR QL SCN: NORMAL
BZE UR QL SCN: NORMAL
CANNABINOIDS UR QL SCN: NORMAL
CREAT UR-MCNC: 77.9 MG/DL (ref 20–370)
PCP UR QL SCN: NORMAL

## 2024-11-13 PROCEDURE — 3079F DIAST BP 80-89 MM HG: CPT | Performed by: NURSE PRACTITIONER

## 2024-11-13 PROCEDURE — 1125F AMNT PAIN NOTED PAIN PRSNT: CPT | Performed by: NURSE PRACTITIONER

## 2024-11-13 PROCEDURE — 1160F RVW MEDS BY RX/DR IN RCRD: CPT | Performed by: NURSE PRACTITIONER

## 2024-11-13 PROCEDURE — 3044F HG A1C LEVEL LT 7.0%: CPT | Performed by: NURSE PRACTITIONER

## 2024-11-13 PROCEDURE — 1036F TOBACCO NON-USER: CPT | Performed by: NURSE PRACTITIONER

## 2024-11-13 PROCEDURE — 1159F MED LIST DOCD IN RCRD: CPT | Performed by: NURSE PRACTITIONER

## 2024-11-13 PROCEDURE — 80355 GABAPENTIN NON-BLOOD: CPT | Performed by: NURSE PRACTITIONER

## 2024-11-13 PROCEDURE — 3060F POS MICROALBUMINURIA REV: CPT | Performed by: NURSE PRACTITIONER

## 2024-11-13 PROCEDURE — 1123F ACP DISCUSS/DSCN MKR DOCD: CPT | Performed by: NURSE PRACTITIONER

## 2024-11-13 PROCEDURE — 80307 DRUG TEST PRSMV CHEM ANLYZR: CPT | Performed by: NURSE PRACTITIONER

## 2024-11-13 PROCEDURE — 3048F LDL-C <100 MG/DL: CPT | Performed by: NURSE PRACTITIONER

## 2024-11-13 PROCEDURE — 99213 OFFICE O/P EST LOW 20 MIN: CPT | Performed by: NURSE PRACTITIONER

## 2024-11-13 PROCEDURE — 3008F BODY MASS INDEX DOCD: CPT | Performed by: NURSE PRACTITIONER

## 2024-11-13 PROCEDURE — 3074F SYST BP LT 130 MM HG: CPT | Performed by: NURSE PRACTITIONER

## 2024-11-13 PROCEDURE — 1157F ADVNC CARE PLAN IN RCRD: CPT | Performed by: NURSE PRACTITIONER

## 2024-11-13 RX ORDER — GABAPENTIN 300 MG/1
300 CAPSULE ORAL 3 TIMES DAILY
Qty: 90 CAPSULE | Refills: 2 | Status: SHIPPED | OUTPATIENT
Start: 2024-11-13

## 2024-11-13 ASSESSMENT — PAIN SCALES - GENERAL: PAINLEVEL_OUTOF10: 3

## 2024-11-13 ASSESSMENT — ENCOUNTER SYMPTOMS
MYALGIAS: 1
NEUROLOGICAL NEGATIVE: 1
ENDOCRINE NEGATIVE: 1
GASTROINTESTINAL NEGATIVE: 1
CONSTITUTIONAL NEGATIVE: 1
PSYCHIATRIC NEGATIVE: 1
JOINT SWELLING: 0
NECK PAIN: 0
ALLERGIC/IMMUNOLOGIC NEGATIVE: 1
BACK PAIN: 1
RESPIRATORY NEGATIVE: 1
CARDIOVASCULAR NEGATIVE: 1
NECK STIFFNESS: 0
EYES NEGATIVE: 1
ARTHRALGIAS: 1

## 2024-11-13 NOTE — PROGRESS NOTES
Last urine drug screening date/ordered today: 6/10/2024     Results of last screen: As expected    Last opioid risk screening date/ordered today: 6/10/2024      Pain Scale Screening:   Pain Assessment and Documentation Tool (PADT)   Date of Assessment: 11/13/2024  Analgesia:   Patient reports her pain level on average during the past week is 3on a 0 - 10 scale.   Patient reports that her pain level at its worst during the past week was 8 on a 0 -10 scale.   50% of pain has been relieved during the past week per patient   Patient states that the amount of pain relief she is now obtaining from her current pain reliever(s) is enough to make a real difference in her life.   Query to clinician: Is the patient's pain relief clinically significant? yes  Activities of Daily Living:   Physical functioning: better  Family relationships: better  Social relationships: better  Mood: better  Sleep patterns: unchanged  Overall functioning: unchanged  Adverse Events: No, Vincent Ramirez is not experiencing side effects from current pain reliever.  Patients overall severity of side effect:none  Specific Analgesic Plan: Continue present regimen.

## 2024-11-13 NOTE — PROGRESS NOTES
Subjective   Car Ramirez is a pleasant 74 y.o. male who is here for postprocedure follow-up.  Patient is ambulatory without assistive device.  Gait is steady.  He arrives with his spouse.    Patient had right SI joint injection done on 9/16/2024.  The injection has improved his back pain.  He reports it provided 80% relief and he is still feeling better.  The injection has improved his pain, ability to participate in his daily activities, ability to perform his job and ability to enjoy social activities.    Patient continues to have  chronic lower back pain.  He reports pain is a little higher from where the injections.  He rates his pain as 3 out of 10.  It is constant.  He describes it as aching and tender.  Back pain is aggravated with prolonged standing or walking or certain activities.  He denies pain, numbness or tingling sensation to his legs.  He denies leg weakness or change in balance.  He denies bowel or bladder incontinence.  He denies recent falls, injuries, or ER visits.  There has been no changes since he was last seen.    Patient continues to take gabapentin 300 mg twice a day.  He takes Tylenol on occasion.  Patient saw Dr. Chase on 10/25/2024 where he recommended to increase his gabapentin to 3 times a day.  Patient has not started this yet.     I reviewed previous notes and imaging.  I discussed the plan of care.  I will see him for his follow-up visit.  Questions were answered during this encounter.    The patient was counseled regarding diagnostic results, instructions for management, risk factor reductions, risks and benefits of treatment options and importance of compliance with treatment.    ---------------  PROCEDURES:    9/16/2024: Right SI injection  6/24/2024: Bilateral L4-L5, L5-S1 RFA  3/10/2023: Left shoulder injection  1/24/2023: Bilateral L4-L5, L5-S1 RFA  12/20/2022: Bilateral L4-L5, L5-S1 diagnostic MBB #2  10/10/2022: Bilateral L4-L5, L5-S1 diagnostic MBB  #1  -------------    OARRS:  Carlene Uriarte, APRN-CNP, DNP on 11/13/2024  3:11 PM  I have personally reviewed the OARRS report for Vincent Ramirez. I have considered the risks of abuse, dependence, addiction and diversion    Review of Systems   Constitutional: Negative.    HENT: Negative.     Eyes: Negative.    Respiratory: Negative.     Cardiovascular: Negative.    Gastrointestinal: Negative.    Endocrine: Negative.    Genitourinary: Negative.    Musculoskeletal:  Positive for arthralgias, back pain and myalgias. Negative for gait problem, joint swelling, neck pain and neck stiffness.   Skin: Negative.    Allergic/Immunologic: Negative.    Neurological: Negative.    Psychiatric/Behavioral: Negative.            /80 (BP Location: Right arm, Patient Position: Sitting, BP Cuff Size: Adult)   Pulse 89   Temp 36.4 °C (97.6 °F) (Temporal)   Resp 16   Wt 117 kg (257 lb)   SpO2 97%  Body mass index is 35.84 kg/m².      Objective       Past Medical History  He has a past medical history of Acute sinusitis, unspecified (12/26/2023), Cough (12/26/2023), Diabetes (Multi), and Personal history of non-Hodgkin lymphomas (02/13/2014).    Surgical History  Past Surgical History:   Procedure Laterality Date    APPENDECTOMY  07/27/2021    Appendectomy    HAND SURGERY  02/13/2014    Hand Surgery                                                                                                                                                          HERNIA REPAIR  07/27/2021    Hernia Repair    OTHER SURGICAL HISTORY  02/13/2014    Nasal Endoscopy Polypectomy    OTHER SURGICAL HISTORY  07/27/2021    Throat Surgery    TONSILLECTOMY  07/27/2021    Tonsillectomy        Social History  He reports that he has never smoked. He has never used smokeless tobacco. He reports that he does not currently use alcohol. He reports that he does not use drugs.    Family History  Family History   Problem Relation Name Age of Onset    No Known  Problems Other          Allergies  Penicillins    MEDICATIONS:    Current Outpatient Medications:     alfuzosin (Uroxatral) 10 mg 24 hr tablet, Take 1 tablet (10 mg) by mouth once daily. Do not crush, chew, or split., Disp: 90 tablet, Rfl: 1    aspirin 81 mg EC tablet, Take 1 tablet (81 mg) by mouth once daily., Disp: , Rfl:     dicyclomine (Bentyl) 10 mg capsule, Take 1 capsule (10 mg) by mouth once daily as needed., Disp: , Rfl:     ergocalciferol (Vitamin D-2) 1.25 MG (57775 UT) capsule, Take 1 capsule (50,000 Units) by mouth 1 (one) time per week., Disp: 30 capsule, Rfl: 1    oxybutynin (Ditropan) 5 mg tablet, Take 1 tablet (5 mg) by mouth 2 times a day., Disp: 180 tablet, Rfl: 0    Ozempic 2 mg/dose (8 mg/3 mL) pen injector, Inject 2 mg under the skin 1 (one) time per week., Disp: 3 mL, Rfl: 3    sertraline (Zoloft) 50 mg tablet, Take 1 tablet (50 mg) by mouth once daily., Disp: 90 tablet, Rfl: 1    traZODone (Desyrel) 100 mg tablet, Take 1 tablet (100 mg) by mouth once daily at bedtime., Disp: 90 tablet, Rfl: 1    gabapentin (Neurontin) 300 mg capsule, Take 1 capsule (300 mg) by mouth 3 times a day., Disp: 90 capsule, Rfl: 2    Physical Exam  Vitals and nursing note reviewed.   HENT:      Head: Normocephalic.      Nose: Nose normal.   Eyes:      Extraocular Movements: Extraocular movements intact.      Conjunctiva/sclera: Conjunctivae normal.      Pupils: Pupils are equal, round, and reactive to light.   Cardiovascular:      Rate and Rhythm: Normal rate and regular rhythm.   Pulmonary:      Effort: Pulmonary effort is normal.      Breath sounds: Normal breath sounds.   Genitourinary:     Comments: Deferred  Musculoskeletal:         General: Tenderness present. No swelling, deformity or signs of injury.      Cervical back: No rigidity or tenderness.      Right lower leg: No edema.      Left lower leg: No edema.      Comments: Negative leg raise.  Negative Fabere's test.  Negative for tenderness on palpation at  the SI joints.  Negative for paraspinal tenderness throughout the lumbar region.  No radicular symptoms.  BUE 5/5, BLE 5/5.   Skin:     General: Skin is warm and dry.   Neurological:      General: No focal deficit present.      Mental Status: He is alert and oriented to person, place, and time.   Psychiatric:         Mood and Affect: Mood normal.         Behavior: Behavior normal.            Pain Management Panel  More data exists         Latest Ref Rng & Units 6/10/2024 8/8/2023   Pain Management Panel   Amphetamine Screen, Urine Presumptive Negative Presumptive Negative  PRESUMPTIVE NEGATIVE    Barbiturate Screen, Urine Presumptive Negative Presumptive Negative  PRESUMPTIVE NEGATIVE    Codeine IgE <50 ng/mL <50  -   Fentanyl Screen, Urine NEGATIVE - PRESUMPTIVE NEGATIVE    Hydromorphone Urine <25 ng/mL <25  -   Methadone Screen, Urine NEGATIVE - PRESUMPTIVE NEGATIVE    Morphine  <50 ng/mL <50  -      Details                      Assessment/Plan   Problem List Items Addressed This Visit             ICD-10-CM    Facet arthropathy, lumbosacral - Primary M47.817    Relevant Medications    gabapentin (Neurontin) 300 mg capsule    Other Relevant Orders    PT eval and treat     Other Visit Diagnoses         Codes    Medication management     Z79.899    Relevant Orders    Opiate/Opioid/Benzo Prescription Compliance    Gabapentin,Urine    OOB Internal Tracking                Plan/Follow-up Instructions:    Continue with your prescribed medications.    I increase your gabapentin.  Start taking 300 mg 3 times a day if tolerated.    I ordered physical therapy.  Please get this scheduled    I will see you for a follow-up visit in 3 months.  Please call the office if needing sooner appointment      Time     Prep time on date of the patient encounter: 2 minutes  Time spent directly with patient/family/caregiver: 25 minutes  Documentation time: 2 minutes  Total time on date of patient encounter: 29  minutes      --------------  Disclaimer: This note was created using voice recognition software. It was not corrected for typographical or grammatical errors, inadvertent word insertion, or any unintended errors. Please feel free to contact me for clarification.  --------------      Carlene Uriarte, COLIN, APRN, FNP-C   [facility]  Office #: [phone]  Fax # [phone]

## 2024-11-13 NOTE — PATIENT INSTRUCTIONS
Continue with your prescribed medications.    I increase your gabapentin.  Start taking 300 mg 3 times a day if tolerated.    I ordered physical therapy.  Please get this scheduled    I will see you for a follow-up visit in 3 months.  Please call the office if needing sooner appointment

## 2024-11-17 ENCOUNTER — TELEPHONE (OUTPATIENT)
Dept: PAIN MEDICINE | Facility: HOSPITAL | Age: 74
End: 2024-11-17

## 2024-11-17 DIAGNOSIS — M47.817 FACET ARTHROPATHY, LUMBOSACRAL: ICD-10-CM

## 2024-11-17 LAB — GABAPENTIN UR-MCNC: 144.1 UG/ML

## 2024-11-19 ENCOUNTER — EVALUATION (OUTPATIENT)
Dept: PHYSICAL THERAPY | Facility: HOSPITAL | Age: 74
End: 2024-11-19
Payer: MEDICARE

## 2024-11-19 DIAGNOSIS — M54.9 BACK PAIN: Primary | ICD-10-CM

## 2024-11-19 PROCEDURE — 97161 PT EVAL LOW COMPLEX 20 MIN: CPT | Mod: GP

## 2024-11-19 RX ORDER — GABAPENTIN 300 MG/1
300 CAPSULE ORAL 2 TIMES DAILY
Qty: 60 CAPSULE | Refills: 0 | Status: SHIPPED | OUTPATIENT
Start: 2024-11-19 | End: 2024-11-25 | Stop reason: SDUPTHER

## 2024-11-19 ASSESSMENT — PATIENT HEALTH QUESTIONNAIRE - PHQ9
2. FEELING DOWN, DEPRESSED OR HOPELESS: SEVERAL DAYS
SUM OF ALL RESPONSES TO PHQ9 QUESTIONS 1 AND 2: 1
1. LITTLE INTEREST OR PLEASURE IN DOING THINGS: NOT AT ALL
10. IF YOU CHECKED OFF ANY PROBLEMS, HOW DIFFICULT HAVE THESE PROBLEMS MADE IT FOR YOU TO DO YOUR WORK, TAKE CARE OF THINGS AT HOME, OR GET ALONG WITH OTHER PEOPLE: SOMEWHAT DIFFICULT

## 2024-11-19 ASSESSMENT — ENCOUNTER SYMPTOMS
DEPRESSION: 0
OCCASIONAL FEELINGS OF UNSTEADINESS: 1
LOSS OF SENSATION IN FEET: 0

## 2024-11-19 NOTE — PROGRESS NOTES
"  Physical Therapy  Physical Therapy Orthopedic Evaluation    Patient Name: Vincent Ramirez \"Car\"  MRN: [id number]  Encounter Date: 11/19/2024  Time Calculation  Start Time: 1030  Stop Time: 1105  Time Calculation (min): 35 min    PT Evaluation Time Entry  PT Evaluation (Low) Time Entry: 35      Insurance:  Visit number: 1 of [place]  Authorization info: no auth required  Payor: MEDICARE / Plan: MEDICARE PART A AND B / Product Type: *No Product type* /     Current Problem  Problem List Items Addressed This Visit    None  Visit Diagnoses         Codes    Back pain    -  Primary M54.9    Relevant Orders    Follow Up In Physical Therapy          1. Back pain  Follow Up In Physical Therapy          General:  General  Reason for Referral: LBP  Referred By: Carlene SOTO CNP  Past Medical History Relevant to Rehab: non hodgkins lymphoma, RA, arthritis      Precautions:   Precautions  Medical Precautions: No known precautions/limitation    Medical History Form: Reviewed (scanned into chart)    Subjective:   Subjective   Chief Complaint: Patient presents to clinic w c/c of LBP. States his lower legs will bother him at times and he gets N/T w squatting. Pt states his main difficulty is getting up from laying down. Pt states   Onset Date: approx 20 years ago  RM: Insidious and Chronic    Current Condition:   Better    Pain:   Current: 6/10  Highest: 10/10 pain  Lowest: 2/10 pain  Location: low back, center  Description: dull, hard to move, creaky  Aggravating Factors:  prolonged positioning   Relieving Factors:  Rest and Heat    Relevant Information (PMH & Previous Tests/Imaging): pt states x-ray shows arthritis  Previous Interventions/Treatments: Injections    Prior Level of Function (PLOF)  Patient previously independent with all ADLs  Exercise/Physical Activity: none reported  Work/School: retired  Hobbies: none reported    Patients Living Environment: Reviewed and no concern    Primary Language: English    Patient's " Goal(s) for Therapy: decrease pain, 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 Yes  Fever/chills, unexplained weight changes, dizziness/fainting, unexplained change in bowel or bladder functions, unexplained malaise or muscle weakness, night pain/sweats, numbness or tingling  Encouraged to follow up w appropriate providers as needed.    Objective:  Objective       ROM    Lumbar AROM (%)    Flexion: no limitations  Extension: limited by approx 50%  (L) Side Bend: no limitations  (R) Side Bend: no limitations  (L) Rotation: no limitations  (R) Rotation: no limitations    Hip AROM / PROM WNL     Knee AROM  /PROM WNL           Strength Testing    Core/Abdominals: 3/5     Hip      (R)  (L)  Flexion: 4+/5  4+/5     Extension: 3/5  3/5    Abduction: 3/5  3/5      Knee    (R)  (L)  Flexion: 5/5  5/5      Extension: 5/5  5/5          Functional Screening    Lower Extremity Functional Movements  Transfers: Independent  Gait: antalgic  Assistive Device: no device    Palpation: TTP at L1-3     Flexibility: good HS and quad flexibility    Observation: large anterior abdomen    Posture: shoulder rounded forward and decreased lumbar lordosis    Orthotics: none reported      Special Tests    Response to repeated L/S movements for directional preference: appears to be ext    Outcome Measures:  ION: 46%     EDUCATION:   Individual(s) Educated: patient  Education Provided: Home exercise program, plan of care, activity modifications, pain management, and injury pathology  Handout(s) Provided: Scanned into chart  Home Program:     Access Code: [email or web address]  URL: [email or web address]  Date: 11/19/2024  Prepared by: Lorena Agosto    Exercises  - Isometric Dead Bug  - 1-2 x daily - 7 x weekly - 2-3 sets - 10 reps  - Supine Bridge  - 1-2 x daily - 7 x weekly - 2 sets - 10 reps  - Supine Lower Trunk Rotation  - 1-2 x daily - 7 x weekly - 2 sets - 10 reps  -  Prone Press Up On Elbows  - 1-2 x daily - 7 x weekly - 3 sets - 20-30 sec hold    Risk and Benefits Discussed with Patient/Caregiver/Other: Yes   Patient/Caregiver Demonstrated Understanding: Yes   Plan of Care Discussed and Agreed Upon: Yes   Patient Response to Education: Patient/Caregiver verbalized understanding of information, Patient/Caregiver performed return demonstration of exercises/activities, and Patient/Caregiver asked appropriate questions    Assessment: Patient presents with signs and symptoms consistent with LBP w core and lauren hip weakness, resulting in limited participation in pain-free ADLs and inability to perform at their prior level of function. Skilled PT warranted to address the above stated impairments, so the patient can perform FA's without increased pain or difficulty.        Screening  Frequency  Date Last Completed   Spiritual and Cultural Beliefs   Screening  each visit or episode of care 11/19/2024   Falls Risk Screening  every ambulatory visit [unfilled]   Pain Screening  annually at primary care visit  11/13/2024   Domestic Violence screening  annually at primary care visit 11/19/2024   Elder Abuse Screening  annually at primary care visit 11/19/2024   Depression Screening  annually in the primary care setting 11/19/2024   Suicide Risk Screening  annually in the primary care setting 11/19/2024   Nutrition and Food Insecurity   Screening  at least annually at primary care visit     Key Learner  annually in the primary care setting 11/19/2024   Drug Screen  10/22/2024  8:54 AM   Alcohol Screen  10/22/2024  8:54 AM   Advance Directive  9/16/2024         Clinical Presentation: Stable and/or uncomplicated characteristics    Complexity: low    Plan:  Treatment/Interventions: Education/ Instruction, Manual therapy, Neuromuscular re-education, Therapeutic activities, Therapeutic exercises  PT Plan: Skilled PT  PT Frequency: 2 times per week  Duration: 4 weeks  Onset Date:  11/19/04  Certification Period Start Date: 11/19/24  Certification Period End Date: 12/17/24  Number of Treatments Authorized: 1 of [place]  Rehab Potential: Good  Plan of Care Agreement: Patient    Goals: Set and discussed today  Active       PT Problem       Pt will improve core strength by 1 grade or more as evidence of improved functional mobility.         Start:  11/19/24    Expected End:  12/17/24            Pt will improve lauren hip ext/abd strength by 1 grade or more as evidence of improved functional mobility.         Start:  11/19/24    Expected End:  12/17/24            Pt will be indep and compliant in administering HEP        Start:  11/19/24    Expected End:  12/17/24            Pt will report decreased pain to no more than </=3/10.        Start:  11/19/24    Expected End:  12/17/24            Pt will improve ION score to <38% as evidence of improved functional ability.        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

## 2024-11-25 DIAGNOSIS — M47.817 FACET ARTHROPATHY, LUMBOSACRAL: ICD-10-CM

## 2024-11-25 RX ORDER — GABAPENTIN 300 MG/1
300 CAPSULE ORAL 3 TIMES DAILY
Qty: 270 CAPSULE | Refills: 1 | Status: SHIPPED | OUTPATIENT
Start: 2024-11-25

## 2024-11-29 ENCOUNTER — DOCUMENTATION (OUTPATIENT)
Dept: PHYSICAL THERAPY | Facility: HOSPITAL | Age: 74
End: 2024-11-29
Payer: MEDICARE

## 2024-11-29 ENCOUNTER — APPOINTMENT (OUTPATIENT)
Dept: PHYSICAL THERAPY | Facility: HOSPITAL | Age: 74
End: 2024-11-29
Payer: MEDICARE

## 2024-11-29 NOTE — PROGRESS NOTES
"Physical Therapy                 Therapy Communication Note    Patient Name: Vincent Ramirez \"Car\"  MRN: [id number]  Department:   Room: Room/bed info not found  Today's Date: 11/29/2024     Discipline: Physical Therapy    Missed Time: Cancel    Comment: Pt cancelling d/t weather, next mark scheduled for 12/6/2024.  "

## 2024-12-02 ENCOUNTER — APPOINTMENT (OUTPATIENT)
Dept: PHYSICAL THERAPY | Facility: HOSPITAL | Age: 74
End: 2024-12-02
Payer: MEDICARE

## 2024-12-02 ENCOUNTER — DOCUMENTATION (OUTPATIENT)
Dept: PHYSICAL THERAPY | Facility: HOSPITAL | Age: 74
End: 2024-12-02
Payer: MEDICARE

## 2024-12-02 NOTE — PROGRESS NOTES
"Physical Therapy                 Therapy Communication Note    Patient Name: Vincent Ramirez \"Car\"  MRN: [id number]  Department:   Room: Room/bed info not found  Today's Date: 12/2/2024     Discipline: Physical Therapy    Missed Time: Cancel    Comment: Pt cancelling stating that he has to take family [place] to the airport. Next amrk scheduled for 12/6/2024.  "

## 2024-12-06 ENCOUNTER — TREATMENT (OUTPATIENT)
Dept: PHYSICAL THERAPY | Facility: HOSPITAL | Age: 74
End: 2024-12-06
Payer: MEDICARE

## 2024-12-06 DIAGNOSIS — M54.9 BACK PAIN: ICD-10-CM

## 2024-12-06 PROCEDURE — 97110 THERAPEUTIC EXERCISES: CPT | Mod: GP

## 2024-12-06 ASSESSMENT — PAIN - FUNCTIONAL ASSESSMENT: PAIN_FUNCTIONAL_ASSESSMENT: 0-10

## 2024-12-06 ASSESSMENT — PAIN SCALES - GENERAL: PAINLEVEL_OUTOF10: 3

## 2024-12-06 NOTE — PROGRESS NOTES
Physical Therapy Treatment    Patient Name: Vincent Ramirez  MRN: [id number]  Today's Date: 12/6/2024  Payor: MEDICARE / Plan: MEDICARE PART A AND B / Product Type: *No Product type* /   Time Calculation  Start Time: 0948  Stop Time: 1029  Time Calculation (min): 41 min        PT Therapeutic Procedures Time Entry  Therapeutic Exercise Time Entry: 41      Current Problem  1. Back pain  Follow Up In Physical Therapy        Problem List Items Addressed This Visit    None  Visit Diagnoses         Codes    Back pain     M54.9            General  Reason for Referral: LBP  Referred By: Carlene SOTO CNP  Past Medical History Relevant to Rehab: non hodgkins lymphoma, RA, arthritis    Subjective   Current Condition:   Better  Patient reports feeling good and stating that the HEP has been going well and he feels it is helping. States he has modified the HEP as needed for his set up at home.     Performing HEP?: Yes    Precautions  Precautions  Medical Precautions: No known precautions/limitation  Pain  Pain Assessment: 0-10  0-10 (Numeric) Pain Score: 3  Pain Type: Chronic pain  Pain Location: Back  Pain Orientation: Lower    Objective     Treatments:    Therapeutic Exercise  Therapeutic Exercise Performed: Yes  Therapeutic Exercise Activity 1: physio step x6 min as warm up  Therapeutic Exercise Activity 2: 3x20 supine penguin waddles  Therapeutic Exercise Activity 3: 2x30 sec deadbugs  Therapeutic Exercise Activity 4: 2x10 clamshells w GTB  Therapeutic Exercise Activity 5: 2x10 open books  Therapeutic Exercise Activity 6: 2x10 prone press ups  Therapeutic Exercise Activity 7: 2x10 prone hip ext  Therapeutic Exercise Activity 8: 1x10 bridges  Therapeutic Exercise Activity 9: seated thoracic ext 2x10  Therapeutic Exercise Activity 10: hip abd machine 3x10 w 55#     EDUCATION:   Individual(s) Educated: Patient  Education Provided: goal interventions  Handout(s) Provided: Scanned into chart  Home Program: same as previous  added in:    Access Code: [email or web address]  URL: [email or web address]  Date: 12/06/2024  Prepared by: Lorena Agosto    Exercises  - Tandem Stance with Support  - 1-2 x daily - 7 x weekly - 3 reps - 20-30 sec hold  - Standing Romberg to 3/4 Tandem Stance  - 1-2 x daily - 7 x weekly - 3 reps - 20-30 sec hold    Risk and Benefits Discussed with Patient/Caregiver/Other: Yes   Patient/Caregiver Demonstrated Understanding: Yes   Patient Response to Education: Patient/Caregiver verbalized understanding of information, Patient/Caregiver performed return demonstration of exercises/activities, and Patient/Caregiver asked appropriate questions    Assessment:   Pt tolerating first full exercise program well. Reporting fair improvement w HEP only so far. Will cont w POC as below and progress pt as he tolerates. Pt cont to have pain w ADLs and cont to demo significant core weakness. Pt cont to demo deficits as stated, and requires cont skilled PT intervention to assist pt in returning to PLOF.        Plan: Continue with POC. Focus on core strengthening and trunk ext preference w hip strengthening. Will incorporate more balance next session.   OP PT Plan  Treatment/Interventions: Education/ Instruction, Manual therapy, Neuromuscular re-education, Therapeutic activities, Therapeutic exercises  PT Plan: Skilled PT  PT Frequency: 2 times per week  Duration: 4 weeks  Onset Date: 11/19/04  Certification Period Start Date: 11/19/24  Certification Period End Date: 12/17/24  Number of Treatments Authorized: 4/8 (d/t cancelled visits)  Rehab Potential: Good  Plan of Care Agreement: Patient    Goals:  Active       PT Problem       Pt will improve core strength by 1 grade or more as evidence of improved functional mobility.   (Progressing)       Start:  11/19/24    Expected End:  12/17/24            Pt will improve lauren hip ext/abd strength by 1 grade or more as evidence of improved functional mobility.   (Progressing)        Start:  11/19/24    Expected End:  12/17/24            Pt will be indep and compliant in administering HEP  (Progressing)       Start:  11/19/24    Expected End:  12/17/24            Pt will report decreased pain to no more than </=3/10.  (Progressing)       Start:  11/19/24    Expected End:  12/17/24            Pt will improve ION score to <38% as evidence of improved functional ability.  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

## 2024-12-09 DIAGNOSIS — E11.69 TYPE 2 DIABETES MELLITUS WITH OTHER SPECIFIED COMPLICATION, WITHOUT LONG-TERM CURRENT USE OF INSULIN: ICD-10-CM

## 2024-12-09 RX ORDER — SEMAGLUTIDE 2.68 MG/ML
INJECTION, SOLUTION SUBCUTANEOUS
Qty: 3 ML | Refills: 3 | Status: SHIPPED | OUTPATIENT
Start: 2024-12-09

## 2024-12-10 ENCOUNTER — APPOINTMENT (OUTPATIENT)
Dept: PHYSICAL THERAPY | Facility: HOSPITAL | Age: 74
End: 2024-12-10
Payer: MEDICARE

## 2024-12-10 DIAGNOSIS — M54.9 BACK PAIN: ICD-10-CM

## 2024-12-10 PROCEDURE — 97110 THERAPEUTIC EXERCISES: CPT | Mod: GP

## 2024-12-10 PROCEDURE — 97112 NEUROMUSCULAR REEDUCATION: CPT | Mod: GP

## 2024-12-10 ASSESSMENT — PAIN SCALES - GENERAL: PAINLEVEL_OUTOF10: 2

## 2024-12-10 ASSESSMENT — PAIN - FUNCTIONAL ASSESSMENT: PAIN_FUNCTIONAL_ASSESSMENT: 0-10

## 2024-12-10 NOTE — PROGRESS NOTES
Physical Therapy Treatment    Patient Name: Vincent Ramirez  MRN: [id number]  Today's Date: 12/10/2024  Payor: MEDICARE / Plan: MEDICARE PART A AND B / Product Type: *No Product type* /   Time Calculation  Start Time: 0945  Stop Time: 1027  Time Calculation (min): 42 min        PT Therapeutic Procedures Time Entry  Neuromuscular Re-Education Time Entry: 22  Therapeutic Exercise Time Entry: 20       Current Problem  1. Back pain  Follow Up In Physical Therapy        Problem List Items Addressed This Visit    None  Visit Diagnoses         Codes    Back pain     M54.9            General  Reason for Referral: LBP  Referred By: Carlene SOTO CNP  Past Medical History Relevant to Rehab: non hodgkins lymphoma, RA, arthritis  Subjective   Current Condition:   Better  Patient reports feeling good today. Minimal pain noted but states he feels he is improving. States the new balance exercises added last session are going well.    Performing HEP?: Yes    Precautions  Precautions  Medical Precautions: No known precautions/limitation  Pain  Pain Assessment: 0-10  0-10 (Numeric) Pain Score: 2  Pain Type: Chronic pain  Pain Location: Back  Pain Orientation: Lower    Objective     Treatments:    Therapeutic Exercise  Therapeutic Exercise Performed: Yes  Therapeutic Exercise Activity 1: physio step x6 min as warm up  Therapeutic Exercise Activity 2: 2x10 vuong hip ext  Therapeutic Exercise Activity 3: 2x10 vuong knee flex  Therapeutic Exercise Activity 4: 2x10 vuong back ext  Therapeutic Exercise Activity 5: seated russian twists 3x10 w 7# DB  Therapeutic Exercise Activity 6: seated thoracic ext 2x10  Therapeutic Exercise Activity 7: 2x10 squats (good depth but using // bars for stability. reporting dizziness following 2nd set)    Balance/Neuromuscular Re-Education  Balance/Neuromuscular Re-Education Activity Performed: Yes  Balance/Neuromuscular Re-Education Activity 1: 2x30 sec NBOS on airex pad  Balance/Neuromuscular  "Re-Education Activity 2: 2x till failure SLS w BUE support on neuro pole. some assst given on pole, abl to hold for approx 20 sec  Balance/Neuromuscular Re-Education Activity 3: tandem amb x4 in // bars, intermittent use of // bars for steadying. fairly unsteady  Balance/Neuromuscular Re-Education Activity 4: retro amb x6 in // bars, intermittent use of // bars for steadying. vcs for increased step length w good return noted  Balance/Neuromuscular Re-Education Activity 5: 2x30 sec static tandem stance. increased difficulty on L side.  Balance/Neuromuscular Re-Education Activity 6: 1x10 fwd/lateral/bwd stepping on top of \"X.\" increased difficulty on R side noted.     EDUCATION:   Individual(s) Educated: Patient  Education Provided: goal of interventions  Handout(s) Provided: Scanned into chart  Home Program: same as previous  Risk and Benefits Discussed with Patient/Caregiver/Other: Yes   Patient/Caregiver Demonstrated Understanding: Yes   Patient Response to Education: Patient/Caregiver verbalized understanding of information, Patient/Caregiver performed return demonstration of exercises/activities, and Patient/Caregiver asked appropriate questions    Assessment:   Pt tolerating session well, progressed slightly w strengthening exercises and added in more balance interventions this session. Pt demo'ing moderate amount of balance deficits and struggled w higher level balance activities. Pt cont to demo deficits as stated, and requires cont skilled PT intervention to assist pt in returning to PLOF.        Plan: Continue with POC. Jerry hip and core strengthening w spine ROM and balance.   OP PT Plan  Treatment/Interventions: Education/ Instruction, Manual therapy, Neuromuscular re-education, Therapeutic activities, Therapeutic exercises  PT Plan: Skilled PT  PT Frequency: 2 times per week  Duration: 4 weeks  Onset Date: 11/19/04  Certification Period Start Date: 11/19/24  Certification Period End Date: 12/17/24  Number " of Treatments Authorized: 5/8  Rehab Potential: Good  Plan of Care Agreement: Patient    Goals:  Active       PT Problem       Pt will improve core strength by 1 grade or more as evidence of improved functional mobility.   (Progressing)       Start:  11/19/24    Expected End:  12/17/24            Pt will improve lauren hip ext/abd strength by 1 grade or more as evidence of improved functional mobility.   (Progressing)       Start:  11/19/24    Expected End:  12/17/24            Pt will be indep and compliant in administering HEP  (Progressing)       Start:  11/19/24    Expected End:  12/17/24            Pt will report decreased pain to no more than </=3/10.  (Progressing)       Start:  11/19/24    Expected End:  12/17/24            Pt will improve ION score to <38% as evidence of improved functional ability.  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

## 2024-12-13 ENCOUNTER — APPOINTMENT (OUTPATIENT)
Dept: PHYSICAL THERAPY | Facility: HOSPITAL | Age: 74
End: 2024-12-13
Payer: MEDICARE

## 2024-12-13 DIAGNOSIS — M54.9 BACK PAIN: ICD-10-CM

## 2024-12-13 PROCEDURE — 97110 THERAPEUTIC EXERCISES: CPT | Mod: GP

## 2024-12-13 ASSESSMENT — PAIN SCALES - GENERAL: PAINLEVEL_OUTOF10: 2

## 2024-12-13 ASSESSMENT — PAIN - FUNCTIONAL ASSESSMENT: PAIN_FUNCTIONAL_ASSESSMENT: 0-10

## 2024-12-13 NOTE — PROGRESS NOTES
Physical Therapy Treatment    Patient Name: Vincent Ramirez  MRN: [id number]  Today's Date: 12/13/2024  Payor: MEDICARE / Plan: MEDICARE PART A AND B / Product Type: *No Product type* /   Time Calculation  Start Time: 1430  Stop Time: 1508  Time Calculation (min): 38 min        PT Therapeutic Procedures Time Entry  Therapeutic Exercise Time Entry: 38          Current Problem  1. Back pain  Follow Up In Physical Therapy        Problem List Items Addressed This Visit    None  Visit Diagnoses         Codes    Back pain     M54.9            General  Reason for Referral: LBP  Referred By: Carlene SOTO CNP  Past Medical History Relevant to Rehab: non hodgkins lymphoma, RA, arthritis  Subjective   Current Condition:   Better  Patient reports feeling a little extra sore today, and he felt an increase in stiffness this AM.     Performing HEP?: Yes    Precautions  Precautions  Medical Precautions: No known precautions/limitation  Pain  Pain Assessment: 0-10  0-10 (Numeric) Pain Score: 2  Pain Type: Chronic pain  Pain Location: Back  Pain Orientation: Lower    Objective     Treatments:    Therapeutic Exercise  Therapeutic Exercise Performed: Yes  Therapeutic Exercise Activity 1: physio step x6 min as warm up  Therapeutic Exercise Activity 2: 2x20 supine penguin waddles  Therapeutic Exercise Activity 3: 2x10 sec alt deadbugs  Therapeutic Exercise Activity 4: 4x10 mini crunches  Therapeutic Exercise Activity 5: seated thoracic ext holding 4# MB 2x10  Therapeutic Exercise Activity 6: standing hip ext 2x10    Balance/Neuromuscular Re-Education  Balance/Neuromuscular Re-Education Activity Performed: Yes  Balance/Neuromuscular Re-Education Activity 1: ladder passes: fwd x2, lateral ea side x2, in outs fwd x2, in/outs lateral x2  Balance/Neuromuscular Re-Education Activity 2: retro amb 20 ft x2, vcs for step length w good return noted     EDUCATION:   Individual(s) Educated: Patient  Education Provided: goal of  interventions  Handout(s) Provided: Scanned into chart  Home Program: added:    Access Code: [email or web address]  URL: [email or web address]  Date: 12/13/2024  Prepared by: Lorena Agosto    Exercises  - Supine Dead Bug with Leg Extension  - 1-2 x daily - 7 x weekly - 2 sets - 10 reps    Risk and Benefits Discussed with Patient/Caregiver/Other: Yes   Patient/Caregiver Demonstrated Understanding: Yes   Patient Response to Education: Patient/Caregiver verbalized understanding of information, Patient/Caregiver performed return demonstration of exercises/activities, and Patient/Caregiver asked appropriate questions    Assessment:   Pt tolerating session well, cont to have low level LBP that pt states is chronic/constant. Performing well w all balance activities this date. Cont to respond well to ext based preference compared to flexion. Pt cont to demo deficits as stated, and requires cont skilled PT intervention to assist pt in returning to PLOF.        Plan: Continue with POC. Cont w core and lauren hip strengthening w trunk ext preference and higher level balance activities.  OP PT Plan  Treatment/Interventions: Education/ Instruction, Manual therapy, Neuromuscular re-education, Therapeutic activities, Therapeutic exercises  PT Plan: Skilled PT  PT Frequency: 2 times per week  Duration: 4 weeks  Onset Date: 11/19/04  Certification Period Start Date: 11/19/24  Certification Period End Date: 12/17/24  Number of Treatments Authorized: 6/8  Rehab Potential: Good  Plan of Care Agreement: Patient    Goals:  Active       PT Problem       Pt will improve core strength by 1 grade or more as evidence of improved functional mobility.   (Progressing)       Start:  11/19/24    Expected End:  12/17/24            Pt will improve lauren hip ext/abd strength by 1 grade or more as evidence of improved functional mobility.   (Progressing)       Start:  11/19/24    Expected End:  12/17/24            Pt will be indep and  compliant in administering HEP  (Progressing)       Start:  11/19/24    Expected End:  12/17/24            Pt will report decreased pain to no more than </=3/10.  (Progressing)       Start:  11/19/24    Expected End:  12/17/24            Pt will improve ION score to <38% as evidence of improved functional ability.  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

## 2024-12-17 ENCOUNTER — APPOINTMENT (OUTPATIENT)
Dept: PHYSICAL THERAPY | Facility: HOSPITAL | Age: 74
End: 2024-12-17
Payer: MEDICARE

## 2024-12-17 DIAGNOSIS — M54.9 BACK PAIN: ICD-10-CM

## 2024-12-17 PROCEDURE — 97110 THERAPEUTIC EXERCISES: CPT | Mod: GP

## 2024-12-17 PROCEDURE — 97112 NEUROMUSCULAR REEDUCATION: CPT | Mod: GP

## 2024-12-17 ASSESSMENT — PAIN SCALES - GENERAL: PAINLEVEL_OUTOF10: 3

## 2024-12-17 ASSESSMENT — PAIN - FUNCTIONAL ASSESSMENT: PAIN_FUNCTIONAL_ASSESSMENT: 0-10

## 2024-12-17 NOTE — PROGRESS NOTES
Physical Therapy Treatment    Patient Name: Vincent Ramirez  MRN: [id number]  Today's Date: 12/17/2024  Payor: MEDICARE / Plan: MEDICARE PART A AND B / Product Type: *No Product type* /   Time Calculation  Start Time: 0947  Stop Time: 1026  Time Calculation (min): 39 min        PT Therapeutic Procedures Time Entry  Neuromuscular Re-Education Time Entry: 20  Therapeutic Exercise Time Entry: 19     Current Problem  1. Back pain  Follow Up In Physical Therapy        Problem List Items Addressed This Visit    None  Visit Diagnoses         Codes    Back pain     M54.9            General  Reason for Referral: LBP  Referred By: Carlene SOTO CNP  Past Medical History Relevant to Rehab: non hodgkins lymphoma, RA, arthritis  Subjective   Current Condition:   Worse  Patient reports feeling an increase in pain/discomfort today. States he feels okay the majority of the time but when he twists today the pain shoots up.     Performing HEP?: Yes    Precautions  Precautions  Medical Precautions: No known precautions/limitation  Pain  Pain Assessment: 0-10  0-10 (Numeric) Pain Score: 3  Pain Type: Chronic pain  Pain Location: Back  Pain Orientation: Lower    Objective     Treatments:    Therapeutic Exercise  Therapeutic Exercise Performed: Yes  Therapeutic Exercise Activity 1: physio step x6 min as warm up  Therapeutic Exercise Activity 2: monster walks 20 ft x4 fwd/bwd/lateral w GTB. intermittent minor LOBs but pt able to recover  Therapeutic Exercise Activity 3: standing hip ext 2x10 w 2# ankle weight  Therapeutic Exercise Activity 5: hip abd machine 3x10 w 60#    Balance/Neuromuscular Re-Education  Balance/Neuromuscular Re-Education Activity Performed: Yes  Balance/Neuromuscular Re-Education Activity 1: 2x30 sec NBOS on airex pad  Balance/Neuromuscular Re-Education Activity 2: 2x till failure SLS w BUE support on neuro pole. some assst given on pole, abl to hold for approx 20 sec  Balance/Neuromuscular Re-Education Activity 3:  tandem amb x6 in // bars, intermittent use of // bars for steadying. fairly unsteady throughout.  Balance/Neuromuscular Re-Education Activity 4: tandem stance 3x approx 20 sec ea side  Balance/Neuromuscular Re-Education Activity 5: sliders in a [place] x5 ea direction ea side, focus on improving single leg stance and balance  Balance/Neuromuscular Re-Education Activity 6: sliders posterior only x10 ea side     EDUCATION:   Individual(s) Educated: Patient  Education Provided: regression d/t pain  Handout(s) Provided: Scanned into chart  Home Program: same as previous  Risk and Benefits Discussed with Patient/Caregiver/Other: Yes   Patient/Caregiver Demonstrated Understanding: Yes   Patient Response to Education: Patient/Caregiver verbalized understanding of information, Patient/Caregiver performed return demonstration of exercises/activities, and Patient/Caregiver asked appropriate questions    Assessment:   Session adjusted today to meet pt's needs of increased pain. Pt denying any increase at end of session. Pt edu on how to modify HEP to maximize progress while prevnting increases in pain. Pt cont to have pain w ADLs and demo's cont hip and core weakness with balance deficits. Pt cont to demo deficits as stated, and requires cont skilled PT intervention to assist pt in returning to PLOF.          Plan: Continue with POC.   OP PT Plan  Treatment/Interventions: Education/ Instruction, Manual therapy, Neuromuscular re-education, Therapeutic activities, Therapeutic exercises  PT Plan: Skilled PT  PT Frequency: 2 times per week  Duration: 4 weeks  Onset Date: 11/19/04  Certification Period Start Date: 11/19/24  Certification Period End Date: 12/17/24  Number of Treatments Authorized: 7/8  Rehab Potential: Good  Plan of Care Agreement: Patient    Goals:  Active       PT Problem       Pt will improve core strength by 1 grade or more as evidence of improved functional mobility.   (Progressing)       Start:  11/19/24     Expected End:  12/17/24            Pt will improve lauren hip ext/abd strength by 1 grade or more as evidence of improved functional mobility.   (Progressing)       Start:  11/19/24    Expected End:  12/17/24            Pt will be indep and compliant in administering HEP  (Progressing)       Start:  11/19/24    Expected End:  12/17/24            Pt will report decreased pain to no more than </=3/10.  (Progressing)       Start:  11/19/24    Expected End:  12/17/24            Pt will improve ION score to <38% as evidence of improved functional ability.  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

## 2024-12-20 ENCOUNTER — APPOINTMENT (OUTPATIENT)
Dept: PHYSICAL THERAPY | Facility: HOSPITAL | Age: 74
End: 2024-12-20
Payer: MEDICARE

## 2024-12-20 DIAGNOSIS — M54.9 BACK PAIN: Primary | ICD-10-CM

## 2024-12-20 PROCEDURE — 97110 THERAPEUTIC EXERCISES: CPT | Mod: GP

## 2024-12-20 ASSESSMENT — PAIN SCALES - GENERAL: PAINLEVEL_OUTOF10: 3

## 2024-12-20 ASSESSMENT — PAIN - FUNCTIONAL ASSESSMENT: PAIN_FUNCTIONAL_ASSESSMENT: 0-10

## 2024-12-20 NOTE — PROGRESS NOTES
Physical Therapy Treatment/Re-check    Patient Name: Vincent Ramirez  MRN: [id number]  Today's Date: 12/20/2024  Payor: MEDICARE / Plan: MEDICARE PART A AND B / Product Type: *No Product type* /   Time Calculation  Start Time: 0945  Stop Time: 1023  Time Calculation (min): 38 min        PT Therapeutic Procedures Time Entry  Therapeutic Exercise Time Entry: 38      Current Problem  1. Back pain  Follow Up In Physical Therapy    Follow Up In Physical Therapy        Problem List Items Addressed This Visit    None  Visit Diagnoses         Codes    Back pain    -  Primary M54.9    Relevant Orders    Follow Up In Physical Therapy            General  Reason for Referral: LBP  Referred By: Carlene SOTO CNP  Past Medical History Relevant to Rehab: non hodgkins lymphoma, RA, arthritis  Subjective   Current Condition:   Better  Patient reports feeling better since his increase in pain last week. States he is still having some but it is getting better. 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    Performing HEP?: Yes    Precautions  Precautions  Medical Precautions: No known precautions/limitation  Pain  Pain Assessment: 0-10  0-10 (Numeric) Pain Score: 3  Pain Type: Chronic pain  Pain Location: Back  Pain Orientation: Lower    Objective     Lumbar AROM (%)     Flexion: no limitations  Extension: limited by approx 25%  (L) Side Bend: no limitations  (R) Side Bend: no limitations  (L) Rotation: no limitations  (R) Rotation: no limitations                                                         Strength Testing     Core/Abdominals: 4/5      Hip                             (R)                    (L)  Flexion:            5/5                 5/5                               Extension:        4-/5                  4-/5                     Abduction:       4-/5                 4-/5                      ION: 44%    Treatments:    Therapeutic Exercise  Therapeutic Exercise Performed: Yes  Therapeutic  Exercise Activity 1: physio step x6 min as warm up  Therapeutic Exercise Activity 3: penguin waddles 2x20  Therapeutic Exercise Activity 4: alt deadbug 2x10 ea side  Therapeutic Exercise Activity 5: vuong hip ext 2x10 w 2# weight  Therapeutic Exercise Activity 6: vuong knee flex 2x10 w 2# weight  Therapeutic Exercise Activity 7: vuong back ext  Therapeutic Exercise Activity 8: recheck  Therapeutic Exercise Activity 9: HEP as below     EDUCATION:   Individual(s) Educated: Patient  Education Provided: POC and HEP  Handout(s) Provided: Scanned into chart  Home Program:    Access Code: [email or web address]  URL: [email or web address]  Date: 12/20/2024  Prepared by: Lorena Agosto    Exercises  - Seated Thoracic Extension with Hands Behind Neck  - 1-2 x daily - 7 x weekly - 2 sets - 10 reps  - Standing 3-way Hip with Walker  - 1-2 x daily - 7 x weekly - 2 sets - 10 reps  - Supine Dead Bug with Leg Extension  - 1-2 x daily - 7 x weekly - 2 sets - 10 reps  - Supine Bridge  - 1-2 x daily - 7 x weekly - 2 sets - 10 reps  - Single Leg Stance with Support  - 1-2 x daily - 7 x weekly - 2-3 reps - 30 sec hold    Risk and Benefits Discussed with Patient/Caregiver/Other: Yes   Patient/Caregiver Demonstrated Understanding: Yes   Patient Response to Education: Patient/Caregiver verbalized understanding of information, Patient/Caregiver performed return demonstration of exercises/activities, and Patient/Caregiver asked appropriate questions    Assessment:   Pt has made good progress since beginning PT. Cont to have pain and balance deficits w ALDs but does feel PT is helping him. Pt also cont to demo strength deficits as above and requires cont skilled PT intervention to address.       Plan: Continue with POC. Ext as below and HEP updated to cont w POC  OP PT Plan  Treatment/Interventions: Education/ Instruction, Manual therapy, Neuromuscular re-education, Therapeutic activities, Therapeutic exercises  PT Plan:  Skilled PT  PT Frequency: 2 times per week  Duration: 4 weeks  Onset Date: 11/19/04  Certification Period Start Date: 12/20/24  Certification Period End Date: 01/17/25  Number of Treatments Authorized: 8/8  Rehab Potential: Good  Plan of Care Agreement: Patient    Goals:  Active       PT Problem       Pt will improve core strength by 1 grade or more as evidence of improved functional mobility.   (Progressing)       Start:  11/19/24    Expected End:  01/17/25            Pt will improve lauren hip ext/abd strength by 1 grade or more as evidence of improved functional mobility.   (Progressing)       Start:  11/19/24    Expected End:  01/17/25            Pt will be indep and compliant in administering HEP  (Met)       Start:  11/19/24    Expected End:  12/17/24    Resolved:  12/20/24         Pt will report decreased pain to no more than </=3/10.  (Met)       Start:  11/19/24    Expected End:  12/17/24    Resolved:  12/20/24         Pt will improve ION score to <38% as evidence of improved functional ability.  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

## 2024-12-24 ENCOUNTER — APPOINTMENT (OUTPATIENT)
Dept: PHYSICAL THERAPY | Facility: HOSPITAL | Age: 74
End: 2024-12-24
Payer: MEDICARE

## 2024-12-24 DIAGNOSIS — M54.9 BACK PAIN: ICD-10-CM

## 2024-12-24 PROCEDURE — 97110 THERAPEUTIC EXERCISES: CPT | Mod: GP

## 2024-12-24 PROCEDURE — 97112 NEUROMUSCULAR REEDUCATION: CPT | Mod: GP

## 2024-12-24 ASSESSMENT — PAIN - FUNCTIONAL ASSESSMENT: PAIN_FUNCTIONAL_ASSESSMENT: 0-10

## 2024-12-24 ASSESSMENT — PAIN SCALES - GENERAL: PAINLEVEL_OUTOF10: 5 - MODERATE PAIN

## 2024-12-24 NOTE — PROGRESS NOTES
"  Physical Therapy Treatment    Patient Name: Vincent Ramirez  MRN: [id number]  Today's Date: 12/24/2024  Payor: MEDICARE / Plan: MEDICARE PART A AND B / Product Type: *No Product type* /   Time Calculation  Start Time: 0942  Stop Time: 1023  Time Calculation (min): 41 min        PT Therapeutic Procedures Time Entry  Neuromuscular Re-Education Time Entry: 21  Therapeutic Exercise Time Entry: 20        Current Problem  1. Back pain  Follow Up In Physical Therapy        Problem List Items Addressed This Visit    None  Visit Diagnoses         Codes    Back pain     M54.9            General  Reason for Referral: LBP  Referred By: Carlene SOTO CNP  Past Medical History Relevant to Rehab: non hodgkins lymphoma, RA, arthritis  Subjective   Current Condition:   Same  Patient reports he is having increased pain in his R side this date. States the HEP is going well but he \"can feel it.\"    Performing HEP?: Yes    Precautions  Precautions  Medical Precautions: No known precautions/limitation  Pain  Pain Assessment: 0-10  0-10 (Numeric) Pain Score: 5 - Moderate pain  Pain Type: Chronic pain  Pain Location: Back  Pain Orientation: Right    Objective     Treatments:    Therapeutic Exercise  Therapeutic Exercise Performed: Yes  Therapeutic Exercise Activity 1: physio step x7 min as warm up  Therapeutic Exercise Activity 2: monster walks 20 ft x4 fwd/bwd/lateral w GTB. intermittent minor LOBs but pt able to recover  Therapeutic Exercise Activity 3: cat/cow 2x10  Therapeutic Exercise Activity 4: bird dogs 2x10    Balance/Neuromuscular Re-Education  Balance/Neuromuscular Re-Education Activity 1: sliders in a [place] x10 ea direction ea side, focus on improving single leg stance and balance  Balance/Neuromuscular Re-Education Activity 2: star stepping 1x10 ea side  Balance/Neuromuscular Re-Education Activity 3: 4 square stepping x5 ea direction  Balance/Neuromuscular Re-Education Activity 4: SLS w BUE on neuro pole. 6x till failure ea " side, able to get to 27 sec on L side, only able to get to 12 sec on R  Balance/Neuromuscular Re-Education Activity 5: tandem amb x6 in // bars, intermittent use of // bars for steadying. fairly unsteady throughout.     EDUCATION:   Individual(s) Educated: Patient  Education Provided: goal of interventions  Handout(s) Provided: Scanned into chart  Home Program: same as previous  Risk and Benefits Discussed with Patient/Caregiver/Other: Yes   Patient/Caregiver Demonstrated Understanding: Yes   Patient Response to Education: Patient/Caregiver verbalized understanding of information, Patient/Caregiver performed return demonstration of exercises/activities, and Patient/Caregiver asked appropriate questions    Assessment:   Pt tolerating session well. New exercises introduced w fair return noted. Pt demo fair improvement in balance this date but was wearing new shoes which he stated feel much better on his feet. Pt cont to demo hip and core weakness as well as balance deficits. Pt cont to demo deficits as stated, and requires cont skilled PT intervention to assist pt in returning to PLOF.        Plan: Continue with POC. Jerry hip and core strengthening w ext based there-ex.   OP PT Plan  Treatment/Interventions: Education/ Instruction, Manual therapy, Neuromuscular re-education, Therapeutic activities, Therapeutic exercises  PT Plan: Skilled PT  PT Frequency: 2 times per week  Duration: 4 weeks  Onset Date: 11/19/04  Certification Period Start Date: 12/20/24  Certification Period End Date: 01/17/25  Number of Treatments Authorized: 1/8  Rehab Potential: Good  Plan of Care Agreement: Patient    Goals:  Active       PT Problem       Pt will improve core strength by 1 grade or more as evidence of improved functional mobility.   (Progressing)       Start:  11/19/24    Expected End:  01/17/25            Pt will improve jerry hip ext/abd strength by 1 grade or more as evidence of improved functional mobility.   (Progressing)        Start:  11/19/24    Expected End:  01/17/25            Pt will be indep and compliant in administering HEP  (Met)       Start:  11/19/24    Expected End:  12/17/24    Resolved:  12/20/24         Pt will report decreased pain to no more than </=3/10.  (Met)       Start:  11/19/24    Expected End:  12/17/24    Resolved:  12/20/24         Pt will improve ION score to <38% as evidence of improved functional ability.  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

## 2024-12-27 ENCOUNTER — APPOINTMENT (OUTPATIENT)
Dept: PHYSICAL THERAPY | Facility: HOSPITAL | Age: 74
End: 2024-12-27
Payer: MEDICARE

## 2024-12-27 DIAGNOSIS — M54.9 BACK PAIN: ICD-10-CM

## 2024-12-27 PROCEDURE — 97112 NEUROMUSCULAR REEDUCATION: CPT | Mod: GP

## 2024-12-27 PROCEDURE — 97110 THERAPEUTIC EXERCISES: CPT | Mod: GP

## 2024-12-27 ASSESSMENT — PAIN - FUNCTIONAL ASSESSMENT: PAIN_FUNCTIONAL_ASSESSMENT: 0-10

## 2024-12-27 ASSESSMENT — PAIN SCALES - GENERAL: PAINLEVEL_OUTOF10: 4

## 2024-12-27 NOTE — PROGRESS NOTES
Physical Therapy Treatment    Patient Name: Vincent Ramirez  MRN: [id number]  Today's Date: 12/27/2024  Payor: MEDICARE / Plan: MEDICARE PART A AND B / Product Type: *No Product type* /   Time Calculation  Start Time: 0943  Stop Time: 1026  Time Calculation (min): 43 min        PT Therapeutic Procedures Time Entry  Neuromuscular Re-Education Time Entry: 20  Therapeutic Exercise Time Entry: 23     Current Problem  1. Back pain  Follow Up In Physical Therapy        Problem List Items Addressed This Visit    None  Visit Diagnoses         Codes    Back pain     M54.9            General  Reason for Referral: LBP  Referred By: Carlene SOTO CNP  Past Medical History Relevant to Rehab: non hodgkins lymphoma, RA, arthritis    Subjective   Current Condition:   Better  Patient reports he is having hip pain when laying in bed. States he did not feel well yesterday and cannot pinpoint cause or start of the hip pain. Also states he was unable to get into his shop d/t the weather.    Performing HEP?: Yes    Precautions  Precautions  Medical Precautions: No known precautions/limitation  Pain  Pain Assessment: 0-10  0-10 (Numeric) Pain Score: 4  Pain Type: Acute pain  Pain Location: Hip  Pain Orientation: Right, Left    Objective     Treatments:    Therapeutic Exercise  Therapeutic Exercise Performed: Yes  Therapeutic Exercise Activity 1: physio step x7 min as warm up  Therapeutic Exercise Activity 3: cat/cow 2x10  Therapeutic Exercise Activity 4: 1x20 LTRs (small ranged/t cont pain w twisting)  Therapeutic Exercise Activity 5: single leg bridges 2x10  Therapeutic Exercise Activity 6: supine SLRs 2x10  Therapeutic Exercise Activity 7: 2x10 clamshells w GTB    Balance/Neuromuscular Re-Education  Balance/Neuromuscular Re-Education Activity 1: ladder passes: fwd x2, lateral ea side x2, in outs fwd x2, in/outs lateral x2 (mult intermittent LOBs requiring min A to complete.)  Balance/Neuromuscular Re-Education Activity 2: attempted  balance on alberto disk but pt having too much difficulty  Balance/Neuromuscular Re-Education Activity 3: 2x30 sec NBOS on airex pad  Balance/Neuromuscular Re-Education Activity 4: 2x30 sec rhomberg stance on airex pad     EDUCATION:   Individual(s) Educated: Patient  Education Provided: goal of interventions  Handout(s) Provided: Scanned into chart  Home Program: same as previous  Risk and Benefits Discussed with Patient/Caregiver/Other: Yes   Patient/Caregiver Demonstrated Understanding: Yes   Patient Response to Education: Patient/Caregiver verbalized understanding of information, Patient/Caregiver performed return demonstration of exercises/activities, and Patient/Caregiver asked appropriate questions    Assessment:   Pt tolerating session well, no increase in pain at end of session. Slightly worse performance in balance interventions this date, especially the dynamic portions. Cont to report pain w ADLs and poor hip/core strength. Pt requires cont skilled PT interventions to address deficits as stated and return to ADLs w/o pain.       Plan: Continue with POC. Balance and hip/core strengthening, w trunk ext based there-ex as pt tolerates.  OP PT Plan  Treatment/Interventions: Education/ Instruction, Manual therapy, Neuromuscular re-education, Therapeutic activities, Therapeutic exercises  PT Plan: Skilled PT  PT Frequency: 2 times per week  Duration: 4 weeks  Onset Date: 11/19/04  Certification Period Start Date: 12/20/24  Certification Period End Date: 01/17/25  Number of Treatments Authorized: 2/8  Rehab Potential: Good  Plan of Care Agreement: Patient    Goals:  Active       PT Problem       Pt will improve core strength by 1 grade or more as evidence of improved functional mobility.   (Progressing)       Start:  11/19/24    Expected End:  01/17/25            Pt will improve lauren hip ext/abd strength by 1 grade or more as evidence of improved functional mobility.   (Progressing)       Start:  11/19/24     Expected End:  01/17/25            Pt will be indep and compliant in administering HEP  (Met)       Start:  11/19/24    Expected End:  12/17/24    Resolved:  12/20/24         Pt will report decreased pain to no more than </=3/10.  (Met)       Start:  11/19/24    Expected End:  12/17/24    Resolved:  12/20/24         Pt will improve ION score to <38% as evidence of improved functional ability.  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

## 2024-12-31 ENCOUNTER — TREATMENT (OUTPATIENT)
Dept: PHYSICAL THERAPY | Facility: HOSPITAL | Age: 74
End: 2024-12-31
Payer: MEDICARE

## 2024-12-31 DIAGNOSIS — M54.9 BACK PAIN: ICD-10-CM

## 2024-12-31 PROCEDURE — 97112 NEUROMUSCULAR REEDUCATION: CPT | Mod: GP

## 2024-12-31 PROCEDURE — 97110 THERAPEUTIC EXERCISES: CPT | Mod: GP

## 2024-12-31 ASSESSMENT — PAIN SCALES - GENERAL: PAINLEVEL_OUTOF10: 0 - NO PAIN

## 2024-12-31 ASSESSMENT — PAIN - FUNCTIONAL ASSESSMENT: PAIN_FUNCTIONAL_ASSESSMENT: 0-10

## 2024-12-31 NOTE — PROGRESS NOTES
Physical Therapy Treatment    Patient Name: Vincent Ramirez  MRN: [id number]  Today's Date: 12/31/2024  Payor: MEDICARE / Plan: MEDICARE PART A AND B / Product Type: *No Product type* /   Time Calculation  Start Time: 0855  Stop Time: 0935  Time Calculation (min): 40 min        PT Therapeutic Procedures Time Entry  Neuromuscular Re-Education Time Entry: 10  Therapeutic Exercise Time Entry: 30        Current Problem  1. Back pain  Follow Up In Physical Therapy        Problem List Items Addressed This Visit    None  Visit Diagnoses         Codes    Back pain     M54.9            General  Reason for Referral: LBP  Referred By: Carlene SOTO CNP  Past Medical History Relevant to Rehab: non hodgkins lymphoma, RA, arthritis    Subjective   Current Condition:   Better  Patient reports he is still having that hip pain when he lays on his side in bed. Also stating he is still not feeling too well. States he was able to get into the shop and things went well. States he felt a little weak and slightly off balance but much better compared to before.    Performing HEP?: Yes    Precautions  Precautions  Medical Precautions: No known precautions/limitation  Pain  Pain Assessment: 0-10  0-10 (Numeric) Pain Score: 0 - No pain    Objective     Treatments:    Therapeutic Exercise  Therapeutic Exercise Performed: Yes  Therapeutic Exercise Activity 1: physio step x7 min as warm up  Therapeutic Exercise Activity 2: 2x10 Tobin hip ext w 2# weight  Therapeutic Exercise Activity 3: 2x10 Tobin knee flex w 2# weight  Therapeutic Exercise Activity 4: 3x10 Tobin back ext w 4# weight  Therapeutic Exercise Activity 5: 3x10 supine leg extenders focusing on core  Therapeutic Exercise Activity 6: 2x10 mini crunches reaching for ceiling  Therapeutic Exercise Activity 7: 2x30 sec plank on knees w arms ext    Balance/Neuromuscular Re-Education  Balance/Neuromuscular Re-Education Activity 2: NBOS on airex pad tapping all 4 corners of pad,  2x20  Balance/Neuromuscular Re-Education Activity 3: On airex pad pushing towel w pole, 1x10 w WBOS, 1x10 w NBOS  Balance/Neuromuscular Re-Education Activity 4: fwd tandem walking in // bars x3  Balance/Neuromuscular Re-Education Activity 5: bwd tandem walking in // bars x3     EDUCATION:   Individual(s) Educated: Patient  Education Provided: goal of interventions  Handout(s) Provided: Scanned into chart  Home Program: same as previous  Risk and Benefits Discussed with Patient/Caregiver/Other: Yes   Patient/Caregiver Demonstrated Understanding: Yes   Patient Response to Education: Patient/Caregiver verbalized understanding of information, Patient/Caregiver performed return demonstration of exercises/activities, and Patient/Caregiver asked appropriate questions    Assessment:   Pt tolerating session well. Increased focus on strengthening in both hips and core this date. Pt denying any increase in pain at end of session. Pt given intermittent rest breaks as needed. Pt cont to demo hip and core weakness w balance deficits. Pt cont to demo deficits as stated, and requires cont skilled PT intervention to assist pt in returning to PLOF.        Plan: Continue with POC. Jerry hip and core strengthening w balance   OP PT Plan  Treatment/Interventions: Education/ Instruction, Manual therapy, Neuromuscular re-education, Therapeutic activities, Therapeutic exercises  PT Plan: Skilled PT  PT Frequency: 2 times per week  Duration: 4 weeks  Onset Date: 11/19/04  Certification Period Start Date: 12/20/24  Certification Period End Date: 01/17/25  Number of Treatments Authorized: 3/8  Rehab Potential: Good  Plan of Care Agreement: Patient    Goals:  Active       PT Problem       Pt will improve core strength by 1 grade or more as evidence of improved functional mobility.   (Progressing)       Start:  11/19/24    Expected End:  01/17/25            Pt will improve jerry hip ext/abd strength by 1 grade or more as evidence of improved  functional mobility.   (Progressing)       Start:  11/19/24    Expected End:  01/17/25            Pt will be indep and compliant in administering HEP  (Met)       Start:  11/19/24    Expected End:  12/17/24    Resolved:  12/20/24         Pt will report decreased pain to no more than </=3/10.  (Met)       Start:  11/19/24    Expected End:  12/17/24    Resolved:  12/20/24         Pt will improve ION score to <38% as evidence of improved functional ability.  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

## 2025-01-03 ENCOUNTER — DOCUMENTATION (OUTPATIENT)
Dept: PHYSICAL THERAPY | Facility: HOSPITAL | Age: 75
End: 2025-01-03
Payer: MEDICARE

## 2025-01-03 ENCOUNTER — APPOINTMENT (OUTPATIENT)
Dept: PHYSICAL THERAPY | Facility: HOSPITAL | Age: 75
End: 2025-01-03
Payer: MEDICARE

## 2025-01-06 DIAGNOSIS — N40.0 BENIGN PROSTATIC HYPERPLASIA, UNSPECIFIED WHETHER LOWER URINARY TRACT SYMPTOMS PRESENT: ICD-10-CM

## 2025-01-06 DIAGNOSIS — G47.9 SLEEP DISORDER, UNSPECIFIED: ICD-10-CM

## 2025-01-06 RX ORDER — TRAZODONE HYDROCHLORIDE 100 MG/1
100 TABLET ORAL NIGHTLY
Qty: 90 TABLET | Refills: 1 | Status: SHIPPED | OUTPATIENT
Start: 2025-01-06 | End: 2025-07-05

## 2025-01-06 RX ORDER — OXYBUTYNIN CHLORIDE 5 MG/1
5 TABLET ORAL 2 TIMES DAILY
Qty: 180 TABLET | Refills: 0 | Status: SHIPPED | OUTPATIENT
Start: 2025-01-06

## 2025-01-07 ENCOUNTER — TREATMENT (OUTPATIENT)
Dept: PHYSICAL THERAPY | Facility: HOSPITAL | Age: 75
End: 2025-01-07
Payer: MEDICARE

## 2025-01-07 DIAGNOSIS — M54.9 BACK PAIN: ICD-10-CM

## 2025-01-07 PROCEDURE — 97110 THERAPEUTIC EXERCISES: CPT | Mod: GP

## 2025-01-07 PROCEDURE — 97112 NEUROMUSCULAR REEDUCATION: CPT | Mod: GP

## 2025-01-07 ASSESSMENT — PAIN - FUNCTIONAL ASSESSMENT: PAIN_FUNCTIONAL_ASSESSMENT: 0-10

## 2025-01-07 ASSESSMENT — PAIN SCALES - GENERAL: PAINLEVEL_OUTOF10: 4

## 2025-01-07 NOTE — PROGRESS NOTES
"  Physical Therapy Treatment    Patient Name: Vincent Ramirez  MRN: [id number]  Today's Date: 1/7/2025  Payor: MEDICARE / Plan: MEDICARE PART A AND B / Product Type: *No Product type* /   Time Calculation  Start Time: 0947  Stop Time: 1025  Time Calculation (min): 38 min        PT Therapeutic Procedures Time Entry  Neuromuscular Re-Education Time Entry: 18  Therapeutic Exercise Time Entry: 20        Current Problem  1. Back pain  Follow Up In Physical Therapy        Problem List Items Addressed This Visit    None  Visit Diagnoses         Codes    Back pain     M54.9            General  Reason for Referral: LBP  Referred By: Carlene SOTO CNP  Past Medical History Relevant to Rehab: non hodgkins lymphoma, RA, arthritis  Subjective   Current Condition:   Better  Patient reports he is still having his R hip, but feels as though that is just \"old age\" and a progression of arthritis. Does state his back is feeling better and more \"limber.\"    Performing HEP?: Yes    Precautions  Precautions  Medical Precautions: No known precautions/limitation  Pain  Pain Assessment: 0-10  0-10 (Numeric) Pain Score: 4  Pain Type: Chronic pain  Pain Location: Hip  Pain Orientation: Right    Objective     Treatments:    Therapeutic Exercise  Therapeutic Exercise Performed: Yes  Therapeutic Exercise Activity 1: physio step x6 min as warm up  Therapeutic Exercise Activity 2: cat/cow 2x10 (ext to neutral)  Therapeutic Exercise Activity 3: bird dogs 2x10  Therapeutic Exercise Activity 7: 2x30 sec plank on knees w arms ext  Therapeutic Exercise Activity 8: supine piriformis stretch 2x30 sec  Therapeutic Exercise Activity 9: 2x10 mini squats on corner of mat table, w 6# MB    Balance/Neuromuscular Re-Education  Balance/Neuromuscular Re-Education Activity 1: holding low squat position on mat on floor. 2x30 sec.minor sway but no major LOB  Balance/Neuromuscular Re-Education Activity 2: low squat on mat while moving various weight objects in front " to minimic performing his work in his garage. 1x3 min Several minor anterior LOBs but pt able to recover w hands.  Balance/Neuromuscular Re-Education Activity 3: 2x30 sec low squat on airex pad. increased sway noted throughout, able to recover w // bars for minor LOBs. LOBs increasing w cont practice d/t fatigue of position  Balance/Neuromuscular Re-Education Activity 4: 1x10 [place] switches of 2# MB on low squat position on airex pad, several lateral LOBs w pt able to recover by self.    EDUCATION:   Individual(s) Educated: Patient  Education Provided: goal of interventions  Handout(s) Provided: Scanned into chart  Home Program: same as previous  Risk and Benefits Discussed with Patient/Caregiver/Other: Yes   Patient/Caregiver Demonstrated Understanding: Yes   Patient Response to Education: Patient/Caregiver verbalized understanding of information, Patient/Caregiver performed return demonstration of exercises/activities, and Patient/Caregiver asked appropriate questions    Assessment:   Pt progressed w practicing functional balance in his most difficult positions, including a low squat for his workshop at home. Pt continued w hip and core strengthening/mobility. Will cont to practice functional positions and mobility within those positions. Pt cont to demo deficits as stated, and requires cont skilled PT intervention to assist pt in returning to PLOF.        Plan: Continue with POC. Cont w trunk ext, BLE strength, and core strengthening.   OP PT Plan  Treatment/Interventions: Education/ Instruction, Manual therapy, Neuromuscular re-education, Therapeutic activities, Therapeutic exercises  PT Plan: Skilled PT  PT Frequency: 2 times per week  Duration: 4 weeks  Onset Date: 11/19/04  Certification Period Start Date: 12/20/24  Certification Period End Date: 01/17/25  Number of Treatments Authorized: 4/8  Rehab Potential: Good  Plan of Care Agreement: Patient    Goals:  Active       PT Problem       Pt will improve core  strength by 1 grade or more as evidence of improved functional mobility.   (Progressing)       Start:  11/19/24    Expected End:  01/17/25            Pt will improve lauren hip ext/abd strength by 1 grade or more as evidence of improved functional mobility.   (Progressing)       Start:  11/19/24    Expected End:  01/17/25            Pt will be indep and compliant in administering HEP  (Met)       Start:  11/19/24    Expected End:  12/17/24    Resolved:  12/20/24         Pt will report decreased pain to no more than </=3/10.  (Met)       Start:  11/19/24    Expected End:  12/17/24    Resolved:  12/20/24         Pt will improve ION score to <38% as evidence of improved functional ability.  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

## 2025-01-09 ENCOUNTER — DOCUMENTATION (OUTPATIENT)
Dept: PHYSICAL THERAPY | Facility: HOSPITAL | Age: 75
End: 2025-01-09
Payer: MEDICARE

## 2025-01-09 NOTE — PROGRESS NOTES
"Physical Therapy                 Therapy Communication Note    Patient Name: Vincent Ramirez \"Car\"  MRN: [id number]  Department:   Room: Room/bed info not found  Today's Date: 1/9/2025     Discipline: Physical Therapy    Missed Time: No Show    Comment: Pt did not show to scheduled mark. PSR called and pt thought his mark was tomorrow. Pt scheduled again for 1/13/2025.  "

## 2025-01-10 ENCOUNTER — OFFICE VISIT (OUTPATIENT)
Dept: OTOLARYNGOLOGY | Facility: CLINIC | Age: 75
End: 2025-01-10
Payer: MEDICARE

## 2025-01-10 DIAGNOSIS — H65.92 OTITIS MEDIA WITH EFFUSION, LEFT: Primary | ICD-10-CM

## 2025-01-10 PROCEDURE — 1123F ACP DISCUSS/DSCN MKR DOCD: CPT | Performed by: OTOLARYNGOLOGY

## 2025-01-10 PROCEDURE — 1157F ADVNC CARE PLAN IN RCRD: CPT | Performed by: OTOLARYNGOLOGY

## 2025-01-10 PROCEDURE — 99213 OFFICE O/P EST LOW 20 MIN: CPT | Performed by: OTOLARYNGOLOGY

## 2025-01-10 PROCEDURE — 1159F MED LIST DOCD IN RCRD: CPT | Performed by: OTOLARYNGOLOGY

## 2025-01-10 PROCEDURE — 69420 INCISION OF EARDRUM: CPT | Performed by: OTOLARYNGOLOGY

## 2025-01-10 RX ORDER — FLUTICASONE PROPIONATE 50 MCG
2 SPRAY, SUSPENSION (ML) NASAL DAILY
Qty: 16 G | Refills: 1 | Status: SHIPPED | OUTPATIENT
Start: 2025-01-10

## 2025-01-10 RX ORDER — DOXYCYCLINE HYCLATE 100 MG
100 TABLET ORAL 2 TIMES DAILY
Qty: 20 TABLET | Refills: 0 | Status: SHIPPED | OUTPATIENT
Start: 2025-01-10 | End: 2025-01-20

## 2025-01-10 NOTE — PROGRESS NOTES
"   Chief Complaint   \"Left ear full\"      History of Present Illness    01.10.2025: He feels fullness in his left ear for the past 3 weeks or so. On examination, patient has left otitis media with effusion. He had a similar episode about 3 years ago and was treated with left myringotomy. History of multiple childhood ear infections, which went away after the age of 13. On examination, nasal septum is deviated to left.     Plan  1-follow-up in 1 month  ______________________________________________________________________    05.16.2022: He comes for follow up. Left ear feels much better. On examination left TM intact, no visible effusion. Secondly he has off and on tickling cough coming from his throat.   He had nasal surgery and UPPP in the past.     On examination, there was yellowish sticky purulent thick discharge at oropharynx  Inferior turbinates look moderately congested.     Topical sterid and antiseptic was applied to his throat.     Recommendation:  1- oral doxycycline  3- chlorhexidine mouth wash  3- follow up as needed   _______________________________________________________________________________________________      04.25.2022: Mr. Ramirez is a 72 yo M. He has fullness in left ear since the beginning of this year.  History of ear problems in childhood.     On examination, effusion (+) at left. Right TM looks essentially normal  Nasal mucosa looks pale. Nasal septum deviated to left.  UPPP (+)     Left myringotomy was done in the office.     Plan:  1- follow up in 3 weeks      Review of Systems  Below is a copy of his initial ROS, he does not have fever today.     Constitutional: feeling tired and recent ~Ulb weight gain, but no fever and no recent weight loss.   Eyes: no eye pain, no double vision and no itching of the eyes.   ENMT: difficulty with hearing, hearing loss, the ears feel full and postnasal drip, but no pain in the ear, no vertigo, no tinnitus, no discharge from the ears, no nosebleeds, no " nasal congestion, no rhinorrhea, no sinus pressure, no nasal blockage/obstruction, no snoring, no sore throat, no hoarseness, the gums were not bleeding, no dry mouth, no dysphagia and no mouth ulcers.   Cardiovascular: no history of murmur, no chest pain, no palpitations and no lower extremity edema.   Respiratory: shortness of breath and dyspnea during exertion, but no wheezing, not coughing up sputum and not coughing up blood.   Gastrointestinal: no heartburn, no vomiting, no change in appetite and no pain on swallowing.   Genitourinary: no dysuria and no difficulty urinating.   Musculoskeletal: arthralgias and joint stiffness, but no myalgias.   Integumentary: dry skin, but no rashes, no skin lesions, no itching and no unusual growth on the skin.   Neurological: no fainting, no headache, no numbness, no convulsions and no weakness.   Psychiatric: depression, but no anxiety.   Endocrine: no increased thirst.   Hematologic/Lymphatic: no swollen glands, no tendency for easy bleeding and no tendency for easy bruising.   All other systems have been reviewed and are negative for complaint.        Physical Exam (initial exam note)  General appearance: Healthy-appearing, well-nourished, well groomed, in no acute distress.      Head and Face: Atraumatic with no masses, lesions, or scarring.      Salivary glands: No tenderness of the parotid glands or parotid masses. (old exam)     No tenderness of the submandibular glands or submandibular masses. (old exam)     Facial strength: Normal strength and symmetry, no synkinesis or facial tic.      Eyes: Conjunctivas look non-hyperemic bilaterally     Ears: Bilaterally ear canals look normal. Tympanic membranes intact, (+) fluid at left. (old exam)     Nose: Nasal mucosa looks pale. Nasal septum deviated to left. (old exam)     Oral Cavity/Mouth: Lips and tongue look normal. (old exam)     Throat: UPPP (+) (old exam)     Neck: Symmetrical, trachea midline. (old exam)      Pulmonary: Normal respiratory effort.      Lymphatic: No palpable pathologic lymph nodes at neck.      Neurological/Psychiatric: Orientation to person, place, and time: Normal.   Mood and affect: Normal.      Extremities: No clubbing.        Procedure    LEFT MYRINGOTOMY: 01.10.2025  Operative microscope was brought to patient's left ear. On examination, left tympanic membrane was intact. Topical phenol was applied inferiorly to left tympanic membrane. Left myringotomy was done using 2 inches long 21g needle, there was effusion, suction cleaning was done. Ofloxacin drops were applied.     Procedure was concluded.  _________________________________________________________________    LEFT MYRINGOTOMY: 04.25.2022  Operative microscope was brought to patient's left ear. On examination, left tympanic membrane was intact. Topical phenol was applied posterosuperiorly to left tympanic membrane. Left myringotomy was done using 2 inches long 25g needle, some of the effusion was suctioned, some of it remained. Ofloxacin drops were applied.     Procedure was concluded.  _________________________________________________________________     FIBEROPTIC NASOPHARYNGOSCOPY 05.16.2022  Nasal mucosa looks pale and there is coblesotoning of mucosa. There was no nasal discharge, but there was purulent discharge at nasopharynx on fiberoptic examination.         Patient Discussion/Summary     01.10.2025: He feels fullness in his left ear for the past 3 weeks or so. On examination, patient has left otitis media with effusion. He had a similar episode 3 years ago and was treated with left myringotomy. History of multiple childhood ear infections which went away after the age of 13. On examination, mnasal septum devited to left Vincent Ramirez is a 74 y.o. year old male patient with   referred for  .  Patient says he does not have allergies, but nasal mucosa looks plae and there is coblesotoning of mucosa. There was no nasal dischare but  there was puulnent discharge at nasopharynx on fiberoptic examination.    Plan  1-follow-up in 1 month  2-fluticasone nasal spray  3-oral doxycycline for purulent material at nasopharynx was  ______________________________________________________________________    05.16.2022: He comes for follow up. Left ear feels much better. On examination left TM intact, no visible effusion. Secondly he has off and on tickling cough coming from his throat.   He had nasal surgery and UPPP in the past.     On examination, there was yellowish sticky purulent thick discharge at oropharynx.  Inferior turbinates look moderately congested. No nasopharyngeal mass on endoscopy.     Topical sterid and antiseptic was applied to his throat.     Recommendation:  1- oral doxycycline  3- chlorhexidine mouth wash  3- follow up as needed   _______________________________________________________________________________________________      04.25.2022: Mr. Ramirez is a 72 yo M. He has fullness in left ear since the beginning of this year.  History of ear problems in childhood.     On examination, effusion (+) at left. Right TM looks essentially normal  Nasal mucosa looks pale. Nasal septum deviated to left.  UPPP (+)     Left myringotomy was done in the office.     Plan:  1- follow up in 3 weeks

## 2025-01-13 ENCOUNTER — TREATMENT (OUTPATIENT)
Dept: PHYSICAL THERAPY | Facility: HOSPITAL | Age: 75
End: 2025-01-13
Payer: MEDICARE

## 2025-01-13 DIAGNOSIS — M54.9 BACK PAIN: ICD-10-CM

## 2025-01-13 PROCEDURE — 97110 THERAPEUTIC EXERCISES: CPT | Mod: GP,CQ

## 2025-01-13 ASSESSMENT — PAIN - FUNCTIONAL ASSESSMENT: PAIN_FUNCTIONAL_ASSESSMENT: 0-10

## 2025-01-13 ASSESSMENT — PAIN SCALES - GENERAL: PAINLEVEL_OUTOF10: 4

## 2025-01-13 NOTE — PROGRESS NOTES
Physical Therapy Treatment    Patient Name: Vincent Ramirez  MRN: [id number]  Encounter Date: 1/13/2025  Time Calculation  Start Time: 1430  Stop Time: 1513  Time Calculation (min): 43 min        PT Therapeutic Procedures Time Entry  Therapeutic Exercise Time Entry: 43        Current Problem  Problem List Items Addressed This Visit    None  Visit Diagnoses         Codes    Back pain     M54.9          1. Back pain  Follow Up In Physical Therapy          General  Reason for Referral: LBP  Referred By: Carlene SOTO CNP  Past Medical History Relevant to Rehab: non hodgkins lymphoma, RA, arthritis  Preferred Learning Style: written  General Comment: Pt reports pain in low back 4/10 with pain mostly center in low back.  Pt reports most difficulty with standing for longer periods of time and squatting with dynamic tasks.    Subjective   Current Condition:   Better  Patient reports feeling better and stronger overall doing the therapy and the exercises at home.  Reports no pain shooting down the leg at this time.     Performing HEP?: Yes    Precautions  Precautions  Medical Precautions: No known precautions/limitation  Pain  Pain Assessment: 0-10  0-10 (Numeric) Pain Score: 4  Pain Type: Chronic pain  Pain Location: Back  Pain Orientation: Lower    Objective     Treatments:    Therapeutic Exercise  Therapeutic Exercise Performed: Yes  Therapeutic Exercise Activity 1: physio step x6 min as warm up  Therapeutic Exercise Activity 2: cat/cow 2x10 (ext to neutral)  Therapeutic Exercise Activity 3: bird dogs 2x10  Therapeutic Exercise Activity 4: 2x15 PPT  Therapeutic Exercise Activity 5: 2x20 reps hip abd machine #40  Therapeutic Exercise Activity 7: 2x30 sec plank on knees w arms ext  Therapeutic Exercise Activity 8: supine piriformis stretch 2x30 sec  Therapeutic Exercise Activity 9: 2x10 mini squats on corner of mat table, w 6# MB          EDUCATION:   Individual(s) Educated: Patient  Education Provided: Added PPT to  hep  Handout(s) Provided: Scanned into chart  Home Program: 3x10 reps   Risk and Benefits Discussed with Patient/Caregiver/Other: Yes   Patient/Caregiver Demonstrated Understanding: Yes   Patient Response to Education: Patient/Caregiver verbalized understanding of information, Patient/Caregiver performed return demonstration of exercises/activities, and Patient/Caregiver asked appropriate questions    Assessment: Pt required tc and vc for proper ppt execution this date to target proper weak muscles and aid in STG.  Pt demonstrated increased difficulty with core stability challenges in prone with significant lob during bird dog tasks with increased rest breaks needed due to fatigue.        Plan: Continue with POC. Continue with core stability, LE strengthening, ROM, flexibility, and balance, so the patient can perform FA's without increased pain or difficulty.  Cont to focus on core strength and posterior chain stability to restore posture and improve function.    OP PT Plan  Treatment/Interventions: Education/ Instruction, Manual therapy, Neuromuscular re-education, Therapeutic activities, Therapeutic exercises  PT Plan: Skilled PT  PT Frequency: 2 times per week  Duration: 4 weeks  Onset Date: 11/19/04  Certification Period Start Date: 12/20/24  Certification Period End Date: 01/17/25  Number of Treatments Authorized: 5/8  Rehab Potential: Good  Plan of Care Agreement: Patient  Payor: MEDICARE / Plan: MEDICARE PART A AND B / Product Type: *No Product type* /     Visit count this episode: 11 visits    Goals:  Active       PT Problem       Pt will improve core strength by 1 grade or more as evidence of improved functional mobility.   (Progressing)       Start:  11/19/24    Expected End:  01/17/25            Pt will improve lauren hip ext/abd strength by 1 grade or more as evidence of improved functional mobility.   (Progressing)       Start:  11/19/24    Expected End:  01/17/25            Pt will be indep and compliant  in administering HEP  (Met)       Start:  11/19/24    Expected End:  12/17/24    Resolved:  12/20/24         Pt will report decreased pain to no more than </=3/10.  (Met)       Start:  11/19/24    Expected End:  12/17/24    Resolved:  12/20/24         Pt will improve ION score to <38% as evidence of improved functional ability.  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

## 2025-01-16 DIAGNOSIS — F32.1 CURRENT MODERATE EPISODE OF MAJOR DEPRESSIVE DISORDER WITHOUT PRIOR EPISODE (MULTI): ICD-10-CM

## 2025-01-16 DIAGNOSIS — N40.0 BENIGN PROSTATIC HYPERPLASIA, UNSPECIFIED WHETHER LOWER URINARY TRACT SYMPTOMS PRESENT: ICD-10-CM

## 2025-01-16 DIAGNOSIS — C61 PROSTATE CANCER (MULTI): ICD-10-CM

## 2025-01-16 RX ORDER — SERTRALINE HYDROCHLORIDE 50 MG/1
50 TABLET, FILM COATED ORAL DAILY
Qty: 90 TABLET | Refills: 1 | Status: SHIPPED | OUTPATIENT
Start: 2025-01-16

## 2025-01-16 RX ORDER — ALFUZOSIN HYDROCHLORIDE 10 MG/1
10 TABLET, EXTENDED RELEASE ORAL DAILY
Qty: 90 TABLET | Refills: 1 | Status: SHIPPED | OUTPATIENT
Start: 2025-01-16

## 2025-01-16 NOTE — TELEPHONE ENCOUNTER
Paloma, seeing her next on 3-6, last psa 8.7 from 8-6, last cmp from 1-3-24 shows abnormals as bilirubin 1.4, glucose 104

## 2025-01-17 ENCOUNTER — TREATMENT (OUTPATIENT)
Dept: PHYSICAL THERAPY | Facility: HOSPITAL | Age: 75
End: 2025-01-17
Payer: MEDICARE

## 2025-01-17 DIAGNOSIS — M54.9 BACK PAIN: Primary | ICD-10-CM

## 2025-01-17 PROCEDURE — 97110 THERAPEUTIC EXERCISES: CPT | Mod: GP

## 2025-01-17 PROCEDURE — 97112 NEUROMUSCULAR REEDUCATION: CPT | Mod: GP

## 2025-01-17 ASSESSMENT — PAIN SCALES - GENERAL: PAINLEVEL_OUTOF10: 4

## 2025-01-17 ASSESSMENT — PAIN - FUNCTIONAL ASSESSMENT: PAIN_FUNCTIONAL_ASSESSMENT: 0-10

## 2025-01-17 NOTE — PROGRESS NOTES
Physical Therapy Treatment and Recheck    Patient Name: Vincent Ramirez  MRN: [id number]  Today's Date: 1/17/2025  Payor: MEDICARE / Plan: MEDICARE PART A AND B / Product Type: *No Product type* /   Time Calculation  Start Time: 0945  Stop Time: 1024  Time Calculation (min): 39 min        PT Therapeutic Procedures Time Entry  Neuromuscular Re-Education Time Entry: 15  Therapeutic Exercise Time Entry: 24        Current Problem  1. Back pain  Follow Up In Physical Therapy    Follow Up In Physical Therapy        Problem List Items Addressed This Visit    None  Visit Diagnoses         Codes    Back pain    -  Primary M54.9    Relevant Orders    Follow Up In Physical Therapy            General  Reason for Referral: LBP  Referred By: Carlene SOTO CNP  Past Medical History Relevant to Rehab: non hodgkins lymphoma, RA, arthritis  Subjective   Current Condition:   Better  Patient reports he thinks he overdid it w his exercises yesterday, states he did them too much. Does feel better overall. States he feels 45% better since beginning PT, which is more than the previously reported 35%. 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 before he needs to take a break, which is improved compared to prevoiously    Performing HEP?: Yes    Precautions  Precautions  Medical Precautions: No known precautions/limitation  Pain  Pain Assessment: 0-10  0-10 (Numeric) Pain Score: 4  Pain Type: Chronic pain  Pain Location: Back  Pain Orientation: Lower    Objective   Lumbar AROM (%)     Flexion: no limitations  Extension: limited by approx 25%  (L) Side Bend: no limitations  (R) Side Bend: no limitations  (L) Rotation: no limitations  (R) Rotation: no limitations                                                         Strength Testing     Core/Abdominals: 4/5      Hip                             (R)                    (L)  Flexion:            5/5                   5/5                               Extension:         4+/5                 4+/5                     Abduction:       4+/5                 4+/5            Tandem stance:  30 sec       Outcome Measures:  ION 44%    Treatments:    Therapeutic Exercise  Therapeutic Exercise Performed: Yes  Therapeutic Exercise Activity 1: physio step x6 min as warm up  Therapeutic Exercise Activity 2: recheck  Therapeutic Exercise Activity 3: HEP as below  Therapeutic Exercise Activity 4: 1x10 bird dogs    Balance/Neuromuscular Re-Education  Balance/Neuromuscular Re-Education Activity 1: HEP as below  Balance/Neuromuscular Re-Education Activity 2: 2x15, 1x25 sec low squat on airex pad. increased sway noted throughout, able to recover w // bars for minor LOBs. LOBs increasing w cont practice d/t fatigue of position  Balance/Neuromuscular Re-Education Activity 3: 1x10 OH switches of 2# MB on low squat position on airex pad, several lateral LOBs w pt able to recover by self.     EDUCATION:   Individual(s) Educated: Patient  Education Provided: HEP, POC  Handout(s) Provided: Scanned into chart  Home Program:     Access Code: [email or web address]  URL: [email or web address]  Date: 01/17/2025  Prepared by: Lorena Serrato    Exercises  - Prone Hip Extension  - 1-2 x daily - 7 x weekly - 3 sets - 10 reps  - Static Prone on Elbows  - 1-2 x daily - 7 x weekly - 2 sets - 1 min hold  - Half Kneeling Hip Flexor Stretch with Sidebend  - 1-2 x daily - 7 x weekly - 2 sets - 10 reps  - Tandem Stance  - 1-2 x daily - 7 x weekly - 2 sets - 30 sec hold  - Single Leg Balance Raising Opposite Arm and Leg  - 1-2 x daily - 7 x weekly - 2 sets - 10 reps - 2 sec hold    Risk and Benefits Discussed with Patient/Caregiver/Other: Yes   Patient/Caregiver Demonstrated Understanding: Yes   Patient Response to Education: Patient/Caregiver verbalized understanding of information, Patient/Caregiver performed return demonstration of exercises/activities, and Patient/Caregiver asked appropriate  questions    Assessment:   Pt tolerating session well. Good improvement in hip/core strength and pt reporting decreased pain overall.  Pt cont to have some limitations in spine ext ROM, as well as difficulties w balance. HEP updated to reflect pt progress/deficits. Pt to be ext as below to cont w progress. Goals adjusted as needed.       Plan: Continue with POC. Ext as below  OP PT Plan  Treatment/Interventions: Education/ Instruction, Manual therapy, Neuromuscular re-education, Therapeutic activities, Therapeutic exercises  PT Plan: Skilled PT  PT Frequency: 2 times per week  Duration: 4 weeks  Onset Date: 11/19/04  Certification Period Start Date: 01/17/25  Certification Period End Date: 02/14/25  Number of Treatments Authorized: 8/8  Rehab Potential: Good  Plan of Care Agreement: Patient    Goals:  Active       PT Problem       Pt will improve core strength by 1 grade or more as evidence of improved functional mobility.   (Progressing)       Start:  11/19/24    Expected End:  02/14/25            Pt will improve lauren hip ext/abd strength by 1 grade or more as evidence of improved functional mobility.   (Progressing)       Start:  11/19/24    Expected End:  02/14/25            Pt will be indep and compliant in administering HEP  (Met)       Start:  11/19/24    Expected End:  12/17/24    Resolved:  12/20/24         Pt will report decreased pain to no more than </=3/10.  (Met)       Start:  11/19/24    Expected End:  12/17/24    Resolved:  12/20/24         Pt will improve ION score to <38% as evidence of improved functional ability.  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

## 2025-01-21 ENCOUNTER — TREATMENT (OUTPATIENT)
Dept: PHYSICAL THERAPY | Facility: HOSPITAL | Age: 75
End: 2025-01-21
Payer: MEDICARE

## 2025-01-21 DIAGNOSIS — M54.9 BACK PAIN: ICD-10-CM

## 2025-01-21 PROCEDURE — 97110 THERAPEUTIC EXERCISES: CPT | Mod: GP

## 2025-01-21 PROCEDURE — 97112 NEUROMUSCULAR REEDUCATION: CPT | Mod: GP

## 2025-01-21 ASSESSMENT — PAIN SCALES - GENERAL: PAINLEVEL_OUTOF10: 4

## 2025-01-21 ASSESSMENT — PAIN - FUNCTIONAL ASSESSMENT: PAIN_FUNCTIONAL_ASSESSMENT: 0-10

## 2025-01-21 NOTE — PROGRESS NOTES
Physical Therapy Treatment    Patient Name: Vincent Ramirez  MRN: [id number]  Today's Date: 1/21/2025  Payor: MEDICARE / Plan: MEDICARE PART A AND B / Product Type: *No Product type* /   Time Calculation  Start Time: 1115  Stop Time: 1154  Time Calculation (min): 39 min        PT Therapeutic Procedures Time Entry  Neuromuscular Re-Education Time Entry: 24  Therapeutic Exercise Time Entry: 15           Current Problem  1. Back pain  Follow Up In Physical Therapy        Problem List Items Addressed This Visit    None  Visit Diagnoses         Codes    Back pain     M54.9            General  Reason for Referral: LBP  Referred By: Carlene SOTO CNP  Past Medical History Relevant to Rehab: non hodgkins lymphoma, RA, arthritis  Subjective   Current Condition:   Better  Patient reports feeling good today. At his usual amount of discomfort. Pt reports new HEP has been going well, but the balance exercises are difficult.    Performing HEP?: Yes    Precautions  Precautions  Medical Precautions: No known precautions/limitation  Pain  Pain Assessment: 0-10  0-10 (Numeric) Pain Score: 4  Pain Type: Chronic pain  Pain Location: Back  Pain Orientation: Lower    Objective     Treatments:    Therapeutic Exercise  Therapeutic Exercise Performed: Yes  Therapeutic Exercise Activity 1: physio step x6 min as warm up  Therapeutic Exercise Activity 2: vuong hip ext 2x10 w 3# weight  Therapeutic Exercise Activity 3: vuong knee flex 2x10 w 3# weight  Therapeutic Exercise Activity 4: hip abd machine 3x10 w 60#    Balance/Neuromuscular Re-Education  Balance/Neuromuscular Re-Education Activity 1: large stepping w unilateral twist holding 4# MB, focus on dynamic balance w NBOS 20 ft x4  Balance/Neuromuscular Re-Education Activity 2: retro amb 20 ft x2  Balance/Neuromuscular Re-Education Activity 3: high knee stepping w intermittent pauses 20 ft x2  Balance/Neuromuscular Re-Education Activity 4: [place] 20 ft x2  Balance/Neuromuscular  Re-Education Activity 5: sliders posterior only 1x10 ea side  Balance/Neuromuscular Re-Education Activity 6: sliders behind stance leg 1x10  Balance/Neuromuscular Re-Education Activity 7: 2x10 OH switches of 2# MB on low squat position on airex pad, several intermittent LOBs     EDUCATION:   Individual(s) Educated: Patient  Education Provided: progress in therapy  Handout(s) Provided: Scanned into chart  Home Program: same as previous  Risk and Benefits Discussed with Patient/Caregiver/Other: Yes   Patient/Caregiver Demonstrated Understanding: Yes   Patient Response to Education: Patient/Caregiver verbalized understanding of information, Patient/Caregiver performed return demonstration of exercises/activities, and Patient/Caregiver asked appropriate questions    Assessment:   Pt tolerating session well. Cont w baseline amount of pain but does feel improvement w performing ADLs and IADLs. Session focused on dynamic balance interventions which were a good challenge for pt. Pt cont to demo jerry hip and core weakness w balance deficits. Pt cont to demo deficits as stated, and requires cont skilled PT intervention to assist pt in improving QOL.       Plan: Continue with POC. Jerry hip and core strengthening, and gentle spine ROM.   OP PT Plan  Treatment/Interventions: Education/ Instruction, Manual therapy, Neuromuscular re-education, Therapeutic activities, Therapeutic exercises  PT Plan: Skilled PT  PT Frequency: 2 times per week  Duration: 4 weeks  Onset Date: 11/19/04  Certification Period Start Date: 01/17/25  Certification Period End Date: 02/14/25  Number of Treatments Authorized: 1/8  Rehab Potential: Good  Plan of Care Agreement: Patient    Goals:  Active       PT Problem       Pt will improve core strength by 1 grade or more as evidence of improved functional mobility.   (Progressing)       Start:  11/19/24    Expected End:  02/14/25            Pt will improve jerry hip ext/abd strength by 1 grade or more as  evidence of improved functional mobility.   (Progressing)       Start:  11/19/24    Expected End:  02/14/25            Pt will be indep and compliant in administering HEP  (Met)       Start:  11/19/24    Expected End:  12/17/24    Resolved:  12/20/24         Pt will report decreased pain to no more than </=3/10.  (Met)       Start:  11/19/24    Expected End:  12/17/24    Resolved:  12/20/24         Pt will improve ION score to <38% as evidence of improved functional ability.  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

## 2025-01-24 ENCOUNTER — TREATMENT (OUTPATIENT)
Dept: PHYSICAL THERAPY | Facility: HOSPITAL | Age: 75
End: 2025-01-24
Payer: MEDICARE

## 2025-01-24 DIAGNOSIS — M54.9 BACK PAIN: ICD-10-CM

## 2025-01-24 PROCEDURE — 97110 THERAPEUTIC EXERCISES: CPT | Mod: GP

## 2025-01-24 PROCEDURE — 97112 NEUROMUSCULAR REEDUCATION: CPT | Mod: GP

## 2025-01-24 ASSESSMENT — PAIN SCALES - GENERAL: PAINLEVEL_OUTOF10: 4

## 2025-01-24 ASSESSMENT — PAIN - FUNCTIONAL ASSESSMENT: PAIN_FUNCTIONAL_ASSESSMENT: 0-10

## 2025-01-24 NOTE — PROGRESS NOTES
Physical Therapy Treatment    Patient Name: Vincent Ramirez  MRN: [id number]  Today's Date: 1/24/2025  Payor: MEDICARE / Plan: MEDICARE PART A AND B / Product Type: *No Product type* /   Time Calculation  Start Time: 0941  Stop Time: 1021  Time Calculation (min): 40 min        PT Therapeutic Procedures Time Entry  Neuromuscular Re-Education Time Entry: 20  Therapeutic Exercise Time Entry: 20      Current Problem  1. Back pain  Follow Up In Physical Therapy        Problem List Items Addressed This Visit    None  Visit Diagnoses         Codes    Back pain     M54.9            General  Reason for Referral: LBP  Referred By: Carlene SOTO CNP  Past Medical History Relevant to Rehab: non hodgkins lymphoma, RA, arthritis  Subjective   Current Condition:   Better  Patient reports he is having his usual pain today. Pt states the strengthening part of HEP is going well, but the balance is still difficult.    Performing HEP?: Yes    Precautions  Precautions  Medical Precautions: No known precautions/limitation  Pain  Pain Assessment: 0-10  0-10 (Numeric) Pain Score: 4  Pain Type: Chronic pain  Pain Location: Back  Pain Orientation: Lower    Objective     Treatments:    Therapeutic Exercise  Therapeutic Exercise Performed: Yes  Therapeutic Exercise Activity 1: physio step x6 min as warm up  Therapeutic Exercise Activity 2: prone press ups 2x10  Therapeutic Exercise Activity 3: cat cow 2x10  Therapeutic Exercise Activity 4: mini crunches 2x10  Therapeutic Exercise Activity 5: mini oblique crunches 2x10  Therapeutic Exercise Activity 6: donkey kicks 2x10  Therapeutic Exercise Activity 7: 2x20 penguin waddles    Balance/Neuromuscular Re-Education  Balance/Neuromuscular Re-Education Activity 1: [place] 20 ft x4  Balance/Neuromuscular Re-Education Activity 2: lateral amb 20 ft x2  Balance/Neuromuscular Re-Education Activity 3: 20 ft x4 large amplitude stepping holding 6# MB  Balance/Neuromuscular Re-Education Activity 4:  unilateral stance, clock tapping at 12 and 6 x20 ea side  Balance/Neuromuscular Re-Education Activity 5: unilateral stance, clock tapping at 3 and 6 x20 ea side (slightly more difficult on R side)  Balance/Neuromuscular Re-Education Activity 6: 1x10 low squatting on airex pad  Balance/Neuromuscular Re-Education Activity 7: fwd leans and OH reaches w 1 UE holding 2# MB in low squat position on airex pad. 1x10 ea side     EDUCATION:   Individual(s) Educated: Patient  Education Provided: progression in therapy  Handout(s) Provided: Scanned into chart  Home Program: same as previous  Risk and Benefits Discussed with Patient/Caregiver/Other: Yes   Patient/Caregiver Demonstrated Understanding: Yes   Patient Response to Education: Patient/Caregiver verbalized understanding of information, Patient/Caregiver performed return demonstration of exercises/activities, and Patient/Caregiver asked appropriate questions    Assessment:   Pt tolerating session well. Increased time spent on there-ex interventions this ate w good reesults. Pt also performing higher level balance interventions than previously w good progress. Pt cont to have pain w ADLs, and demos hip and core weaknesss. Pt cont to demo deficits as stated, and requires cont skilled PT intervention to assist pt in improving QOL.       Plan: Continue with POC. Cont w spine ROM, lauren hip and core strengthening, and balance interventions.  OP PT Plan  Treatment/Interventions: Education/ Instruction, Manual therapy, Neuromuscular re-education, Therapeutic activities, Therapeutic exercises  PT Plan: Skilled PT  PT Frequency: 2 times per week  Duration: 4 weeks  Onset Date: 11/19/04  Certification Period Start Date: 01/17/25  Certification Period End Date: 02/14/25  Number of Treatments Authorized: 2/8  Rehab Potential: Good  Plan of Care Agreement: Patient    Goals:  Active       PT Problem       Pt will improve core strength by 1 grade or more as evidence of improved  functional mobility.   (Progressing)       Start:  11/19/24    Expected End:  02/14/25            Pt will improve lauren hip ext/abd strength by 1 grade or more as evidence of improved functional mobility.   (Progressing)       Start:  11/19/24    Expected End:  02/14/25            Pt will be indep and compliant in administering HEP  (Met)       Start:  11/19/24    Expected End:  12/17/24    Resolved:  12/20/24         Pt will report decreased pain to no more than </=3/10.  (Met)       Start:  11/19/24    Expected End:  12/17/24    Resolved:  12/20/24         Pt will improve ION score to <38% as evidence of improved functional ability.  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

## 2025-01-28 ENCOUNTER — TREATMENT (OUTPATIENT)
Dept: PHYSICAL THERAPY | Facility: HOSPITAL | Age: 75
End: 2025-01-28
Payer: MEDICARE

## 2025-01-28 DIAGNOSIS — M54.9 BACK PAIN: ICD-10-CM

## 2025-01-28 PROCEDURE — 97110 THERAPEUTIC EXERCISES: CPT | Mod: GP

## 2025-01-28 PROCEDURE — 97112 NEUROMUSCULAR REEDUCATION: CPT | Mod: GP

## 2025-01-28 ASSESSMENT — PAIN SCALES - GENERAL: PAINLEVEL_OUTOF10: 4

## 2025-01-28 ASSESSMENT — PAIN - FUNCTIONAL ASSESSMENT: PAIN_FUNCTIONAL_ASSESSMENT: 0-10

## 2025-01-28 NOTE — PROGRESS NOTES
Physical Therapy Treatment    Patient Name: Vincent Ramirez  MRN: [id number]  Today's Date: 1/28/2025  Payor: MEDICARE / Plan: MEDICARE PART A AND B / Product Type: *No Product type* /   Time Calculation  Start Time: 0900  Stop Time: 0940  Time Calculation (min): 40 min        PT Therapeutic Procedures Time Entry  Neuromuscular Re-Education Time Entry: 20  Therapeutic Exercise Time Entry: 20        Current Problem  1. Back pain  Follow Up In Physical Therapy        Problem List Items Addressed This Visit    None  Visit Diagnoses         Codes    Back pain     M54.9            General  Reason for Referral: LBP  Referred By: Carlene SOTO CNP  Past Medical History Relevant to Rehab: non hodgkins lymphoma, RA, arthritis    Subjective   Current Condition:   Better  Patient reports cont baseline amount of pain. States he is cont to work on his balance activities at home.    Performing HEP?: Yes    Precautions  Precautions  Medical Precautions: No known precautions/limitation  Pain  Pain Assessment: 0-10  0-10 (Numeric) Pain Score: 4  Pain Type: Chronic pain  Pain Location: Back  Pain Orientation: Lower    Objective     Treatments:    Therapeutic Exercise  Therapeutic Exercise Performed: Yes  Therapeutic Exercise Activity 1: physio step x6 min as warm up  Therapeutic Exercise Activity 2: 2x10 donkey kicks  Therapeutic Exercise Activity 3: 1x10 fire hydrants  Therapeutic Exercise Activity 4: 1x10 bird dogs  Therapeutic Exercise Activity 5: mini oblique crunches 2x10  Therapeutic Exercise Activity 6: 2x10 mini crunches    Balance/Neuromuscular Re-Education  Balance/Neuromuscular Re-Education Activity 1: half kneeling w ipsilateral twist holding 4# MB x20 ea side - focus on balance  Balance/Neuromuscular Re-Education Activity 2: [place] 20 ft x4  Balance/Neuromuscular Re-Education Activity 3: staggered stance palloff press w GTB 1x20 ea side  Balance/Neuromuscular Re-Education Activity 4: palloff press w GTB, NBOS on airex  x20 ea side  Balance/Neuromuscular Re-Education Activity 5: unilateral stance, clock tapping at 3 and 6 x20 ea side  Balance/Neuromuscular Re-Education Activity 7: fwd leans and OH reaches w 1 UE holding 2# DB in low squat position on airex pad. 1x10 ea side     EDUCATION:   Individual(s) Educated: Patient  Education Provided: goal of interventions and progress in therapy  Handout(s) Provided: Scanned into chart  Home Program: same as previous  Risk and Benefits Discussed with Patient/Caregiver/Other: Yes   Patient/Caregiver Demonstrated Understanding: Yes   Patient Response to Education: Patient/Caregiver verbalized understanding of information, Patient/Caregiver performed return demonstration of exercises/activities, and Patient/Caregiver asked appropriate questions    Assessment:   Pt tolerating session well. Increased focus on balance and core interventions this date. 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 Pt also cont to report pain w ADLs, and lauren hip and core weakness. Pt cont to demo deficits as stated, and requires cont skilled PT intervention to assist pt in returning to PLOF.        Plan: Continue with POC. BLE strengthening, core strengthening, balance, and spine ROM.  OP PT Plan  Treatment/Interventions: Education/ Instruction, Manual therapy, Neuromuscular re-education, Therapeutic activities, Therapeutic exercises  PT Plan: Skilled PT  PT Frequency: 2 times per week  Duration: 4 weeks  Onset Date: 11/19/04  Certification Period Start Date: 01/17/25  Certification Period End Date: 02/14/25  Number of Treatments Authorized: 3/8  Rehab Potential: Good  Plan of Care Agreement: Patient    Goals:  Active       PT Problem       Pt will improve core strength by 1 grade or more as evidence of improved functional mobility.   (Progressing)       Start:  11/19/24    Expected End:  02/14/25            Pt will improve lauren hip ext/abd strength by 1 grade or more as evidence of  improved functional mobility.   (Progressing)       Start:  11/19/24    Expected End:  02/14/25            Pt will be indep and compliant in administering HEP  (Met)       Start:  11/19/24    Expected End:  12/17/24    Resolved:  12/20/24         Pt will report decreased pain to no more than </=3/10.  (Met)       Start:  11/19/24    Expected End:  12/17/24    Resolved:  12/20/24         Pt will improve ION score to <38% as evidence of improved functional ability.  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

## 2025-01-31 ENCOUNTER — TREATMENT (OUTPATIENT)
Dept: PHYSICAL THERAPY | Facility: HOSPITAL | Age: 75
End: 2025-01-31
Payer: MEDICARE

## 2025-01-31 DIAGNOSIS — M54.9 BACK PAIN: ICD-10-CM

## 2025-01-31 PROCEDURE — 97112 NEUROMUSCULAR REEDUCATION: CPT | Mod: GP

## 2025-01-31 PROCEDURE — 97110 THERAPEUTIC EXERCISES: CPT | Mod: GP

## 2025-01-31 ASSESSMENT — PAIN - FUNCTIONAL ASSESSMENT: PAIN_FUNCTIONAL_ASSESSMENT: 0-10

## 2025-01-31 ASSESSMENT — PAIN SCALES - GENERAL: PAINLEVEL_OUTOF10: 4

## 2025-01-31 NOTE — PROGRESS NOTES
Physical Therapy Treatment    Patient Name: Vincent Ramirez  MRN: [id number]  Today's Date: 1/31/2025  Payor: MEDICARE / Plan: MEDICARE PART A AND B / Product Type: *No Product type* /   Time Calculation  Start Time: 0946  Stop Time: 1024  Time Calculation (min): 38 min        PT Therapeutic Procedures Time Entry  Neuromuscular Re-Education Time Entry: 24  Therapeutic Exercise Time Entry: 14        Current Problem  1. Back pain  Follow Up In Physical Therapy        Problem List Items Addressed This Visit    None  Visit Diagnoses         Codes    Back pain     M54.9            General  Reason for Referral: LBP  Referred By: Carlene SOTO CNP  Past Medical History Relevant to Rehab: non hodgkins lymphoma, RA, arthritis    Subjective   Current Condition:   Better  Patient reports he feels a little off today d/t having a bad nights sleep. HEP is still going well, and he is happy w his progress though his pain is still similar.    Performing HEP?: Yes    Precautions  Precautions  Medical Precautions: No known precautions/limitation  Pain  Pain Assessment: 0-10  0-10 (Numeric) Pain Score: 4  Pain Type: Chronic pain  Pain Location: Back  Pain Orientation: Lower    Objective     Treatments:    Therapeutic Exercise  Therapeutic Exercise Performed: Yes  Therapeutic Exercise Activity 1: physio step x6 min as warm up  Therapeutic Exercise Activity 2: hip abd machine 3x10 w 60#  Therapeutic Exercise Activity 3: Tobin hip ext 2x10 w 5#  Therapeutic Exercise Activity 4: Tobin glute focused back ext 2x10 w 6# MB    Balance/Neuromuscular Re-Education  Balance/Neuromuscular Re-Education Activity 1: static stance on alberto disc 3x 1 min repetitive holding  Balance/Neuromuscular Re-Education Activity 2: 20 ft x2 partial lunge w 6# MB and twisting to contralateral side  Balance/Neuromuscular Re-Education Activity 3: 20 ft x2 amb w high knees, brief pause for SLS  Balance/Neuromuscular Re-Education Activity 4: chops and lifts w GTB  1x10 ea side ea direction w WBOS on airex pad  Balance/Neuromuscular Re-Education Activity 5: SLS w BUE on neuro pole 3 till failure ea side. intermittent LOBs throughout but pt able to recover by self  Balance/Neuromuscular Re-Education Activity 7: fwd leans and OH reaches w 1 UE holding 2# DB in low squat position on airex pad. 1x10 ea side, increasing LOBs w fatigue     EDUCATION:   Individual(s) Educated: Patient  Education Provided: goal of interventions  Handout(s) Provided: Scanned into chart  Home Program: same as previous  Risk and Benefits Discussed with Patient/Caregiver/Other: Yes   Patient/Caregiver Demonstrated Understanding: Yes   Patient Response to Education: Patient/Caregiver verbalized understanding of information, Patient/Caregiver performed return demonstration of exercises/activities, and Patient/Caregiver asked appropriate questions    Assessment:   Pt tolerating session well, feeling good at end of session. Progressed slightly w weights and w balance interventions this date, performing all well. Pt cont to be challenged w balance interventions and cont to have difficulty w BLE and core strength. Pt cont to demo deficits as stated, and requires cont skilled PT intervention to assist pt in improving QOL.       Plan: Continue with POC. BLE and core strengthening, w trunk ext and balance.  OP PT Plan  Treatment/Interventions: Education/ Instruction, Manual therapy, Neuromuscular re-education, Therapeutic activities, Therapeutic exercises  PT Plan: Skilled PT  PT Frequency: 2 times per week  Duration: 4 weeks  Onset Date: 11/19/04  Certification Period Start Date: 01/17/25  Certification Period End Date: 02/14/25  Number of Treatments Authorized: 4/8  Rehab Potential: Good  Plan of Care Agreement: Patient    Goals:  Active       PT Problem       Pt will improve core strength by 1 grade or more as evidence of improved functional mobility.   (Progressing)       Start:  11/19/24    Expected End:   02/14/25            Pt will improve lauren hip ext/abd strength by 1 grade or more as evidence of improved functional mobility.   (Progressing)       Start:  11/19/24    Expected End:  02/14/25            Pt will be indep and compliant in administering HEP  (Met)       Start:  11/19/24    Expected End:  12/17/24    Resolved:  12/20/24         Pt will report decreased pain to no more than </=3/10.  (Met)       Start:  11/19/24    Expected End:  12/17/24    Resolved:  12/20/24         Pt will improve ION score to <38% as evidence of improved functional ability.  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

## 2025-02-04 ENCOUNTER — DOCUMENTATION (OUTPATIENT)
Dept: PRIMARY CARE | Facility: CLINIC | Age: 75
End: 2025-02-04
Payer: MEDICARE

## 2025-02-04 ENCOUNTER — DOCUMENTATION (OUTPATIENT)
Dept: PHYSICAL THERAPY | Facility: HOSPITAL | Age: 75
End: 2025-02-04
Payer: MEDICARE

## 2025-02-04 NOTE — PROGRESS NOTES
"Physical Therapy                 Therapy Communication Note    Patient Name: Vincent Ramirez \"Car\"  MRN: [id number]  Department:   Room: Room/bed info not found  Today's Date: 2/4/2025     Discipline: Physical Therapy    Missed Time: No Show    Comment: Pt did not show to mark. PSR called and pt stated he had the wrong time. Next mark for 2/7/2025.  "

## 2025-02-04 NOTE — PROGRESS NOTES
Prior Authorization for Ozempic 2mg was initiated over the pone with [facility] carlos, medication was approved. Approval number [id number] good for one year. Approval paperwork will be faxed tot he office in the next 72 hour and patient will receive a copy in the mail. I did call the patient to update him.    [facility] carlos [phone]

## 2025-02-07 ENCOUNTER — TREATMENT (OUTPATIENT)
Dept: PHYSICAL THERAPY | Facility: HOSPITAL | Age: 75
End: 2025-02-07
Payer: MEDICARE

## 2025-02-07 DIAGNOSIS — M54.9 BACK PAIN: ICD-10-CM

## 2025-02-07 PROCEDURE — 97112 NEUROMUSCULAR REEDUCATION: CPT | Mod: GP

## 2025-02-07 PROCEDURE — 97110 THERAPEUTIC EXERCISES: CPT | Mod: GP

## 2025-02-07 ASSESSMENT — PAIN - FUNCTIONAL ASSESSMENT: PAIN_FUNCTIONAL_ASSESSMENT: 0-10

## 2025-02-07 ASSESSMENT — PAIN SCALES - GENERAL: PAINLEVEL_OUTOF10: 4

## 2025-02-07 NOTE — PROGRESS NOTES
Physical Therapy Treatment    Patient Name: Vincent Ramirez  MRN: [id number]  Today's Date: 2/7/2025  Payor: MEDICARE / Plan: MEDICARE PART A AND B / Product Type: *No Product type* /   Time Calculation  Start Time: 0945  Stop Time: 1028  Time Calculation (min): 43 min        PT Therapeutic Procedures Time Entry  Neuromuscular Re-Education Time Entry: 23  Therapeutic Exercise Time Entry: 20      Current Problem  1. Back pain  Follow Up In Physical Therapy        Problem List Items Addressed This Visit    None  Visit Diagnoses         Codes    Back pain     M54.9            General  Reason for Referral: LBP  Referred By: Carlene SOTO CNP  Past Medical History Relevant to Rehab: non hodgkins lymphoma, RA, arthritis  Subjective   Current Condition:   Better  Patient reports he was in the car for a prolonged time and he started to get pain in his low back. Pt states he is feeling a little extra stiff and tight today.     Performing HEP?: Yes    Precautions  Precautions  Medical Precautions: No known precautions/limitation  Pain  Pain Assessment: 0-10  0-10 (Numeric) Pain Score: 4  Pain Type: Chronic pain  Pain Location: Back  Pain Orientation: Lower    Objective     Treatments:    Therapeutic Exercise  Therapeutic Exercise Performed: Yes  Therapeutic Exercise Activity 1: bike x6 min as warm up  Therapeutic Exercise Activity 2: donkey kicks keeping legs straight 2x10 ea side  Therapeutic Exercise Activity 3: dead bug alt 2x10 ea side (small range)  Therapeutic Exercise Activity 4: 2x16 supine penguin waddles    Balance/Neuromuscular Re-Education  Balance/Neuromuscular Re-Education Activity 1: palloff press w GTB, NBOS on airex x20 ea side  Balance/Neuromuscular Re-Education Activity 2: step ups w contralateral knee drive 2x10 ea side  Balance/Neuromuscular Re-Education Activity 3: low squat bouncing tennis ball on airex pad x20  Balance/Neuromuscular Re-Education Activity 4: tandem amb 2x length of //  bars  Balance/Neuromuscular Re-Education Activity 5: 6x till failure SLS w BUE on neuropole    EDUCATION:   Individual(s) Educated: Patient  Education Provided: lumbar support  Handout(s) Provided: Scanned into chart  Home Program: same as previous, told to hold prone hip ext for 3 sec at end range  Risk and Benefits Discussed with Patient/Caregiver/Other: Yes   Patient/Caregiver Demonstrated Understanding: Yes   Patient Response to Education: Patient/Caregiver verbalized understanding of information, Patient/Caregiver performed return demonstration of exercises/activities, and Patient/Caregiver asked appropriate questions    Assessment:   Pt tolerating session fair. Pt having increased pain today so session regressed as needed. More dynamic balance interventions added w pt tolerating well. Pt also heavily edu on importance of position changes for pain and mobility. Pt cont to demo poor balance and hip/core weakness. Pt cont to demo deficits as stated, and requires cont skilled PT intervention to assist pt in returning to PLOF.        Plan: Continue with POC. Cont w lauren hip and core strengthening w dynamic balance interventions  OP PT Plan  Treatment/Interventions: Education/ Instruction, Manual therapy, Neuromuscular re-education, Therapeutic activities, Therapeutic exercises  PT Plan: Skilled PT  PT Frequency: 2 times per week  Duration: 4 weeks  Onset Date: 11/19/04  Certification Period Start Date: 01/17/25  Certification Period End Date: 02/14/25  Number of Treatments Authorized: 5/8  Rehab Potential: Good  Plan of Care Agreement: Patient    Goals:  Active       PT Problem       Pt will improve core strength by 1 grade or more as evidence of improved functional mobility.   (Progressing)       Start:  11/19/24    Expected End:  02/14/25            Pt will improve lauren hip ext/abd strength by 1 grade or more as evidence of improved functional mobility.   (Progressing)       Start:  11/19/24    Expected End:   02/14/25            Pt will be indep and compliant in administering HEP  (Met)       Start:  11/19/24    Expected End:  12/17/24    Resolved:  12/20/24         Pt will report decreased pain to no more than </=3/10.  (Met)       Start:  11/19/24    Expected End:  12/17/24    Resolved:  12/20/24         Pt will improve ION score to <38% as evidence of improved functional ability.  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

## 2025-02-09 DIAGNOSIS — E55.9 VITAMIN D DEFICIENCY: ICD-10-CM

## 2025-02-10 ENCOUNTER — APPOINTMENT (OUTPATIENT)
Dept: OTOLARYNGOLOGY | Facility: CLINIC | Age: 75
End: 2025-02-10
Payer: MEDICARE

## 2025-02-10 DIAGNOSIS — Z86.69 HISTORY OF EAR DISORDER: Primary | ICD-10-CM

## 2025-02-10 PROCEDURE — 1123F ACP DISCUSS/DSCN MKR DOCD: CPT | Performed by: OTOLARYNGOLOGY

## 2025-02-10 PROCEDURE — 1157F ADVNC CARE PLAN IN RCRD: CPT | Performed by: OTOLARYNGOLOGY

## 2025-02-10 PROCEDURE — 1159F MED LIST DOCD IN RCRD: CPT | Performed by: OTOLARYNGOLOGY

## 2025-02-10 PROCEDURE — 99212 OFFICE O/P EST SF 10 MIN: CPT | Performed by: OTOLARYNGOLOGY

## 2025-02-10 PROCEDURE — 1036F TOBACCO NON-USER: CPT | Performed by: OTOLARYNGOLOGY

## 2025-02-10 RX ORDER — ERGOCALCIFEROL 1.25 MG/1
50000 CAPSULE ORAL
Qty: 30 CAPSULE | Refills: 1 | Status: SHIPPED | OUTPATIENT
Start: 2025-02-16

## 2025-02-10 NOTE — PROGRESS NOTES
"   Chief Complaint   \"Left ear full\"      History of Present Illness    02.10.2025.  His hearing in his left ear is almost back to baseline.  On examination, left tympanic membrane looks essentially intact.  There is a small scab over left tympanic membrane posterior inferiorly.  No effusion.  Fiberoptic endoscopy was not done today, as it was done in 2022 for his previous otitis media with effusion.    Plan  1-follow-up as needed  ______________________________________________________________________    01.10.2025: He feels fullness in his left ear for the past 3 weeks or so. On examination, patient has left otitis media with effusion. He had a similar episode about 3 years ago and was treated with left myringotomy. History of multiple childhood ear infections, which went away after the age of 13. On examination, nasal septum is deviated to left.     Plan  1-follow-up in 1 month  ______________________________________________________________________    05.16.2022: He comes for follow up. Left ear feels much better. On examination left TM intact, no visible effusion. Secondly he has off and on tickling cough coming from his throat.   He had nasal surgery and UPPP in the past.     On examination, there was yellowish sticky purulent thick discharge at oropharynx  Inferior turbinates look moderately congested.     Topical sterid and antiseptic was applied to his throat.     Recommendation:  1- oral doxycycline  3- chlorhexidine mouth wash  3- follow up as needed   _______________________________________________________________________________________________      04.25.2022: Mr. Ramirez is a 72 yo M. He has fullness in left ear since the beginning of this year.  History of ear problems in childhood.     On examination, effusion (+) at left. Right TM looks essentially normal  Nasal mucosa looks pale. Nasal septum deviated to left.  UPPP (+)     Left myringotomy was done in the office.     Plan:  1- follow up in 3 weeks    "   Review of Systems  Below is a copy of his initial ROS, he does not have fever today.     Constitutional: feeling tired and recent ~Ulb weight gain, but no fever and no recent weight loss.   Eyes: no eye pain, no double vision and no itching of the eyes.   ENMT: difficulty with hearing, hearing loss, the ears feel full and postnasal drip, but no pain in the ear, no vertigo, no tinnitus, no discharge from the ears, no nosebleeds, no nasal congestion, no rhinorrhea, no sinus pressure, no nasal blockage/obstruction, no snoring, no sore throat, no hoarseness, the gums were not bleeding, no dry mouth, no dysphagia and no mouth ulcers.   Cardiovascular: no history of murmur, no chest pain, no palpitations and no lower extremity edema.   Respiratory: shortness of breath and dyspnea during exertion, but no wheezing, not coughing up sputum and not coughing up blood.   Gastrointestinal: no heartburn, no vomiting, no change in appetite and no pain on swallowing.   Genitourinary: no dysuria and no difficulty urinating.   Musculoskeletal: arthralgias and joint stiffness, but no myalgias.   Integumentary: dry skin, but no rashes, no skin lesions, no itching and no unusual growth on the skin.   Neurological: no fainting, no headache, no numbness, no convulsions and no weakness.   Psychiatric: depression, but no anxiety.   Endocrine: no increased thirst.   Hematologic/Lymphatic: no swollen glands, no tendency for easy bleeding and no tendency for easy bruising.   All other systems have been reviewed and are negative for complaint.        Physical Exam (initial exam note)  General appearance: Healthy-appearing, well-nourished, well groomed, in no acute distress.      Head and Face: Atraumatic with no masses, lesions, or scarring.      Salivary glands: No tenderness of the parotid glands or parotid masses. (old exam)     No tenderness of the submandibular glands or submandibular masses. (old exam)     Facial strength: Normal  strength and symmetry, no synkinesis or facial tic.      Eyes: Conjunctivas look non-hyperemic bilaterally     Ears: Bilaterally ear canals look normal. Tympanic membranes intact, (+) fluid at left. (old exam)     Nose: Nasal mucosa looks pale. Nasal septum deviated to left. (old exam)     Oral Cavity/Mouth: Lips and tongue look normal. (old exam)     Throat: UPPP (+) (old exam)     Neck: Symmetrical, trachea midline. (old exam)     Pulmonary: Normal respiratory effort.      Lymphatic: No palpable pathologic lymph nodes at neck.      Neurological/Psychiatric: Orientation to person, place, and time: Normal.   Mood and affect: Normal.      Extremities: No clubbing.        Procedure    LEFT MYRINGOTOMY: 01.10.2025  Operative microscope was brought to patient's left ear. On examination, left tympanic membrane was intact. Topical phenol was applied inferiorly to left tympanic membrane. Left myringotomy was done using 2 inches long 21g needle, there was effusion, suction cleaning was done. Ofloxacin drops were applied.     Procedure was concluded.  _________________________________________________________________    LEFT MYRINGOTOMY: 04.25.2022  Operative microscope was brought to patient's left ear. On examination, left tympanic membrane was intact. Topical phenol was applied posterosuperiorly to left tympanic membrane. Left myringotomy was done using 2 inches long 25g needle, some of the effusion was suctioned, some of it remained. Ofloxacin drops were applied.     Procedure was concluded.  _________________________________________________________________     FIBEROPTIC NASOPHARYNGOSCOPY 05.16.2022  Nasal mucosa looks pale and there is coblesotoning of mucosa. There was no nasal discharge, but there was purulent discharge at nasopharynx on fiberoptic examination.         Patient Discussion/Summary     02.10.2025.  His hearing in his left ear is almost back to baseline.  On examination, left tympanic membrane looks  essentially intact.  There is a small scab over left tympanic membrane posterior inferiorly.  No effusion.  Fiberoptic endoscopy was not done today, as it was done in 2022 for his previous otitis media with effusion.    Plan  1-follow-up as needed  ______________________________________________________________________    01.10.2025: He feels fullness in his left ear for the past 3 weeks or so. On examination, patient has left otitis media with effusion. He had a similar episode 3 years ago and was treated with left myringotomy. History of multiple childhood ear infections which went away after the age of 13. On examination, mnasal septum devited to left Vincent Ramirez is a 74 y.o. year old male patient with   referred for  .  Patient says he does not have allergies, but nasal mucosa looks plae and there is coblesotoning of mucosa. There was no nasal dischare but there was puulnent discharge at nasopharynx on fiberoptic examination.    Plan  1-follow-up in 1 month  2-fluticasone nasal spray  3-oral doxycycline for purulent material at nasopharynx was  ______________________________________________________________________    05.16.2022: He comes for follow up. Left ear feels much better. On examination left TM intact, no visible effusion. Secondly he has off and on tickling cough coming from his throat.   He had nasal surgery and UPPP in the past.     On examination, there was yellowish sticky purulent thick discharge at oropharynx.  Inferior turbinates look moderately congested. No nasopharyngeal mass on endoscopy.     Topical sterid and antiseptic was applied to his throat.     Recommendation:  1- oral doxycycline  3- chlorhexidine mouth wash  3- follow up as needed   _______________________________________________________________________________________________      04.25.2022: Mr. Ramirez is a 70 yo M. He has fullness in left ear since the beginning of this year.  History of ear problems in childhood.     On  examination, effusion (+) at left. Right TM looks essentially normal  Nasal mucosa looks pale. Nasal septum deviated to left.  UPPP (+)     Left myringotomy was done in the office.     Plan:  1- follow up in 3 weeks

## 2025-02-11 ENCOUNTER — DOCUMENTATION (OUTPATIENT)
Dept: PHYSICAL THERAPY | Facility: HOSPITAL | Age: 75
End: 2025-02-11

## 2025-02-11 ENCOUNTER — APPOINTMENT (OUTPATIENT)
Dept: PHYSICAL THERAPY | Facility: HOSPITAL | Age: 75
End: 2025-02-11
Payer: MEDICARE

## 2025-02-11 NOTE — PROGRESS NOTES
"Physical Therapy                 Therapy Communication Note    Patient Name: Vincent Ramirez \"Car\"  MRN: [id number]  Department:   Room: Room/bed info not found  Today's Date: 2/11/2025     Discipline: Physical Therapy    Missed Time: Cancel    Comment: Pt cancelling today's mark d/t having another conflicting mark. Rescheduled for 2/18/2025.  "

## 2025-02-12 ENCOUNTER — OFFICE VISIT (OUTPATIENT)
Dept: PAIN MEDICINE | Facility: HOSPITAL | Age: 75
End: 2025-02-12
Payer: MEDICARE

## 2025-02-12 VITALS
WEIGHT: 265 LBS | BODY MASS INDEX: 37.1 KG/M2 | SYSTOLIC BLOOD PRESSURE: 143 MMHG | DIASTOLIC BLOOD PRESSURE: 85 MMHG | RESPIRATION RATE: 16 BRPM | HEIGHT: 71 IN | TEMPERATURE: 98.1 F | HEART RATE: 89 BPM | OXYGEN SATURATION: 95 %

## 2025-02-12 DIAGNOSIS — M54.50 CHRONIC BILATERAL LOW BACK PAIN WITHOUT SCIATICA: ICD-10-CM

## 2025-02-12 DIAGNOSIS — M47.817 FACET ARTHROPATHY, LUMBOSACRAL: Primary | ICD-10-CM

## 2025-02-12 DIAGNOSIS — G89.29 CHRONIC BILATERAL LOW BACK PAIN WITHOUT SCIATICA: ICD-10-CM

## 2025-02-12 DIAGNOSIS — Z79.899 MEDICATION MANAGEMENT: ICD-10-CM

## 2025-02-12 PROCEDURE — 3077F SYST BP >= 140 MM HG: CPT | Performed by: NURSE PRACTITIONER

## 2025-02-12 PROCEDURE — 3008F BODY MASS INDEX DOCD: CPT | Performed by: NURSE PRACTITIONER

## 2025-02-12 PROCEDURE — 99214 OFFICE O/P EST MOD 30 MIN: CPT | Performed by: NURSE PRACTITIONER

## 2025-02-12 PROCEDURE — 1157F ADVNC CARE PLAN IN RCRD: CPT | Performed by: NURSE PRACTITIONER

## 2025-02-12 PROCEDURE — 1036F TOBACCO NON-USER: CPT | Performed by: NURSE PRACTITIONER

## 2025-02-12 PROCEDURE — 3079F DIAST BP 80-89 MM HG: CPT | Performed by: NURSE PRACTITIONER

## 2025-02-12 PROCEDURE — 1160F RVW MEDS BY RX/DR IN RCRD: CPT | Performed by: NURSE PRACTITIONER

## 2025-02-12 PROCEDURE — 1159F MED LIST DOCD IN RCRD: CPT | Performed by: NURSE PRACTITIONER

## 2025-02-12 PROCEDURE — 1125F AMNT PAIN NOTED PAIN PRSNT: CPT | Performed by: NURSE PRACTITIONER

## 2025-02-12 PROCEDURE — 1123F ACP DISCUSS/DSCN MKR DOCD: CPT | Performed by: NURSE PRACTITIONER

## 2025-02-12 RX ORDER — GABAPENTIN 300 MG/1
300 CAPSULE ORAL 3 TIMES DAILY
Qty: 270 CAPSULE | Refills: 0 | Status: SHIPPED | OUTPATIENT
Start: 2025-02-12

## 2025-02-12 ASSESSMENT — ENCOUNTER SYMPTOMS
PSYCHIATRIC NEGATIVE: 1
ALLERGIC/IMMUNOLOGIC NEGATIVE: 1
RESPIRATORY NEGATIVE: 1
NEUROLOGICAL NEGATIVE: 1
EYES NEGATIVE: 1
JOINT SWELLING: 0
MYALGIAS: 1
GASTROINTESTINAL NEGATIVE: 1
ARTHRALGIAS: 1
NECK STIFFNESS: 0
CARDIOVASCULAR NEGATIVE: 1
NECK PAIN: 0
ENDOCRINE NEGATIVE: 1
BACK PAIN: 1
CONSTITUTIONAL NEGATIVE: 1

## 2025-02-12 ASSESSMENT — PAIN SCALES - GENERAL: PAINLEVEL_OUTOF10: 5

## 2025-02-12 NOTE — PATIENT INSTRUCTIONS
Continue with your prescribed medications.    Continue taking gabapentin 300 mg 3 times a day.    ---------------    Your next appointment is with Dr. Benoit in:    [facility]    For pain block/injection on: 4/7/2025, bilateral L4-L5, L5-S1 radiofrequency ablation    LOCAL    No aspirin this day    °You will receive a phone call the weekday before your appointment/procedure.   °Please KEEP your scheduled appointments.     °BRING your photo ID, insurance information, and a correct list of your home medications to EVERY visit.    °Please call our office at [phone] if you have any questions.     You will then be seen for a postprocedure follow-up in 8 to 10 weeks  ---------------

## 2025-02-12 NOTE — PROGRESS NOTES
Last urine drug screening date/ordered today: 11/13/2024.  Updated today     Results of last screen: As expected      Last opioid risk screening date/ordered today: 6/10/2024      Pain Scale Screening:   Pain Assessment and Documentation Tool (PADT)   Date of Assessment: 2/12/2025  Analgesia:   Patient reports her pain level on average during the past week is 4on a 0 - 10 scale.   Patient reports that her pain level at its worst during the past week was 6 on a 0 -10 scale.   65% of pain has been relieved during the past week per patient   Patient states that the amount of pain relief she is now obtaining from her current pain reliever(s) is enough to make a real difference in her life.   Query to clinician: Is the patient's pain relief clinically significant? yes  Activities of Daily Living:   Physical functioning: better  Family relationships: better  Social relationships: better  Mood: unchanged  Sleep patterns: unchanged  Overall functioning: better  Adverse Events: No, Vincent Ramirez is not experiencing side effects from current pain reliever.  Patients overall severity of side effect:none  Specific Analgesic Plan: Continue present regimen.

## 2025-02-12 NOTE — PROGRESS NOTES
"History Of Present Illness  Vincent Ramirez \"Car\" is a pleasant 74 y.o. male who is here for follow-up visit.  Patient is ambulatory without assistive device but gait is steady.  He arrives by himself.    Patient continues to have chronic lower back pain.  He rates his pain as 4 out of 10 with rest and it can go up to 5-6 out of 10 with activities.  Pain is constant.  He describes it as aching.  Back pain is aggravated with prolonged standing, walking or certain activities.  Getting up from a sitting position aggravates his back pain.  Back pain is also more noticeable in the morning and at night.  He denies pain, numbness or tingling sensation to his legs.  He denies leg weakness or change in balance.  He denies bowel or bladder incontinence.  He denies recent falls, injuries, or ER visits.  There has been no changes since he was last seen.    Patient takes gabapentin 300 mg 3 times a day.  He takes Tylenol on occasion.  He is on Ozempic for weight loss.  He reports his weight has plateaued.  He is still doing physical therapy since December.    I reviewed previous notes and imaging.  I discussed the plan of care including pharmacologic and joint interventional procedure.  I discussed repeating the lumbar RFA.  He had bilateral L4-L5, L5-S1 RFA on 6/24/2024.  He reports it provided more than 50% relief for over 6 months.  He is in agreement to proceed to this procedure but wants to wait until April to help him out throughout the summer.  This is done under fluoroscopy.  Questions were answered during this encounter.     ION was repeated today where he scored 22.  This form was scanned and included in this note.    The patient was counseled regarding diagnostic results, instructions for management, risk factor reductions, risks and benefits of treatment options and importance of compliance with treatment.    ---------------  PROCEDURES:    9/16/2024: Right SI injection  6/24/2024: Bilateral L4-L5, L5-S1 " RFA  3/10/2023: Left shoulder injection  1/24/2023: Bilateral L4-L5, L5-S1 RFA  12/20/2022: Bilateral L4-L5, L5-S1 diagnostic MBB #2  10/10/2022: Bilateral L4-L5, L5-S1 diagnostic MBB #1  -------------    OARRS:  Carlene Uriarte, APRN-CNP, DNP on 2/12/2025  9:17 AM  I have personally reviewed the OARRS report for Vincent Ramirez. I have considered the risks of abuse, dependence, addiction and diversion    Past Medical History  He has a past medical history of Acute sinusitis, unspecified (12/26/2023), Cough (12/26/2023), Diabetes (Multi), and Personal history of non-Hodgkin lymphomas (02/13/2014).    Surgical History  Past Surgical History:   Procedure Laterality Date    APPENDECTOMY  07/27/2021    Appendectomy    HAND SURGERY  02/13/2014    Hand Surgery                                                                                                                                                          HERNIA REPAIR  07/27/2021    Hernia Repair    OTHER SURGICAL HISTORY  02/13/2014    Nasal Endoscopy Polypectomy    OTHER SURGICAL HISTORY  07/27/2021    Throat Surgery    TONSILLECTOMY  07/27/2021    Tonsillectomy        Social History  He reports that he has never smoked. He has never used smokeless tobacco. He reports that he does not currently use alcohol. He reports that he does not use drugs.    Family History  Family History   Problem Relation Name Age of Onset    No Known Problems Other          Allergies  Penicillins    Medications    Current Outpatient Medications:     alfuzosin (Uroxatral) 10 mg 24 hr tablet, Take 1 tablet (10 mg) by mouth once daily. Do not crush, chew, or split., Disp: 90 tablet, Rfl: 1    aspirin 81 mg EC tablet, Take 1 tablet (81 mg) by mouth once daily., Disp: , Rfl:     dicyclomine (Bentyl) 10 mg capsule, Take 1 capsule (10 mg) by mouth once daily as needed., Disp: , Rfl:     [START ON 2/16/2025] ergocalciferol (Vitamin D-2) 1.25 MG (02612 UT) capsule, Take 1 capsule (50,000 Units) by  mouth 1 (one) time per week., Disp: 30 capsule, Rfl: 1    fluticasone (Flonase) 50 mcg/actuation nasal spray, Administer 2 sprays into each nostril once daily. Shake gently. Before first use, prime pump. After use, clean tip and replace cap., Disp: 16 g, Rfl: 1    oxybutynin (Ditropan) 5 mg tablet, Take 1 tablet (5 mg) by mouth 2 times a day., Disp: 180 tablet, Rfl: 0    Ozempic 2 mg/dose (8 mg/3 mL) pen injector, Inject 2 mg under the skin 1 (one) time per week., Disp: 3 mL, Rfl: 3    sertraline (Zoloft) 50 mg tablet, Take 1 tablet (50 mg) by mouth once daily., Disp: 90 tablet, Rfl: 1    traZODone (Desyrel) 100 mg tablet, Take 1 tablet (100 mg) by mouth once daily at bedtime., Disp: 90 tablet, Rfl: 1    gabapentin (Neurontin) 300 mg capsule, Take 1 capsule (300 mg) by mouth 3 times a day., Disp: 270 capsule, Rfl: 0     Review of Systems   Constitutional: Negative.    HENT: Negative.     Eyes: Negative.    Respiratory: Negative.     Cardiovascular: Negative.    Gastrointestinal: Negative.    Endocrine: Negative.    Genitourinary: Negative.    Musculoskeletal:  Positive for arthralgias, back pain and myalgias. Negative for gait problem, joint swelling, neck pain and neck stiffness.   Skin: Negative.    Allergic/Immunologic: Negative.    Neurological: Negative.    Psychiatric/Behavioral: Negative.          Physical Exam  Vitals and nursing note reviewed.   HENT:      Head: Normocephalic.      Nose: Nose normal.   Eyes:      Extraocular Movements: Extraocular movements intact.      Conjunctiva/sclera: Conjunctivae normal.      Pupils: Pupils are equal, round, and reactive to light.   Cardiovascular:      Rate and Rhythm: Normal rate and regular rhythm.   Pulmonary:      Effort: Pulmonary effort is normal.      Breath sounds: Normal breath sounds.   Genitourinary:     Comments: Deferred  Musculoskeletal:         General: Tenderness present. No swelling, deformity or signs of injury.      Cervical back: No rigidity or  "tenderness.      Right lower leg: No edema.      Left lower leg: No edema.      Comments: \"For full disclosure, patient was asked to pull up their garment to properly visualize the spine. This is done by the patient without me touching their garment.  The spine/joints were examined using gloves.\"    Negative leg raise.  Positive for paraspinal tenderness at the lumbar region bilaterally at L4-L5, L5-S1 with rotation.  No radicular symptoms.  BUE 5/5, BLE 5/5.   Skin:     General: Skin is warm and dry.   Neurological:      General: No focal deficit present.      Mental Status: He is alert and oriented to person, place, and time.   Psychiatric:         Mood and Affect: Mood normal.         Behavior: Behavior normal.          Last Recorded Vitals  /85 (BP Location: Left arm, Patient Position: Sitting, BP Cuff Size: Adult)   Pulse 89   Temp 36.7 °C (98.1 °F) (Temporal)   Resp 16   Wt 120 kg (265 lb)   SpO2 95%  Body mass index is 36.96 kg/m².       Pain Management Panel  More data exists         Latest Ref Rng & Units 11/13/2024 6/10/2024   Pain Management Panel   Amphetamine Screen, Urine Presumptive Negative Presumptive Negative  Presumptive Negative    Barbiturate Screen, Urine Presumptive Negative Presumptive Negative  Presumptive Negative    Codeine IgE <50 ng/mL <50  <50    Hydromorphone Urine <25 ng/mL <25  <25    Morphine  <50 ng/mL <50  <50           Relevant Results    Narrative & Impression  MRN: [id number]  Patient Name: MARLYS LIND     STUDY:  MRI L-SPINE WO;  10/15/2022 12:13 pm     INDICATION:  Chronic lower back pain with right-sided leg pain.  No known injury  M54.17: Lumbosacral neuritis.     COMPARISON:  March 2014.     ACCESSION NUMBER(S):  [id number]     ORDERING CLINICIAN:  HEATH LAGOS     TECHNIQUE:  The lumbar spine was studied in the sagital, axial and coronal planes  utiliing T1 and T2 weighted images.     FINDINGS:  The marrow signal and vertebral body height are normal. The conus " and  sacrum are normal.  Images at each interspace reveal the following:  L1/L2  Mild facet hypertrophy without canal or foraminal narrowing  L2/L3  Mild facet hypertrophy without canal or foraminal narrowing  L3/L4  Slight loss of height and signal at the intervertebral disc space.  Minimal bulging intervertebral disc and facet hypertrophy without  canal stenosis. Mild bilateral foraminal narrowing  L4/L5  Circumferential bulging intervertebral disc and bilateral facet  hypertrophy. No measurable canal stenosis. Mild bilateral foraminal  narrowing.  L5/S1  Bilateral facet hypertrophy without canal stenosis. Moderate/advanced  bilateral foraminal narrowing.        IMPRESSION:  * Lumbar spondylosis, as described above, is not measurably changed  compared to the previous exam.     The examination was interpreted [facility]  ----------------       Media Information           Assessment/Plan   Problem List Items Addressed This Visit             ICD-10-CM    Chronic back pain M54.9, G89.29    Relevant Medications    gabapentin (Neurontin) 300 mg capsule    Other Relevant Orders    FL pain management    Radiofrequency Ablation    Facet arthropathy, lumbosacral - Primary M47.817    Relevant Medications    gabapentin (Neurontin) 300 mg capsule    Other Relevant Orders    FL pain management    Radiofrequency Ablation     Other Visit Diagnoses         Codes    Medication management     Z79.899    Relevant Orders    Opiate/Opioid/Benzo Prescription Compliance    Gabapentin,Urine              Plan/Follow-up Instructions:   Continue with your prescribed medications.    Continue taking gabapentin 300 mg 3 times a day.    ---------------    Your next appointment is with Dr. Benoit in:    [facility]    For pain block/injection on: 4/7/2025, bilateral L4-L5, L5-S1 radiofrequency ablation    LOCAL    No aspirin this day    °You will receive a phone call the weekday before your appointment/procedure.    °Please KEEP your scheduled appointments.     °BRING your photo ID, insurance information, and a correct list of your home medications to EVERY visit.    °Please call our office at [phone] if you have any questions.     You will then be seen for a postprocedure follow-up in 8 to 10 weeks  ---------------        Time     Prep time on date of the patient encounter: 2  Time spent directly with patient/family/caregiver: 30   Documentation time: 2  Total time on date of patient encounter: 34      Carlene Uriarte, COLIN, APRN, FNP-C    [facility]  Office #: [phone]  Fax # [phone]    ---------------------  Disclaimer: This note was created using voice recognition software. It was not corrected for typographical or grammatical errors, inadvertent word insertion, or any unintended errors. Please feel free to contact me for clarification.  -----------------

## 2025-02-14 LAB
1OH-MIDAZOLAM UR-MCNC: NEGATIVE NG/ML
7AMINOCLONAZEPAM UR-MCNC: NEGATIVE NG/ML
A-OH ALPRAZ UR-MCNC: NEGATIVE NG/ML
A-OH-TRIAZOLAM UR-MCNC: NEGATIVE NG/ML
AMPHET UR-MCNC: NEGATIVE NG/ML
AMPHETAMINES UR QL: NORMAL NG/ML
BARBITURATES UR QL: NEGATIVE NG/ML
BZE UR QL: NEGATIVE NG/ML
CODEINE UR-MCNC: NEGATIVE NG/ML
CREAT UR-MCNC: 236.5 MG/DL
DRUG SCREEN COMMENT UR-IMP: ABNORMAL
DRUG SCREEN COMMENT UR-IMP: NORMAL
EDDP UR-MCNC: NEGATIVE NG/ML
FENTANYL UR-MCNC: NEGATIVE NG/ML
GABAPENTIN UR-MCNC: ABNORMAL NG/ML
HYDROCODONE UR-MCNC: NEGATIVE NG/ML
HYDROMORPHONE UR-MCNC: NEGATIVE NG/ML
LORAZEPAM UR-MCNC: NEGATIVE NG/ML
METHADONE UR-MCNC: NEGATIVE NG/ML
METHAMPHET UR-MCNC: NEGATIVE NG/ML
MORPHINE UR-MCNC: NEGATIVE NG/ML
NORDIAZEPAM UR-MCNC: NEGATIVE NG/ML
NORFENTANYL UR-MCNC: NEGATIVE NG/ML
NORHYDROCODONE UR CFM-MCNC: NEGATIVE NG/ML
NOROXYCODONE UR CFM-MCNC: NEGATIVE NG/ML
NORTRAMADOL UR-MCNC: NEGATIVE NG/ML
OH-ETHYLFLURAZ UR-MCNC: NEGATIVE NG/ML
OXAZEPAM UR-MCNC: NEGATIVE NG/ML
OXIDANTS UR QL: NEGATIVE MCG/ML
OXYCODONE UR CFM-MCNC: NEGATIVE NG/ML
OXYMORPHONE UR CFM-MCNC: NEGATIVE NG/ML
PCP UR QL: NEGATIVE NG/ML
PH UR: 5.9 [PH] (ref 4.5–9)
QUEST 6 ACETYLMORPHINE: NEGATIVE NG/ML
QUEST NOTES AND COMMENTS: ABNORMAL
QUEST ZOLPIDEM: NEGATIVE NG/ML
TEMAZEPAM UR-MCNC: NEGATIVE NG/ML
THC UR QL: NEGATIVE NG/ML
TRAMADOL UR-MCNC: NEGATIVE NG/ML
ZOLPIDEM PHENYL-4-CARB UR CFM-MCNC: NEGATIVE NG/ML

## 2025-02-18 ENCOUNTER — APPOINTMENT (OUTPATIENT)
Dept: PHYSICAL THERAPY | Facility: HOSPITAL | Age: 75
End: 2025-02-18
Payer: MEDICARE

## 2025-02-18 DIAGNOSIS — M54.9 BACK PAIN: ICD-10-CM

## 2025-02-18 PROCEDURE — 97112 NEUROMUSCULAR REEDUCATION: CPT | Mod: GP

## 2025-02-18 PROCEDURE — 97110 THERAPEUTIC EXERCISES: CPT | Mod: GP

## 2025-02-18 ASSESSMENT — PAIN SCALES - GENERAL: PAINLEVEL_OUTOF10: 4

## 2025-02-18 ASSESSMENT — PAIN - FUNCTIONAL ASSESSMENT: PAIN_FUNCTIONAL_ASSESSMENT: 0-10

## 2025-02-18 NOTE — PROGRESS NOTES
Physical Therapy Treatment and Discharge    Patient Name: Vincent Ramirez  MRN: [id number]  Today's Date: 2/18/2025  Payor: MEDICARE / Plan: MEDICARE PART A AND B / Product Type: *No Product type* /   Time Calculation  Start Time: 1101  Stop Time: 1142  Time Calculation (min): 41 min        PT Therapeutic Procedures Time Entry  Neuromuscular Re-Education Time Entry: 10  Therapeutic Exercise Time Entry: 31        Current Problem  1. Back pain  Follow Up In Physical Therapy        Problem List Items Addressed This Visit    None  Visit Diagnoses         Codes    Back pain     M54.9            General  Reason for Referral: LBP  Referred By: Carlene SOTO CNP  Past Medical History Relevant to Rehab: non hodgkins lymphoma, RA, arthritis    Subjective   Current Condition:   Better  Patient reports he is feeling a little achey from the cold this date. Otherwise feeling good.     Performing HEP?: Yes    Precautions  Precautions  Medical Precautions: No known precautions/limitation  Pain  Pain Assessment: 0-10  0-10 (Numeric) Pain Score: 4  Pain Type: Chronic pain  Pain Location: Back  Pain Orientation: Lower    Objective   Lumbar AROM (%)     Flexion: no limitations  Extension: limited by approx 25%  (L) Side Bend: no limitations  (R) Side Bend: no limitations  (L) Rotation: no limitations  (R) Rotation: no limitations                                                         Strength Testing     Core/Abdominals: 4/5      Hip                             (R)                    (L)  Flexion:            5/5                   5/5                               Extension:        4+/5                 4+/5                     Abduction:       5/5                 5/5             SLS: R leg 5 sec, R leg 7 sec    Outcome Measures:  ION: 44%    Treatments:    Therapeutic Exercise  Therapeutic Exercise Performed: Yes  Therapeutic Exercise Activity 1: bike x6 min as warm up  Therapeutic Exercise Activity 2: recheck  Therapeutic Exercise  Activity 3: HEP as below  Therapeutic Exercise Activity 4: hip abd machine 2x10 w 70#    Balance/Neuromuscular Re-Education  Balance/Neuromuscular Re-Education Activity 1: fwd leans and OH reaches w 1 UE holding 2# MB in low squat position on airex pad. 1x10 ea side  Balance/Neuromuscular Re-Education Activity 2: recheck    EDUCATION:   Individual(s) Educated: Patient  Education Provided: HEP, POC  Handout(s) Provided: Scanned into chart  Home Program:     Access Code: [email or web address]  URL: [email or web address]  Date: 02/18/2025  Prepared by: Lorena Serrato    Exercises  - Bird Dog  - 1-2 x daily - 7 x weekly - 2 sets - 10 reps  - Quadruped Hip Extension with Mini Swiss Ball  - 1-2 x daily - 7 x weekly - 2 sets - 10 reps  - Quadruped Cat Cow  - 1-2 x daily - 7 x weekly - 2 sets - 10 reps  - Tandem Walking  - 1-2 x daily - 7 x weekly - 2 sets - 10 reps  - Single Leg Balance in March Position  - 1-2 x daily - 7 x weekly - 3 reps - 20 sec hold    Risk and Benefits Discussed with Patient/Caregiver/Other: Yes   Patient/Caregiver Demonstrated Understanding: Yes   Patient Response to Education: Patient/Caregiver verbalized understanding of information, Patient/Caregiver performed return demonstration of exercises/activities, and Patient/Caregiver asked appropriate questions    Assessment:   Pt has made significant progress since beginning PT. Pt states that she no longer has difficulties w strength or ROM at this time. Pt has met all goals. D/t pt progress, pt will be DC from OPPT services this date. Pt edu on performing HEP for next few weeks to maintain progress and pt verbalized understanding.       Plan: DC from OPPT  OP PT Plan  Treatment/Interventions: Education/ Instruction, Manual therapy, Neuromuscular re-education, Therapeutic activities, Therapeutic exercises  PT Plan: Skilled PT  PT Frequency: 2 times per week  Duration: 4 weeks  Onset Date: 11/19/04  Certification Period Start Date:  01/17/25  Certification Period End Date: 02/14/25  Number of Treatments Authorized: 6/8  Rehab Potential: Good  Plan of Care Agreement: Patient    Goals:  Active       PT Problem       Pt will improve core strength by 1 grade or more as evidence of improved functional mobility.   (Met)       Start:  11/19/24    Expected End:  02/14/25    Resolved:  02/18/25         Pt will improve lauren hip ext/abd strength by 1 grade or more as evidence of improved functional mobility.   (Met)       Start:  11/19/24    Expected End:  02/14/25    Resolved:  02/18/25         Pt will be indep and compliant in administering HEP  (Met)       Start:  11/19/24    Expected End:  12/17/24    Resolved:  12/20/24         Pt will report decreased pain to no more than </=3/10.  (Met)       Start:  11/19/24    Expected End:  12/17/24    Resolved:  12/20/24         Pt will improve ION score to <38% as evidence of improved functional ability.  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

## 2025-02-26 ENCOUNTER — APPOINTMENT (OUTPATIENT)
Dept: PRIMARY CARE | Facility: CLINIC | Age: 75
End: 2025-02-26
Payer: MEDICARE

## 2025-03-05 DIAGNOSIS — H04.129 DRY EYE: ICD-10-CM

## 2025-03-05 RX ORDER — TOBRAMYCIN AND DEXAMETHASONE 3; 1 MG/ML; MG/ML
1 SUSPENSION/ DROPS OPHTHALMIC
COMMUNITY
End: 2025-03-05 | Stop reason: SDUPTHER

## 2025-03-05 RX ORDER — TOBRAMYCIN AND DEXAMETHASONE 3; 1 MG/ML; MG/ML
1 SUSPENSION/ DROPS OPHTHALMIC
Qty: 10 ML | Refills: 1 | Status: SHIPPED | OUTPATIENT
Start: 2025-03-05

## 2025-03-06 ENCOUNTER — APPOINTMENT (OUTPATIENT)
Dept: PRIMARY CARE | Facility: CLINIC | Age: 75
End: 2025-03-06
Payer: MEDICARE

## 2025-03-11 PROBLEM — G47.33 OSA (OBSTRUCTIVE SLEEP APNEA): Status: RESOLVED | Noted: 2023-08-30 | Resolved: 2025-03-11

## 2025-03-12 ENCOUNTER — APPOINTMENT (OUTPATIENT)
Dept: PRIMARY CARE | Facility: CLINIC | Age: 75
End: 2025-03-12
Payer: MEDICARE

## 2025-03-12 VITALS
SYSTOLIC BLOOD PRESSURE: 104 MMHG | BODY MASS INDEX: 36.83 KG/M2 | DIASTOLIC BLOOD PRESSURE: 67 MMHG | WEIGHT: 264.1 LBS | OXYGEN SATURATION: 92 % | HEART RATE: 89 BPM

## 2025-03-12 DIAGNOSIS — E11.00 TYPE 2 DIABETES MELLITUS WITH HYPEROSMOLARITY WITHOUT COMA, WITHOUT LONG-TERM CURRENT USE OF INSULIN (MULTI): ICD-10-CM

## 2025-03-12 DIAGNOSIS — E55.9 VITAMIN D DEFICIENCY: ICD-10-CM

## 2025-03-12 DIAGNOSIS — E11.69 TYPE 2 DIABETES MELLITUS WITH OTHER SPECIFIED COMPLICATION, WITHOUT LONG-TERM CURRENT USE OF INSULIN: ICD-10-CM

## 2025-03-12 DIAGNOSIS — E78.00 PURE HYPERCHOLESTEROLEMIA: ICD-10-CM

## 2025-03-12 DIAGNOSIS — E78.5 DYSLIPIDEMIA: ICD-10-CM

## 2025-03-12 DIAGNOSIS — I10 BENIGN ESSENTIAL HYPERTENSION: ICD-10-CM

## 2025-03-12 LAB — POC HEMOGLOBIN A1C: 5.8 % (ref 4.2–6.5)

## 2025-03-12 PROCEDURE — 99214 OFFICE O/P EST MOD 30 MIN: CPT

## 2025-03-12 PROCEDURE — 1159F MED LIST DOCD IN RCRD: CPT

## 2025-03-12 PROCEDURE — 1123F ACP DISCUSS/DSCN MKR DOCD: CPT

## 2025-03-12 PROCEDURE — 1157F ADVNC CARE PLAN IN RCRD: CPT

## 2025-03-12 PROCEDURE — 1125F AMNT PAIN NOTED PAIN PRSNT: CPT

## 2025-03-12 PROCEDURE — 1160F RVW MEDS BY RX/DR IN RCRD: CPT

## 2025-03-12 PROCEDURE — 83036 HEMOGLOBIN GLYCOSYLATED A1C: CPT

## 2025-03-12 PROCEDURE — 3078F DIAST BP <80 MM HG: CPT

## 2025-03-12 PROCEDURE — 3074F SYST BP LT 130 MM HG: CPT

## 2025-03-12 ASSESSMENT — PAIN SCALES - GENERAL: PAINLEVEL_OUTOF10: 5

## 2025-03-12 NOTE — PROGRESS NOTES
Subjective   Patient ID: Car Ramirez is a 74 y.o. male who presents for Diabetes.    WILLY Yoon is here for follow up for diabetes     DM2- A1C 5.8, has increased slightly.  Feels the ozempic is not working and that he feels he is gaining weight. He is not following a diabetic, heart healthy, or mediterranean diet, nor is he exercising. He is eating a lot of sweets.     Review of Systems          Objective   /67 (BP Location: Left arm)   Pulse 89   Wt 120 kg (264 lb 1.6 oz)   SpO2 92%   BMI 36.83 kg/m²     Physical Exam  Vitals reviewed.   Constitutional:       Appearance: Normal appearance. He is obese.   HENT:      Mouth/Throat:      Mouth: Mucous membranes are moist.      Pharynx: Oropharynx is clear.   Cardiovascular:      Rate and Rhythm: Normal rate and regular rhythm.      Pulses: Normal pulses.      Heart sounds: Normal heart sounds.   Pulmonary:      Effort: Pulmonary effort is normal.      Breath sounds: Normal breath sounds.   Abdominal:      General: Bowel sounds are normal.   Musculoskeletal:      Cervical back: Normal range of motion.   Skin:     General: Skin is warm.   Neurological:      Mental Status: He is alert and oriented to person, place, and time.   Psychiatric:         Mood and Affect: Mood normal.         Behavior: Behavior normal.             Assessment/Plan   Diagnoses and all orders for this visit:  Vitamin D deficiency  -     CBC and Auto Differential; Future  -     Comprehensive metabolic panel; Future  -     Lipid panel; Future  -     Urinalysis with Reflex Microscopic; Future  -     Vitamin D 25-Hydroxy,Total (for eval of Vitamin D levels); Future  Benign essential hypertension  -     CBC and Auto Differential; Future  -     Comprehensive metabolic panel; Future  -     Lipid panel; Future  -     Urinalysis with Reflex Microscopic; Future  -     Vitamin D 25-Hydroxy,Total (for eval of Vitamin D levels); Future  Dyslipidemia  -     CBC and Auto Differential; Future  -      Comprehensive metabolic panel; Future  -     Lipid panel; Future  -     Urinalysis with Reflex Microscopic; Future  -     Vitamin D 25-Hydroxy,Total (for eval of Vitamin D levels); Future  Pure hypercholesterolemia  -     CBC and Auto Differential; Future  -     Comprehensive metabolic panel; Future  -     Lipid panel; Future  -     Urinalysis with Reflex Microscopic; Future  -     Vitamin D 25-Hydroxy,Total (for eval of Vitamin D levels); Future  Type 2 diabetes mellitus with hyperosmolarity without coma, without long-term current use of insulin (Multi)  -     Continue Ozempic  -     Start using elliptical at home   -     Follow diabetic diet  -     Urinalysis with Reflex Microscopic; Future        -     POCT glycosylated hemoglobin (Hb A1C) manually resulted      HCM  Follow up for diabetic eye exam  Podiatry follow up needed  A1C in 3 months  Weight loss strategies

## 2025-03-15 DIAGNOSIS — E11.69 TYPE 2 DIABETES MELLITUS WITH OTHER SPECIFIED COMPLICATION, WITHOUT LONG-TERM CURRENT USE OF INSULIN: ICD-10-CM

## 2025-03-16 RX ORDER — SEMAGLUTIDE 2.68 MG/ML
INJECTION, SOLUTION SUBCUTANEOUS
Qty: 3 ML | Refills: 3 | Status: SHIPPED | OUTPATIENT
Start: 2025-03-16

## 2025-03-31 ENCOUNTER — HOSPITAL ENCOUNTER (OUTPATIENT)
Dept: PAIN MEDICINE | Facility: HOSPITAL | Age: 75
Discharge: HOME | End: 2025-03-31
Payer: MEDICARE

## 2025-03-31 VITALS
TEMPERATURE: 97.1 F | BODY MASS INDEX: 35 KG/M2 | DIASTOLIC BLOOD PRESSURE: 89 MMHG | OXYGEN SATURATION: 92 % | RESPIRATION RATE: 16 BRPM | HEIGHT: 71 IN | HEART RATE: 91 BPM | SYSTOLIC BLOOD PRESSURE: 139 MMHG | WEIGHT: 250 LBS

## 2025-03-31 DIAGNOSIS — M47.817 FACET ARTHROPATHY, LUMBOSACRAL: ICD-10-CM

## 2025-03-31 DIAGNOSIS — G89.29 CHRONIC BILATERAL LOW BACK PAIN WITHOUT SCIATICA: ICD-10-CM

## 2025-03-31 DIAGNOSIS — M54.50 CHRONIC BILATERAL LOW BACK PAIN WITHOUT SCIATICA: ICD-10-CM

## 2025-03-31 PROCEDURE — 64635 DESTROY LUMB/SAC FACET JNT: CPT | Performed by: ANESTHESIOLOGY

## 2025-03-31 PROCEDURE — 64636 DESTROY L/S FACET JNT ADDL: CPT | Performed by: ANESTHESIOLOGY

## 2025-03-31 PROCEDURE — 64636 DESTROY L/S FACET JNT ADDL: CPT | Mod: 50 | Performed by: ANESTHESIOLOGY

## 2025-03-31 PROCEDURE — 2500000004 HC RX 250 GENERAL PHARMACY W/ HCPCS (ALT 636 FOR OP/ED): Performed by: ANESTHESIOLOGY

## 2025-03-31 PROCEDURE — 2720000007 HC OR 272 NO HCPCS

## 2025-03-31 PROCEDURE — 64635 DESTROY LUMB/SAC FACET JNT: CPT | Mod: 50 | Performed by: ANESTHESIOLOGY

## 2025-03-31 RX ORDER — LIDOCAINE HYDROCHLORIDE 10 MG/ML
INJECTION, SOLUTION EPIDURAL; INFILTRATION; INTRACAUDAL; PERINEURAL AS NEEDED
Status: COMPLETED | OUTPATIENT
Start: 2025-03-31 | End: 2025-03-31

## 2025-03-31 RX ORDER — METHYLPREDNISOLONE ACETATE 80 MG/ML
INJECTION, SUSPENSION INTRA-ARTICULAR; INTRALESIONAL; INTRAMUSCULAR; SOFT TISSUE AS NEEDED
Status: COMPLETED | OUTPATIENT
Start: 2025-03-31 | End: 2025-03-31

## 2025-03-31 RX ORDER — BUPIVACAINE HYDROCHLORIDE 2.5 MG/ML
INJECTION, SOLUTION EPIDURAL; INFILTRATION; INTRACAUDAL; PERINEURAL AS NEEDED
Status: COMPLETED | OUTPATIENT
Start: 2025-03-31 | End: 2025-03-31

## 2025-03-31 RX ORDER — LIDOCAINE HYDROCHLORIDE 20 MG/ML
INJECTION, SOLUTION EPIDURAL; INFILTRATION; INTRACAUDAL; PERINEURAL AS NEEDED
Status: COMPLETED | OUTPATIENT
Start: 2025-03-31 | End: 2025-03-31

## 2025-03-31 RX ADMIN — LIDOCAINE HYDROCHLORIDE 10 ML: 20 INJECTION, SOLUTION EPIDURAL; INFILTRATION; INTRACAUDAL; PERINEURAL at 10:01

## 2025-03-31 RX ADMIN — METHYLPREDNISOLONE ACETATE 80 MG: 80 INJECTION, SUSPENSION INTRA-ARTICULAR; INTRALESIONAL; INTRAMUSCULAR; SOFT TISSUE at 10:02

## 2025-03-31 RX ADMIN — LIDOCAINE HYDROCHLORIDE 20 ML: 10 INJECTION, SOLUTION EPIDURAL; INFILTRATION; INTRACAUDAL; PERINEURAL at 10:01

## 2025-03-31 RX ADMIN — BUPIVACAINE HYDROCHLORIDE 10 ML: 2.5 INJECTION, SOLUTION EPIDURAL; INFILTRATION; INTRACAUDAL; PERINEURAL at 10:01

## 2025-03-31 ASSESSMENT — COLUMBIA-SUICIDE SEVERITY RATING SCALE - C-SSRS
2. HAVE YOU ACTUALLY HAD ANY THOUGHTS OF KILLING YOURSELF?: NO
6. HAVE YOU EVER DONE ANYTHING, STARTED TO DO ANYTHING, OR PREPARED TO DO ANYTHING TO END YOUR LIFE?: NO
1. IN THE PAST MONTH, HAVE YOU WISHED YOU WERE DEAD OR WISHED YOU COULD GO TO SLEEP AND NOT WAKE UP?: NO

## 2025-03-31 ASSESSMENT — ENCOUNTER SYMPTOMS
ARTHRALGIAS: 0
WOUND: 0
NEUROLOGICAL NEGATIVE: 1
RESPIRATORY NEGATIVE: 1
CHILLS: 0
DIAPHORESIS: 0
FEVER: 0
CARDIOVASCULAR NEGATIVE: 1
DIARRHEA: 0
BACK PAIN: 1
SHORTNESS OF BREATH: 0
NAUSEA: 0
MYALGIAS: 1
PSYCHIATRIC NEGATIVE: 1
VOMITING: 0
FATIGUE: 0

## 2025-03-31 ASSESSMENT — PAIN SCALES - GENERAL
PAINLEVEL_OUTOF10: 0 - NO PAIN
PAINLEVEL_OUTOF10: 8

## 2025-03-31 ASSESSMENT — PAIN - FUNCTIONAL ASSESSMENT
PAIN_FUNCTIONAL_ASSESSMENT: 0-10
PAIN_FUNCTIONAL_ASSESSMENT: 0-10

## 2025-03-31 ASSESSMENT — PAIN DESCRIPTION - DESCRIPTORS: DESCRIPTORS: ACHING

## 2025-03-31 NOTE — DISCHARGE INSTRUCTIONS
Discharge Instructions:   ° Keep Band-Aid on for the next 24 hours.    ° No showering/bathing for the next 24 hours.    ° You may notice soreness or increased pain in the area of your injection, which may continue for the first 48 hours.    ° Avoid driving or operating any heavy machinery until the next day after the procedure.  ° You should resume any medications and your regular diet after the procedure.  ° You may resume regular daily activity but should avoid strenuous activity the day of the procedure.  Some of the side affects you may experience from the steroids are:  ° Insomnia (inability to sleep)  ° Increased sweating  ° Headaches  ° Increased fluid retention (swelling of your extremities)  ° Increase appetite  ° Face flushing  ° If you are a diabetic, your blood sugars may go up.  Closely monitor your diet.  Your blood sugar should return to normal in a few days.  Complications:   ° Complications are rare with the most common being temporary increase pain near the injection site. You can apply ice to affected area on the day of the procedure.   ° If the discomfort persists, apply moist heat to the area. Serious complications are very uncommon but may include bleeding, infection or nerve damage.   ° If severe pain, fever, redness or swelling near the injection site, have someone take you to the nearest emergency room to be evaluated for procedure complications or infection.  Expectations:   ° Local anesthetics wear off in several hours but duration of relief varies from individual to individual.     If you have any questions, please call the office at  .    If this is an emergency, call 911 or go to your nearest hospital.

## 2025-03-31 NOTE — H&P
"History Of Present Illness  Vincent Ramirez \"Car\" is a 74 y.o. male presenting with lumbosacral facet arthropathy and chronic bilateral low back pain without sciatica. Here for bilateral L4-L5, L5-S1 RFA with local. Last R SI injection on 9/16/24 provided about month of relief. LBP is worse on R side. No numbness/tingling. No ASA today. Denies fever, chills, SoB, CP.     Past Medical History  Past Medical History:   Diagnosis Date    Acute sinusitis, unspecified 12/26/2023    Cough 12/26/2023    Diabetes (Multi)     Personal history of non-Hodgkin lymphomas 02/13/2014    History of non-Hodgkin's lymphoma    Prostate cancer (Multi)        Surgical History  Past Surgical History:   Procedure Laterality Date    APPENDECTOMY  07/27/2021    Appendectomy    HAND SURGERY  02/13/2014    Hand Surgery                                                                                                                                                          HERNIA REPAIR  07/27/2021    Hernia Repair    OTHER SURGICAL HISTORY  02/13/2014    Nasal Endoscopy Polypectomy    OTHER SURGICAL HISTORY  07/27/2021    Throat Surgery    TONSILLECTOMY  07/27/2021    Tonsillectomy        Social History  He reports that he has never smoked. He has never used smokeless tobacco. He reports that he does not currently use alcohol. He reports that he does not use drugs.    Family History  Family History   Problem Relation Name Age of Onset    No Known Problems Other          Allergies  Penicillins    Current Outpatient Medications on File Prior to Encounter   Medication Sig Dispense Refill    alfuzosin (Uroxatral) 10 mg 24 hr tablet Take 1 tablet (10 mg) by mouth once daily. Do not crush, chew, or split. 90 tablet 1    aspirin 81 mg EC tablet Take 1 tablet (81 mg) by mouth once daily.      dicyclomine (Bentyl) 10 mg capsule Take 1 capsule (10 mg) by mouth once daily as needed.      ergocalciferol (Vitamin D-2) 1.25 MG (08487 UT) capsule Take 1 " capsule (50,000 Units) by mouth 1 (one) time per week. 30 capsule 1    fluticasone (Flonase) 50 mcg/actuation nasal spray Administer 2 sprays into each nostril once daily. Shake gently. Before first use, prime pump. After use, clean tip and replace cap. 16 g 1    gabapentin (Neurontin) 300 mg capsule Take 1 capsule (300 mg) by mouth 3 times a day. 270 capsule 0    oxybutynin (Ditropan) 5 mg tablet Take 1 tablet (5 mg) by mouth 2 times a day. 180 tablet 0    Ozempic 2 mg/dose (8 mg/3 mL) pen injector Inject 2 mg under the skin 1 (one) time per week. 3 mL 3    sertraline (Zoloft) 50 mg tablet Take 1 tablet (50 mg) by mouth once daily. 90 tablet 1    tobramycin-dexamethasone (Tobradex) ophthalmic suspension Administer 1 drop into both eyes every 4 hours while awake. 10 mL 1    traZODone (Desyrel) 100 mg tablet Take 1 tablet (100 mg) by mouth once daily at bedtime. 90 tablet 1     No current facility-administered medications on file prior to encounter.      Review of Systems   Constitutional:  Negative for chills, diaphoresis, fatigue and fever.   HENT: Negative.     Respiratory: Negative.  Negative for shortness of breath.    Cardiovascular: Negative.  Negative for chest pain.   Gastrointestinal:  Negative for diarrhea, nausea and vomiting.   Musculoskeletal:  Positive for back pain and myalgias. Negative for arthralgias and gait problem.   Skin:  Negative for pallor, rash and wound.   Neurological: Negative.    Psychiatric/Behavioral: Negative.          Physical Exam  Constitutional:       General: He is not in acute distress.     Appearance: Normal appearance. He is not ill-appearing.   HENT:      Head: Normocephalic.   Eyes:      General: No scleral icterus.     Conjunctiva/sclera: Conjunctivae normal.      Pupils: Pupils are equal, round, and reactive to light.   Cardiovascular:      Rate and Rhythm: Normal rate and regular rhythm.   Pulmonary:      Effort: Pulmonary effort is normal. No respiratory distress.       Breath sounds: Normal breath sounds.   Musculoskeletal:      Cervical back: Normal range of motion and neck supple.      Lumbar back: Tenderness present. No swelling or deformity.      Comments: Point tenderness R lower back.   Skin:     General: Skin is warm and dry.   Neurological:      General: No focal deficit present.      Mental Status: He is alert and oriented to person, place, and time.   Psychiatric:         Mood and Affect: Mood normal.         Behavior: Behavior normal.          Last Recorded Vitals  Vitals:    03/31/25 0935   BP: 128/78   Pulse: 84   Resp: 17   Temp: 36.1 °C (96.9 °F)   SpO2: 94%        Relevant Results  MRI L-SPINE WO;  10/15/2022 12:13 pm     INDICATION:  Chronic lower back pain with right-sided leg pain.  No known injury  M54.17: Lumbosacral neuritis.     COMPARISON:  March 2014.     ACCESSION NUMBER(S):  [id number]     ORDERING CLINICIAN:  HEATH LAGOS     TECHNIQUE:  The lumbar spine was studied in the sagital, axial and coronal planes  utiliing T1 and T2 weighted images.     FINDINGS:  The marrow signal and vertebral body height are normal. The conus and  sacrum are normal.  Images at each interspace reveal the following:  L1/L2  Mild facet hypertrophy without canal or foraminal narrowing  L2/L3  Mild facet hypertrophy without canal or foraminal narrowing  L3/L4  Slight loss of height and signal at the intervertebral disc space.  Minimal bulging intervertebral disc and facet hypertrophy without  canal stenosis. Mild bilateral foraminal narrowing  L4/L5  Circumferential bulging intervertebral disc and bilateral facet  hypertrophy. No measurable canal stenosis. Mild bilateral foraminal  narrowing.  L5/S1  Bilateral facet hypertrophy without canal stenosis. Moderate/advanced  bilateral foraminal narrowing.        IMPRESSION:  * Lumbar spondylosis, as described above, is not measurably changed  compared to the previous exam.     Assessment/Plan   Assessment & Plan  Facet arthropathy,  lumbosacral    Chronic bilateral low back pain without sciatica      Bilateral L4-L5, L5-S1 RFA with local       I spent 20 minutes in the professional and overall care of this patient.      Anthony Ramirez PA-C

## 2025-04-07 ENCOUNTER — APPOINTMENT (OUTPATIENT)
Dept: PAIN MEDICINE | Facility: HOSPITAL | Age: 75
End: 2025-04-07
Payer: MEDICARE

## 2025-04-12 DIAGNOSIS — G47.9 SLEEP DISORDER, UNSPECIFIED: ICD-10-CM

## 2025-04-14 RX ORDER — TRAZODONE HYDROCHLORIDE 100 MG/1
100 TABLET ORAL NIGHTLY
Qty: 90 TABLET | Refills: 1 | Status: SHIPPED | OUTPATIENT
Start: 2025-04-14 | End: 2025-10-11

## 2025-05-16 DIAGNOSIS — F32.1 CURRENT MODERATE EPISODE OF MAJOR DEPRESSIVE DISORDER WITHOUT PRIOR EPISODE (MULTI): ICD-10-CM

## 2025-05-16 DIAGNOSIS — C61 PROSTATE CANCER (MULTI): ICD-10-CM

## 2025-05-16 DIAGNOSIS — N40.0 BENIGN PROSTATIC HYPERPLASIA, UNSPECIFIED WHETHER LOWER URINARY TRACT SYMPTOMS PRESENT: ICD-10-CM

## 2025-05-19 RX ORDER — SERTRALINE HYDROCHLORIDE 50 MG/1
50 TABLET, FILM COATED ORAL DAILY
Qty: 90 TABLET | Refills: 1 | Status: SHIPPED | OUTPATIENT
Start: 2025-05-19

## 2025-05-19 RX ORDER — ALFUZOSIN HYDROCHLORIDE 10 MG/1
10 TABLET, EXTENDED RELEASE ORAL DAILY
Qty: 90 TABLET | Refills: 1 | Status: SHIPPED | OUTPATIENT
Start: 2025-05-19

## 2025-05-27 ENCOUNTER — OFFICE VISIT (OUTPATIENT)
Dept: PAIN MEDICINE | Facility: HOSPITAL | Age: 75
End: 2025-05-27
Payer: MEDICARE

## 2025-05-27 VITALS
DIASTOLIC BLOOD PRESSURE: 78 MMHG | WEIGHT: 255 LBS | BODY MASS INDEX: 35.7 KG/M2 | TEMPERATURE: 98 F | HEIGHT: 71 IN | OXYGEN SATURATION: 96 % | SYSTOLIC BLOOD PRESSURE: 129 MMHG | RESPIRATION RATE: 15 BRPM | HEART RATE: 96 BPM

## 2025-05-27 DIAGNOSIS — G89.29 CHRONIC BILATERAL LOW BACK PAIN WITHOUT SCIATICA: ICD-10-CM

## 2025-05-27 DIAGNOSIS — Z79.899 MEDICATION MANAGEMENT: ICD-10-CM

## 2025-05-27 DIAGNOSIS — M47.817 FACET ARTHROPATHY, LUMBOSACRAL: Primary | ICD-10-CM

## 2025-05-27 DIAGNOSIS — M54.50 CHRONIC BILATERAL LOW BACK PAIN WITHOUT SCIATICA: ICD-10-CM

## 2025-05-27 PROCEDURE — 1125F AMNT PAIN NOTED PAIN PRSNT: CPT | Performed by: NURSE PRACTITIONER

## 2025-05-27 PROCEDURE — 1036F TOBACCO NON-USER: CPT | Performed by: NURSE PRACTITIONER

## 2025-05-27 PROCEDURE — G2211 COMPLEX E/M VISIT ADD ON: HCPCS | Performed by: NURSE PRACTITIONER

## 2025-05-27 PROCEDURE — 99213 OFFICE O/P EST LOW 20 MIN: CPT | Performed by: NURSE PRACTITIONER

## 2025-05-27 PROCEDURE — 3044F HG A1C LEVEL LT 7.0%: CPT | Performed by: NURSE PRACTITIONER

## 2025-05-27 PROCEDURE — 3078F DIAST BP <80 MM HG: CPT | Performed by: NURSE PRACTITIONER

## 2025-05-27 PROCEDURE — 1160F RVW MEDS BY RX/DR IN RCRD: CPT | Performed by: NURSE PRACTITIONER

## 2025-05-27 PROCEDURE — 1159F MED LIST DOCD IN RCRD: CPT | Performed by: NURSE PRACTITIONER

## 2025-05-27 PROCEDURE — 3074F SYST BP LT 130 MM HG: CPT | Performed by: NURSE PRACTITIONER

## 2025-05-27 RX ORDER — GABAPENTIN 300 MG/1
300 CAPSULE ORAL NIGHTLY
Qty: 90 CAPSULE | Refills: 0 | Status: SHIPPED | OUTPATIENT
Start: 2025-05-27

## 2025-05-27 ASSESSMENT — PAIN SCALES - GENERAL: PAINLEVEL_OUTOF10: 5

## 2025-05-27 ASSESSMENT — ENCOUNTER SYMPTOMS
ARTHRALGIAS: 1
RESPIRATORY NEGATIVE: 1
JOINT SWELLING: 0
PSYCHIATRIC NEGATIVE: 1
MYALGIAS: 1
NEUROLOGICAL NEGATIVE: 1
GASTROINTESTINAL NEGATIVE: 1
CARDIOVASCULAR NEGATIVE: 1
ENDOCRINE NEGATIVE: 1
ALLERGIC/IMMUNOLOGIC NEGATIVE: 1
EYES NEGATIVE: 1
NECK STIFFNESS: 0
CONSTITUTIONAL NEGATIVE: 1
BACK PAIN: 1
NECK PAIN: 0

## 2025-05-27 NOTE — PATIENT INSTRUCTIONS
Continue with your prescribed medications.    Continue taking gabapentin.  I decrease it to 300 mg at bedtime.    I will see you for your follow-up visit in 3 months

## 2025-05-27 NOTE — PROGRESS NOTES
Last urine drug screening date/ordered today: 2/12/2025  Results of last screen: as expected. Updated today      Opioid Risk Screening:   THE OPIOID RISK TOOL (ORT)                                                                      Female                     Male     1. Family History of Substance Abuse:                              Alcohol                                                   [1]=                           [3]  = 0    Illicit Drugs                                             [2] =                          [3]   =0    Prescription Drugs                                [4]=                           [4]   =0    2. Personal History of Substance Abuse:     Alcohol                                                    [3] =                          [3] = 0    Illegal Drugs                                           [4] =                           [4]  = 0    Prescription Drugs                                [5]=                            [5]   =0  0  3. Age (If between 16 to 45):               [1]=                           [1]   =0  0  4. History of Preadolescent Sexual Abuse                                                                  [3]=                            [0]   =0    5. Psychological Disease:    ADD, OCD, Bipolar, Schizophrenia    [2]=                            [2]   =0    Depression                                          [1]=                             [1]   =0      TOTAL Score =  0     Last opioid risk screening date/ordered today: 5/27/2025  Patient's total score is 0    Reference :  Low Score = 0 to 3  Moderate Score = 4 to 7  High Score = =8       Pain Scale Screening:   Pain Assessment and Documentation Tool (PADT)   Date of Assessment: 5/27/2025  Analgesia:   Patient reports her pain level on average during the past week is 0on a 0 - 10 scale.   Patient reports that her pain level at its worst during the past week was 1 on a 0 -10 scale.   100% of pain has been relieved during  the past week per patient   Patient states that the amount of pain relief she is now obtaining from her current pain reliever(s) is enough to make a real difference in her life.   Query to clinician: Is the patient's pain relief clinically significant? yes  Activities of Daily Living:   Physical functioning: unchanged  Family relationships: unchanged  Social relationships: unchanged  Mood: better  Sleep patterns: unchanged  Overall functioning: better  Adverse Events: No, Vincent Ramirez is not experiencing side effects from current pain reliever.  Patients overall severity of side effect:none  Specific Analgesic Plan: Continue present regimen.

## 2025-05-27 NOTE — PROGRESS NOTES
"History Of Present Illness  Vincent Ramirez \"Car\" is a pleasant 75 y.o. male who is here for postprocedure follow-up.  Patient is ambulatory without assistive device but gait is steady.  He arrives by himself.    Patient had bilateral L4-L5, L5-S1 RFA on 3/31/2025.  The procedure has improved his back pain.  He reports it provided 90% relief.  He is still feeling better.  The procedure has improved his pain, ability to participate in his daily activities, ability to participate in physical therapy and ability to enjoy social activities.    Patient continues to have minimal chronic lower back pain.  He rates his pain as 1 out of 10 more so with activities.  Pain comes and goes.  He describes his pain as aching.  He denies pain, numbness or tingling sensation to his legs.  He denies leg weakness or change in balance.  He denies bowel or bladder incontinence.  He denies recent falls, injuries, or ER visits.  There has been no changes since he was last seen.    Patient reports that 3 weeks ago he had an issue with constipation.  He stopped taking his gabapentin and Ozempic thinking it is from that.  He was taking Metamucil and his constipation has been resolved.  I encouraged him to continue taking Metamucil every day as part of his routine.  Patient voiced understanding.  He had physical therapy and continues to do his home stretching exercise.    I reviewed previous notes, labs and imaging.  I discussed plan of care.  I will see him for his follow-up visit.  Questions were answered during this encounter.    The patient was counseled regarding diagnostic results, instructions for management, risk factor reductions, risks and benefits of treatment options and importance of compliance with treatment.    ---------------  PROCEDURES:    3/31/2025: Bilateral L4-L5, L5-S1 RFA  9/16/2024: Right SI injection  6/24/2024: Bilateral L4-L5, L5-S1 RFA  3/10/2023: Left shoulder injection  1/24/2023: Bilateral L4-L5, L5-S1 " RFA  12/20/2022: Bilateral L4-L5, L5-S1 diagnostic MBB #2  10/10/2022: Bilateral L4-L5, L5-S1 diagnostic MBB #1  -------------    OARRS:  Carlene Uriarte, APRN-CNP, DNP on 5/27/2025  9:00 AM  I have personally reviewed the OARRS report for Vincent Ramirez. I have considered the risks of abuse, dependence, addiction and diversion    Past Medical History  He has a past medical history of Acute sinusitis, unspecified (12/26/2023), Cough (12/26/2023), Diabetes (Multi), Personal history of non-Hodgkin lymphomas (02/13/2014), and Prostate cancer (Multi).    Surgical History  Past Surgical History:   Procedure Laterality Date    APPENDECTOMY  07/27/2021    Appendectomy    HAND SURGERY  02/13/2014    Hand Surgery                                                                                                                                                          HERNIA REPAIR  07/27/2021    Hernia Repair    OTHER SURGICAL HISTORY  02/13/2014    Nasal Endoscopy Polypectomy    OTHER SURGICAL HISTORY  07/27/2021    Throat Surgery    TONSILLECTOMY  07/27/2021    Tonsillectomy        Social History  He reports that he has never smoked. He has never used smokeless tobacco. He reports that he does not currently use alcohol. He reports that he does not use drugs.    Family History  Family History   Problem Relation Name Age of Onset    No Known Problems Other          Allergies  Penicillins    Medications    Current Outpatient Medications:     alfuzosin (Uroxatral) 10 mg 24 hr tablet, Take 1 tablet (10 mg) by mouth once daily. Do not crush, chew, or split., Disp: 90 tablet, Rfl: 1    aspirin 81 mg EC tablet, Take 1 tablet (81 mg) by mouth once daily., Disp: , Rfl:     dicyclomine (Bentyl) 10 mg capsule, Take 1 capsule (10 mg) by mouth once daily as needed., Disp: , Rfl:     ergocalciferol (Vitamin D-2) 1.25 MG (21991 UT) capsule, Take 1 capsule (50,000 Units) by mouth 1 (one) time per week., Disp: 30 capsule, Rfl: 1    fluticasone  (Flonase) 50 mcg/actuation nasal spray, Administer 2 sprays into each nostril once daily. Shake gently. Before first use, prime pump. After use, clean tip and replace cap., Disp: 16 g, Rfl: 1    gabapentin (Neurontin) 300 mg capsule, Take 1 capsule (300 mg) by mouth once daily at bedtime., Disp: 90 capsule, Rfl: 0    oxybutynin (Ditropan) 5 mg tablet, Take 1 tablet (5 mg) by mouth 2 times a day., Disp: 180 tablet, Rfl: 0    Ozempic 2 mg/dose (8 mg/3 mL) pen injector, Inject 2 mg under the skin 1 (one) time per week., Disp: 3 mL, Rfl: 3    sertraline (Zoloft) 50 mg tablet, Take 1 tablet (50 mg) by mouth once daily., Disp: 90 tablet, Rfl: 1    tobramycin-dexamethasone (Tobradex) ophthalmic suspension, Administer 1 drop into both eyes every 4 hours while awake., Disp: 10 mL, Rfl: 1    traZODone (Desyrel) 100 mg tablet, Take 1 tablet (100 mg) by mouth once daily at bedtime., Disp: 90 tablet, Rfl: 1     Review of Systems   Constitutional: Negative.    HENT: Negative.     Eyes: Negative.    Respiratory: Negative.     Cardiovascular: Negative.    Gastrointestinal: Negative.    Endocrine: Negative.    Genitourinary: Negative.    Musculoskeletal:  Positive for arthralgias, back pain and myalgias. Negative for gait problem, joint swelling, neck pain and neck stiffness.   Skin: Negative.    Allergic/Immunologic: Negative.    Neurological: Negative.    Psychiatric/Behavioral: Negative.          Physical Exam  Vitals and nursing note reviewed.   HENT:      Head: Normocephalic.      Nose: Nose normal.   Eyes:      Extraocular Movements: Extraocular movements intact.      Conjunctiva/sclera: Conjunctivae normal.      Pupils: Pupils are equal, round, and reactive to light.   Cardiovascular:      Rate and Rhythm: Normal rate and regular rhythm.   Pulmonary:      Effort: Pulmonary effort is normal.      Breath sounds: Normal breath sounds.   Genitourinary:     Comments: Deferred  Musculoskeletal:         General: Tenderness present.  "No swelling, deformity or signs of injury.      Cervical back: No rigidity or tenderness.      Right lower leg: No edema.      Left lower leg: No edema.      Comments: \"For full disclosure, patient was asked to pull up their garment to properly visualize the spine. This is done by the patient without me touching their garment.  The spine/joints were examined using gloves.\"    Negative leg raise.  Negative for paraspinal tenderness at the lumbar region.  No radicular symptoms.  BUE 5/5, BLE 5/5.   Skin:     General: Skin is warm and dry.   Neurological:      General: No focal deficit present.      Mental Status: He is alert and oriented to person, place, and time.   Psychiatric:         Mood and Affect: Mood normal.         Behavior: Behavior normal.          Last Recorded Vitals  /78 (BP Location: Left arm, Patient Position: Sitting, BP Cuff Size: Adult)   Pulse 96   Temp 36.7 °C (98 °F) (Temporal)   Resp 15   Wt 116 kg (255 lb)   SpO2 96%  Body mass index is 35.57 kg/m².       Pain Management Panel  More data exists         Latest Ref Rng & Units 11/13/2024 6/10/2024   Pain Management Panel   Amphetamine Screen, Urine Presumptive Negative Presumptive Negative  Presumptive Negative    Barbiturate Screen, Urine Presumptive Negative Presumptive Negative  Presumptive Negative    Codeine <50 ng/mL <50  <50    Hydromorphone Urine <25 ng/mL <25  <25    Morphine  <50 ng/mL <50  <50           Relevant Results    Narrative & Impression  MRN: [id number]  Patient Name: MARLYS LIND     STUDY:  MRI L-SPINE WO;  10/15/2022 12:13 pm     INDICATION:  Chronic lower back pain with right-sided leg pain.  No known injury  M54.17: Lumbosacral neuritis.     COMPARISON:  March 2014.     ACCESSION NUMBER(S):  [id number]     ORDERING CLINICIAN:  HEATH LAGOS     TECHNIQUE:  The lumbar spine was studied in the sagital, axial and coronal planes  utiliing T1 and T2 weighted images.     FINDINGS:  The marrow signal and vertebral body " height are normal. The conus and  sacrum are normal.  Images at each interspace reveal the following:  L1/L2  Mild facet hypertrophy without canal or foraminal narrowing  L2/L3  Mild facet hypertrophy without canal or foraminal narrowing  L3/L4  Slight loss of height and signal at the intervertebral disc space.  Minimal bulging intervertebral disc and facet hypertrophy without  canal stenosis. Mild bilateral foraminal narrowing  L4/L5  Circumferential bulging intervertebral disc and bilateral facet  hypertrophy. No measurable canal stenosis. Mild bilateral foraminal  narrowing.  L5/S1  Bilateral facet hypertrophy without canal stenosis. Moderate/advanced  bilateral foraminal narrowing.        IMPRESSION:  * Lumbar spondylosis, as described above, is not measurably changed  compared to the previous exam.     The examination was interpreted [facility]  ----------------       Media Information           Assessment/Plan   Problem List Items Addressed This Visit           ICD-10-CM    Chronic back pain M54.9, G89.29    Relevant Medications    gabapentin (Neurontin) 300 mg capsule    Facet arthropathy, lumbosacral - Primary M47.817    Relevant Medications    gabapentin (Neurontin) 300 mg capsule     Other Visit Diagnoses         Codes      Medication management     Z79.899    Relevant Orders    Opiate/Opioid/Benzo Prescription Compliance    Gabapentin,Urine              Plan/Follow-up Instructions:     Continue with your prescribed medications.    Continue taking gabapentin.  I decrease it to 300 mg at bedtime.    I will see you for your follow-up visit in 3 months          Carlene Uriarte, COLIN, APRN, FNP-C    [facility]  Office #: [phone]  Fax # [phone]    ---------------------  Disclaimer: This note was created using voice recognition software. It was not corrected for typographical or grammatical errors, inadvertent word insertion, or any unintended errors. Please feel free to  contact me for clarification.  -----------------

## 2025-05-30 LAB
1OH-MIDAZOLAM UR-MCNC: NEGATIVE NG/ML
7AMINOCLONAZEPAM UR-MCNC: NEGATIVE NG/ML
A-OH ALPRAZ UR-MCNC: NEGATIVE NG/ML
A-OH-TRIAZOLAM UR-MCNC: NEGATIVE NG/ML
AMPHETAMINES UR QL: NEGATIVE NG/ML
BARBITURATES UR QL: NEGATIVE NG/ML
BZE UR QL: NEGATIVE NG/ML
CODEINE UR-MCNC: NEGATIVE NG/ML
CREAT UR-MCNC: 212.2 MG/DL
DRUG SCREEN COMMENT UR-IMP: NORMAL
EDDP UR-MCNC: NEGATIVE NG/ML
FENTANYL UR-MCNC: NEGATIVE NG/ML
GABAPENTIN UR-MCNC: NEGATIVE NG/ML
HYDROCODONE UR-MCNC: NEGATIVE NG/ML
HYDROMORPHONE UR-MCNC: NEGATIVE NG/ML
LORAZEPAM UR-MCNC: NEGATIVE NG/ML
METHADONE UR-MCNC: NEGATIVE NG/ML
MORPHINE UR-MCNC: NEGATIVE NG/ML
NORDIAZEPAM UR-MCNC: NEGATIVE NG/ML
NORFENTANYL UR-MCNC: NEGATIVE NG/ML
NORHYDROCODONE UR CFM-MCNC: NEGATIVE NG/ML
NOROXYCODONE UR CFM-MCNC: NEGATIVE NG/ML
NORTRAMADOL UR-MCNC: NEGATIVE NG/ML
OH-ETHYLFLURAZ UR-MCNC: NEGATIVE NG/ML
OXAZEPAM UR-MCNC: NEGATIVE NG/ML
OXIDANTS UR QL: NEGATIVE MCG/ML
OXYCODONE UR CFM-MCNC: NEGATIVE NG/ML
OXYMORPHONE UR CFM-MCNC: NEGATIVE NG/ML
PCP UR QL: NEGATIVE NG/ML
PH UR: 5.5 [PH] (ref 4.5–9)
QUEST 6 ACETYLMORPHINE: NEGATIVE NG/ML
QUEST NOTES AND COMMENTS: NORMAL
QUEST ZOLPIDEM: NEGATIVE NG/ML
TEMAZEPAM UR-MCNC: NEGATIVE NG/ML
THC UR QL: NEGATIVE NG/ML
TRAMADOL UR-MCNC: NEGATIVE NG/ML
ZOLPIDEM PHENYL-4-CARB UR CFM-MCNC: NEGATIVE NG/ML

## 2025-06-10 ENCOUNTER — APPOINTMENT (OUTPATIENT)
Dept: PRIMARY CARE | Facility: CLINIC | Age: 75
End: 2025-06-10
Payer: MEDICARE

## 2025-06-10 DIAGNOSIS — E55.9 VITAMIN D DEFICIENCY: ICD-10-CM

## 2025-06-10 DIAGNOSIS — E66.01 OBESITY, MORBID (MULTI): ICD-10-CM

## 2025-06-10 DIAGNOSIS — Z12.5 SCREENING FOR MALIGNANT NEOPLASM OF PROSTATE: Primary | ICD-10-CM

## 2025-06-10 DIAGNOSIS — Z12.11 SCREENING FOR MALIGNANT NEOPLASM OF COLON: ICD-10-CM

## 2025-06-10 DIAGNOSIS — N40.0 BENIGN PROSTATIC HYPERPLASIA, UNSPECIFIED WHETHER LOWER URINARY TRACT SYMPTOMS PRESENT: ICD-10-CM

## 2025-06-10 DIAGNOSIS — E11.00 TYPE 2 DIABETES MELLITUS WITH HYPEROSMOLARITY WITHOUT COMA, WITHOUT LONG-TERM CURRENT USE OF INSULIN (MULTI): ICD-10-CM

## 2025-06-10 DIAGNOSIS — E78.5 DYSLIPIDEMIA: ICD-10-CM

## 2025-06-10 DIAGNOSIS — Z09 ENCOUNTER FOR FOLLOW-UP EXAMINATION AFTER COMPLETED TREATMENT FOR CONDITIONS OTHER THAN MALIGNANT NEOPLASM: ICD-10-CM

## 2025-06-10 DIAGNOSIS — I10 BENIGN ESSENTIAL HYPERTENSION: ICD-10-CM

## 2025-06-10 PROCEDURE — 3044F HG A1C LEVEL LT 7.0%: CPT

## 2025-06-26 ENCOUNTER — APPOINTMENT (OUTPATIENT)
Dept: PRIMARY CARE | Facility: CLINIC | Age: 75
End: 2025-06-26
Payer: MEDICARE

## 2025-06-26 VITALS
HEIGHT: 71 IN | OXYGEN SATURATION: 92 % | DIASTOLIC BLOOD PRESSURE: 73 MMHG | SYSTOLIC BLOOD PRESSURE: 108 MMHG | WEIGHT: 253 LBS | BODY MASS INDEX: 35.42 KG/M2 | HEART RATE: 86 BPM

## 2025-06-26 DIAGNOSIS — F51.01 PRIMARY INSOMNIA: ICD-10-CM

## 2025-06-26 DIAGNOSIS — E11.00 TYPE 2 DIABETES MELLITUS WITH HYPEROSMOLARITY WITHOUT COMA, WITHOUT LONG-TERM CURRENT USE OF INSULIN (MULTI): ICD-10-CM

## 2025-06-26 DIAGNOSIS — E66.01 OBESITY, MORBID (MULTI): ICD-10-CM

## 2025-06-26 DIAGNOSIS — M25.551 BILATERAL HIP PAIN: ICD-10-CM

## 2025-06-26 DIAGNOSIS — N40.0 BENIGN PROSTATIC HYPERPLASIA, UNSPECIFIED WHETHER LOWER URINARY TRACT SYMPTOMS PRESENT: ICD-10-CM

## 2025-06-26 DIAGNOSIS — L82.1 STUCCO KERATOSIS: ICD-10-CM

## 2025-06-26 DIAGNOSIS — E55.9 VITAMIN D DEFICIENCY: ICD-10-CM

## 2025-06-26 DIAGNOSIS — M51.369 DEGENERATION OF INTERVERTEBRAL DISC OF LUMBAR REGION, UNSPECIFIED WHETHER PAIN PRESENT: ICD-10-CM

## 2025-06-26 DIAGNOSIS — Z12.11 SCREENING FOR MALIGNANT NEOPLASM OF COLON: ICD-10-CM

## 2025-06-26 DIAGNOSIS — M25.552 BILATERAL HIP PAIN: ICD-10-CM

## 2025-06-26 DIAGNOSIS — F32.1 CURRENT MODERATE EPISODE OF MAJOR DEPRESSIVE DISORDER WITHOUT PRIOR EPISODE (MULTI): Primary | ICD-10-CM

## 2025-06-26 LAB — POC HEMOGLOBIN A1C: 5.5 % (ref 4.2–6.5)

## 2025-06-26 PROCEDURE — 3078F DIAST BP <80 MM HG: CPT

## 2025-06-26 PROCEDURE — 1160F RVW MEDS BY RX/DR IN RCRD: CPT

## 2025-06-26 PROCEDURE — 3044F HG A1C LEVEL LT 7.0%: CPT

## 2025-06-26 PROCEDURE — 1036F TOBACCO NON-USER: CPT

## 2025-06-26 PROCEDURE — 1159F MED LIST DOCD IN RCRD: CPT

## 2025-06-26 PROCEDURE — 3074F SYST BP LT 130 MM HG: CPT

## 2025-06-26 PROCEDURE — 99214 OFFICE O/P EST MOD 30 MIN: CPT

## 2025-06-26 PROCEDURE — G2211 COMPLEX E/M VISIT ADD ON: HCPCS

## 2025-06-26 PROCEDURE — 1125F AMNT PAIN NOTED PAIN PRSNT: CPT

## 2025-06-26 PROCEDURE — 83036 HEMOGLOBIN GLYCOSYLATED A1C: CPT

## 2025-06-26 RX ORDER — TRETINOIN 0.25 MG/G
CREAM TOPICAL NIGHTLY
Qty: 45 G | Refills: 11 | Status: SHIPPED | OUTPATIENT
Start: 2025-06-26 | End: 2026-06-26

## 2025-06-26 RX ORDER — TIRZEPATIDE 10 MG/.5ML
10 INJECTION, SOLUTION SUBCUTANEOUS WEEKLY
Qty: 4 ML | Refills: 2 | Status: SHIPPED | OUTPATIENT
Start: 2025-06-26

## 2025-06-26 RX ORDER — TRAZODONE HYDROCHLORIDE 150 MG/1
150 TABLET ORAL NIGHTLY PRN
Qty: 90 TABLET | Refills: 3 | Status: SHIPPED | OUTPATIENT
Start: 2025-06-26

## 2025-06-26 ASSESSMENT — ENCOUNTER SYMPTOMS
ARTHRALGIAS: 1
BACK PAIN: 1
HEMATOLOGIC/LYMPHATIC NEGATIVE: 1
CONSTITUTIONAL NEGATIVE: 1
GASTROINTESTINAL NEGATIVE: 1
RESPIRATORY NEGATIVE: 1
ALLERGIC/IMMUNOLOGIC NEGATIVE: 1
SLEEP DISTURBANCE: 1
NEUROLOGICAL NEGATIVE: 1
CARDIOVASCULAR NEGATIVE: 1

## 2025-06-26 ASSESSMENT — PAIN SCALES - GENERAL: PAINLEVEL_OUTOF10: 4

## 2025-06-26 NOTE — PROGRESS NOTES
"Subjective   Patient ID: Vincent Ramirez \"Car\" is a 75 y.o. male who presents for Hip Pain and Medication Problem (Feels ozempic and trazodone require adjustment).  Today he is accompanied by alone.     HPI  DM  - A1c in office 5.5  - well controlled  - last A1c was 5.8 ( 3/25)  - current medication: Ozempic 2 mg weekly  - using elliptical at home , walking 1 mile a day  - Following  diabetic diet  - weight loss is stalled, switch to Mounjaro 10 mg     Stucco Keratosis  - Left ankle/foot with clusters of approx 20 eraser to pinpoint size macular papular lesions that flake off.  - he has been using pumice to  take off for a while  - start Tretinoin cream    Hip Pain  - complaining of bilateral hip pain that is like soreness  - denied any grinding  - - get bilateral hip xray    DDD Lumbar spine  - managed  - following with pain management  - has had 10 injections over 2-3 years  - 10/24 saw spinal surgeon  for chronic axial low back pain in the absence of any significant stenosis who recommended nonoperative management with combination of physical therapy for core strengthening and aqua therapy for conditioning  - current medication: Gabapentin    BPH  - current medication: Oxybutynin 5 mg, Alfuxzosin 10 mg  - wakes every 2 hours a night    Depression  Insomnia  - stable  . Patient Health Questionnaire-2 Score: 0 (6/26/2025  8:28 AM)   - current medication: Zoloft 50 mg daily, Trazodone 100 mg nightly  - having more trouble sleeping... requesting increase trazadone  --> increase Trazodone to 150 mg    Obesity  - weight 253 Lb with BMI 35.29  - has been eating healthy and exercising daily  -- change to Mounjaro 10 mg / week    Review of Systems   Constitutional: Negative.    HENT: Negative.     Respiratory: Negative.     Cardiovascular: Negative.    Gastrointestinal: Negative.    Genitourinary: Negative.    Musculoskeletal:  Positive for arthralgias (bilateral hip) and back pain.   Skin:  Positive for rash. " "  Allergic/Immunologic: Negative.    Neurological: Negative.    Hematological: Negative.    Psychiatric/Behavioral:  Positive for sleep disturbance.        Objective   /73 (BP Location: Left arm)   Pulse 86   Ht 1.803 m (5' 11\")   Wt 115 kg (253 lb)   SpO2 92%   BMI 35.29 kg/m²   BSA: 2.4 meters squared  Growth percentiles: Facility age limit for growth %adry is 20 years. Facility age limit for growth %adry is 20 years.     Physical Exam  Vitals and nursing note reviewed.   Constitutional:       Appearance: Normal appearance. He is normal weight.   HENT:      Head: Normocephalic.      Nose: Nose normal.      Mouth/Throat:      Mouth: Mucous membranes are moist.      Pharynx: Oropharynx is clear.   Eyes:      Extraocular Movements: Extraocular movements intact.      Conjunctiva/sclera: Conjunctivae normal.      Pupils: Pupils are equal, round, and reactive to light.   Cardiovascular:      Rate and Rhythm: Normal rate and regular rhythm.      Pulses: Normal pulses.      Heart sounds: Normal heart sounds. No murmur heard.  Pulmonary:      Effort: Pulmonary effort is normal. No respiratory distress.      Breath sounds: Normal breath sounds. No wheezing, rhonchi or rales.   Abdominal:      General: Abdomen is flat. Bowel sounds are normal.      Palpations: Abdomen is soft.   Musculoskeletal:         General: Normal range of motion.      Cervical back: Normal range of motion and neck supple.      Right lower leg: No edema.      Left lower leg: No edema.   Skin:     General: Skin is warm and dry.      Capillary Refill: Capillary refill takes less than 2 seconds.      Findings: Rash present.   Neurological:      General: No focal deficit present.      Mental Status: He is alert. Mental status is at baseline.   Psychiatric:         Mood and Affect: Mood normal.         Behavior: Behavior normal.         Thought Content: Thought content normal.         Judgment: Judgment normal.       /73 (BP Location: Left " "arm)   Pulse 86   Ht 1.803 m (5' 11\")   Wt 115 kg (253 lb)   SpO2 92%   BMI 35.29 kg/m²    Lab Results   Component Value Date    GLUCOSE 104 (H) 01/03/2024    CALCIUM 9.7 01/03/2024     01/03/2024    K 4.7 01/03/2024    CO2 28 01/03/2024     01/03/2024    BUN 15 01/03/2024    CREATININE 0.89 01/03/2024        Assessment/Plan   DM  - A1c in office 5.5  - well controlled  - last A1c was 5.8 ( 3/25)  - current medication: Ozempic 2 mg weekly  - using elliptical at home , walking 1 mile a day  - Following  diabetic diet  - weight loss is stalled, switch to Mounjaro 10 mg     Stucco Keratosis  - Left ankle/foot with clusters of approx 20 eraser to pinpoint size macular papular lesions that flake off.  - he has been using pumice to  take off for a while  - start Tretinoin cream    Hip Pain  - complaining of bilateral hip pain that is like soreness  - denied any grinding  - - get bilateral hip xray    DDD Lumbar spine  - managed  - following with pain management  - has had 10 injections over 2-3 years  - 10/24 saw spinal surgeon  for chronic axial low back pain in the absence of any significant stenosis who recommended nonoperative management with combination of physical therapy for core strengthening and aqua therapy for conditioning  - current medication: Gabapentin    BPH  - current medication: Oxybutynin 5 mg, Alfuxzosin 10 mg  - wakes every 2 hours a night    Depression  Insomnia  - stable  . Patient Health Questionnaire-2 Score: 0 (6/26/2025  8:28 AM)   - current medication: Zoloft 50 mg daily, Trazodone 100 mg nightly  - having more trouble sleeping... requesting increase trazadone  --> increase Trazodone to 150 mg    Obesity  - weight 253 Lb with BMI 35.29  - has been eating healthy and exercising daily  -- change to Mounjaro 10 mg / week    Problem List Items Addressed This Visit       Current moderate episode of major depressive disorder without prior episode (Multi) - Primary    Relevant Orders "    Vitamin D 25-Hydroxy,Total (for eval of Vitamin D levels)    Vitamin B12    TSH with reflex to Free T4 if abnormal    Magnesium    Iron and TIBC    Ferritin    Folate    CBC and Auto Differential    Comprehensive Metabolic Panel    Lipid Panel    POCT glycosylated hemoglobin (Hb A1C) manually resulted (Completed)    Prostate Specific Antigen, Screen    Albumin-Creatinine Ratio, Urine Random    BPH (benign prostatic hyperplasia)    Relevant Orders    Vitamin D 25-Hydroxy,Total (for eval of Vitamin D levels)    Vitamin B12    TSH with reflex to Free T4 if abnormal    Magnesium    Iron and TIBC    Ferritin    Folate    CBC and Auto Differential    Comprehensive Metabolic Panel    Lipid Panel    POCT glycosylated hemoglobin (Hb A1C) manually resulted (Completed)    Prostate Specific Antigen, Screen    Albumin-Creatinine Ratio, Urine Random    Vitamin D deficiency    Relevant Orders    Vitamin D 25-Hydroxy,Total (for eval of Vitamin D levels)    Obesity, morbid (Multi)    Relevant Medications    tirzepatide (Mounjaro) 10 mg/0.5 mL pen injector    Other Relevant Orders    Vitamin D 25-Hydroxy,Total (for eval of Vitamin D levels)    Vitamin B12    TSH with reflex to Free T4 if abnormal    Magnesium    Iron and TIBC    Ferritin    Folate    CBC and Auto Differential    Comprehensive Metabolic Panel    Lipid Panel    POCT glycosylated hemoglobin (Hb A1C) manually resulted (Completed)    Prostate Specific Antigen, Screen    Albumin-Creatinine Ratio, Urine Random    Type 2 diabetes mellitus, without long-term current use of insulin (Multi)    Relevant Medications    tirzepatide (Mounjaro) 10 mg/0.5 mL pen injector    Other Relevant Orders    Vitamin D 25-Hydroxy,Total (for eval of Vitamin D levels)    Vitamin B12    TSH with reflex to Free T4 if abnormal    Magnesium    Iron and TIBC    Ferritin    Folate    CBC and Auto Differential    Comprehensive Metabolic Panel    Lipid Panel    POCT glycosylated hemoglobin (Hb A1C)  manually resulted (Completed)    Prostate Specific Antigen, Screen    Albumin-Creatinine Ratio, Urine Random     Other Visit Diagnoses         Degeneration of intervertebral disc of lumbar region, unspecified whether pain present        Relevant Orders    Vitamin D 25-Hydroxy,Total (for eval of Vitamin D levels)    Vitamin B12    TSH with reflex to Free T4 if abnormal    Magnesium    Iron and TIBC    Ferritin    Folate    CBC and Auto Differential    Comprehensive Metabolic Panel    Lipid Panel    POCT glycosylated hemoglobin (Hb A1C) manually resulted (Completed)    Prostate Specific Antigen, Screen    Albumin-Creatinine Ratio, Urine Random      Bilateral hip pain        Relevant Orders    XR hips bilateral 2 VW w pelvis when performed      Primary insomnia        Relevant Medications    traZODone (Desyrel) 150 mg tablet      Screening for malignant neoplasm of colon        Relevant Orders    Cologuard® colon cancer screening      Stucco keratosis        Relevant Medications    tretinoin (Retin-A) 0.025 % cream    Other Relevant Orders    Referral to Dermatology        It was great to see you today!  Please continue taking your prescribed medications.   Refill have been sent to your pharmacy.    I have ordered some labs to be done as soon as you can.  We will call you with the results.    I have placed a referral to dermatology for further management.    I have placed a referral for you to have a cologaurd done as soon as you are able to.      Please get xray of the hips    Start Mounjaro  Stop Ozempic  Start Tretinoin cream    RTC in 3 months

## 2025-06-28 LAB
ALBUMIN/CREAT UR: 7 MG/G CREAT
CREAT UR-MCNC: 282 MG/DL (ref 20–320)
MICROALBUMIN UR-MCNC: 2 MG/DL

## 2025-07-07 DIAGNOSIS — E66.01 OBESITY, MORBID (MULTI): ICD-10-CM

## 2025-07-07 DIAGNOSIS — E11.00 TYPE 2 DIABETES MELLITUS WITH HYPEROSMOLARITY WITHOUT COMA, WITHOUT LONG-TERM CURRENT USE OF INSULIN (MULTI): ICD-10-CM

## 2025-07-07 RX ORDER — TIRZEPATIDE 10 MG/.5ML
10 INJECTION, SOLUTION SUBCUTANEOUS WEEKLY
Qty: 4 ML | Refills: 2 | Status: SHIPPED | OUTPATIENT
Start: 2025-07-07

## 2025-07-15 ENCOUNTER — HOSPITAL ENCOUNTER (OUTPATIENT)
Dept: RADIOLOGY | Facility: HOSPITAL | Age: 75
Discharge: HOME | End: 2025-07-15
Payer: MEDICARE

## 2025-07-15 DIAGNOSIS — E66.01 OBESITY, MORBID (MULTI): ICD-10-CM

## 2025-07-15 DIAGNOSIS — M25.551 BILATERAL HIP PAIN: ICD-10-CM

## 2025-07-15 DIAGNOSIS — N40.0 BENIGN PROSTATIC HYPERPLASIA, UNSPECIFIED WHETHER LOWER URINARY TRACT SYMPTOMS PRESENT: ICD-10-CM

## 2025-07-15 DIAGNOSIS — Z09 ENCOUNTER FOR FOLLOW-UP EXAMINATION AFTER COMPLETED TREATMENT FOR CONDITIONS OTHER THAN MALIGNANT NEOPLASM: ICD-10-CM

## 2025-07-15 DIAGNOSIS — M25.552 BILATERAL HIP PAIN: ICD-10-CM

## 2025-07-15 PROCEDURE — 73521 X-RAY EXAM HIPS BI 2 VIEWS: CPT | Mod: BILATERAL PROCEDURE | Performed by: RADIOLOGY

## 2025-07-15 PROCEDURE — 77080 DXA BONE DENSITY AXIAL: CPT | Performed by: RADIOLOGY

## 2025-07-15 PROCEDURE — 73521 X-RAY EXAM HIPS BI 2 VIEWS: CPT

## 2025-07-15 PROCEDURE — 77080 DXA BONE DENSITY AXIAL: CPT

## 2025-07-25 LAB
25(OH)D3+25(OH)D2 SERPL-MCNC: 61 NG/ML (ref 30–100)
ALBUMIN SERPL-MCNC: 4.5 G/DL (ref 3.6–5.1)
ALP SERPL-CCNC: 72 U/L (ref 35–144)
ALT SERPL-CCNC: 25 U/L (ref 9–46)
ANION GAP SERPL CALCULATED.4IONS-SCNC: 12 MMOL/L (CALC) (ref 7–17)
AST SERPL-CCNC: 17 U/L (ref 10–35)
BASOPHILS # BLD AUTO: 89 CELLS/UL (ref 0–200)
BASOPHILS NFR BLD AUTO: 1 %
BILIRUB SERPL-MCNC: 2 MG/DL (ref 0.2–1.2)
BUN SERPL-MCNC: 11 MG/DL (ref 7–25)
CALCIUM SERPL-MCNC: 10.1 MG/DL (ref 8.6–10.3)
CHLORIDE SERPL-SCNC: 106 MMOL/L (ref 98–110)
CHOLEST SERPL-MCNC: 172 MG/DL
CHOLEST/HDLC SERPL: 3.4 (CALC)
CO2 SERPL-SCNC: 20 MMOL/L (ref 20–32)
CREAT SERPL-MCNC: 1.01 MG/DL (ref 0.7–1.28)
EGFRCR SERPLBLD CKD-EPI 2021: 78 ML/MIN/1.73M2
EOSINOPHIL # BLD AUTO: 196 CELLS/UL (ref 15–500)
EOSINOPHIL NFR BLD AUTO: 2.2 %
ERYTHROCYTE [DISTWIDTH] IN BLOOD BY AUTOMATED COUNT: 13.1 % (ref 11–15)
FERRITIN SERPL-MCNC: 110 NG/ML (ref 24–380)
FOLATE SERPL-MCNC: 11.4 NG/ML
GLUCOSE SERPL-MCNC: 143 MG/DL (ref 65–99)
HCT VFR BLD AUTO: 49.9 % (ref 38.5–50)
HDLC SERPL-MCNC: 51 MG/DL
HGB BLD-MCNC: 16.6 G/DL (ref 13.2–17.1)
IRON SATN MFR SERPL: 48 % (CALC) (ref 20–48)
IRON SERPL-MCNC: 169 MCG/DL (ref 50–180)
LDLC SERPL CALC-MCNC: 94 MG/DL (CALC)
LYMPHOCYTES # BLD AUTO: 1869 CELLS/UL (ref 850–3900)
LYMPHOCYTES NFR BLD AUTO: 21 %
MAGNESIUM SERPL-MCNC: 2.2 MG/DL (ref 1.5–2.5)
MCH RBC QN AUTO: 30.6 PG (ref 27–33)
MCHC RBC AUTO-ENTMCNC: 33.3 G/DL (ref 32–36)
MCV RBC AUTO: 92.1 FL (ref 80–100)
MONOCYTES # BLD AUTO: 712 CELLS/UL (ref 200–950)
MONOCYTES NFR BLD AUTO: 8 %
NEUTROPHILS # BLD AUTO: 6034 CELLS/UL (ref 1500–7800)
NEUTROPHILS NFR BLD AUTO: 67.8 %
NONHDLC SERPL-MCNC: 121 MG/DL (CALC)
PLATELET # BLD AUTO: 217 THOUSAND/UL (ref 140–400)
PMV BLD REES-ECKER: 10.4 FL (ref 7.5–12.5)
POTASSIUM SERPL-SCNC: 4.3 MMOL/L (ref 3.5–5.3)
PROT SERPL-MCNC: 7.1 G/DL (ref 6.1–8.1)
PSA SERPL-MCNC: 7.94 NG/ML
RBC # BLD AUTO: 5.42 MILLION/UL (ref 4.2–5.8)
SODIUM SERPL-SCNC: 138 MMOL/L (ref 135–146)
TIBC SERPL-MCNC: 350 MCG/DL (CALC) (ref 250–425)
TRIGL SERPL-MCNC: 170 MG/DL
TSH SERPL-ACNC: 1.26 MIU/L (ref 0.4–4.5)
VIT B12 SERPL-MCNC: 313 PG/ML (ref 200–1100)
WBC # BLD AUTO: 8.9 THOUSAND/UL (ref 3.8–10.8)

## 2025-08-05 ENCOUNTER — OFFICE VISIT (OUTPATIENT)
Dept: PRIMARY CARE | Facility: CLINIC | Age: 75
End: 2025-08-05
Payer: MEDICARE

## 2025-08-05 VITALS
WEIGHT: 254 LBS | OXYGEN SATURATION: 91 % | BODY MASS INDEX: 35.56 KG/M2 | HEIGHT: 71 IN | DIASTOLIC BLOOD PRESSURE: 70 MMHG | HEART RATE: 72 BPM | SYSTOLIC BLOOD PRESSURE: 105 MMHG

## 2025-08-05 DIAGNOSIS — F51.01 PRIMARY INSOMNIA: ICD-10-CM

## 2025-08-05 DIAGNOSIS — L82.1 STUCCO KERATOSIS: ICD-10-CM

## 2025-08-05 DIAGNOSIS — E53.8 B12 DEFICIENCY: Primary | ICD-10-CM

## 2025-08-05 DIAGNOSIS — M25.552 BILATERAL HIP PAIN: ICD-10-CM

## 2025-08-05 DIAGNOSIS — M51.369 DEGENERATION OF INTERVERTEBRAL DISC OF LUMBAR REGION, UNSPECIFIED WHETHER PAIN PRESENT: ICD-10-CM

## 2025-08-05 DIAGNOSIS — E78.1 HYPERTRIGLYCERIDEMIA: ICD-10-CM

## 2025-08-05 DIAGNOSIS — E66.01 OBESITY, MORBID (MULTI): ICD-10-CM

## 2025-08-05 DIAGNOSIS — E55.9 VITAMIN D DEFICIENCY: ICD-10-CM

## 2025-08-05 DIAGNOSIS — N40.0 BENIGN PROSTATIC HYPERPLASIA, UNSPECIFIED WHETHER LOWER URINARY TRACT SYMPTOMS PRESENT: ICD-10-CM

## 2025-08-05 DIAGNOSIS — E11.00 TYPE 2 DIABETES MELLITUS WITH HYPEROSMOLARITY WITHOUT COMA, WITHOUT LONG-TERM CURRENT USE OF INSULIN (MULTI): ICD-10-CM

## 2025-08-05 DIAGNOSIS — M25.551 BILATERAL HIP PAIN: ICD-10-CM

## 2025-08-05 DIAGNOSIS — R42 DIZZINESS: ICD-10-CM

## 2025-08-05 PROCEDURE — 3078F DIAST BP <80 MM HG: CPT

## 2025-08-05 PROCEDURE — 1159F MED LIST DOCD IN RCRD: CPT

## 2025-08-05 PROCEDURE — 1126F AMNT PAIN NOTED NONE PRSNT: CPT

## 2025-08-05 PROCEDURE — 3044F HG A1C LEVEL LT 7.0%: CPT

## 2025-08-05 PROCEDURE — 1160F RVW MEDS BY RX/DR IN RCRD: CPT

## 2025-08-05 PROCEDURE — 99214 OFFICE O/P EST MOD 30 MIN: CPT

## 2025-08-05 PROCEDURE — 1036F TOBACCO NON-USER: CPT

## 2025-08-05 PROCEDURE — 3074F SYST BP LT 130 MM HG: CPT

## 2025-08-05 RX ORDER — TIRZEPATIDE 12.5 MG/.5ML
12.5 INJECTION, SOLUTION SUBCUTANEOUS WEEKLY
Qty: 6 ML | Refills: 3 | Status: SHIPPED | OUTPATIENT
Start: 2025-08-05

## 2025-08-05 RX ORDER — VIT C/E/ZN/COPPR/LUTEIN/ZEAXAN 250MG-90MG
1000 CAPSULE ORAL DAILY
Qty: 180 TABLET | Refills: 3 | Status: SHIPPED | OUTPATIENT
Start: 2025-08-05 | End: 2026-08-05

## 2025-08-05 RX ORDER — MECLIZINE HCL 12.5 MG 12.5 MG/1
12.5 TABLET ORAL 3 TIMES DAILY PRN
Qty: 60 TABLET | Refills: 1 | Status: SHIPPED | OUTPATIENT
Start: 2025-08-05 | End: 2026-08-05

## 2025-08-05 SDOH — ECONOMIC STABILITY: FOOD INSECURITY: WITHIN THE PAST 12 MONTHS, YOU WORRIED THAT YOUR FOOD WOULD RUN OUT BEFORE YOU GOT MONEY TO BUY MORE.: NEVER TRUE

## 2025-08-05 SDOH — ECONOMIC STABILITY: FOOD INSECURITY: WITHIN THE PAST 12 MONTHS, THE FOOD YOU BOUGHT JUST DIDN'T LAST AND YOU DIDN'T HAVE MONEY TO GET MORE.: NEVER TRUE

## 2025-08-05 ASSESSMENT — ENCOUNTER SYMPTOMS
ARTHRALGIAS: 1
BACK PAIN: 1
SORE THROAT: 0
CARDIOVASCULAR NEGATIVE: 1
WEAKNESS: 0
DIAPHORESIS: 0
FACIAL ASYMMETRY: 0
COUGH: 0
HEADACHES: 0
NUMBNESS: 0
TREMORS: 0
CONFUSION: 0
ALLERGIC/IMMUNOLOGIC NEGATIVE: 1
PALPITATIONS: 0
WHEEZING: 0
CONSTITUTIONAL NEGATIVE: 1
RESPIRATORY NEGATIVE: 1
SHORTNESS OF BREATH: 0
HEMATOLOGIC/LYMPHATIC NEGATIVE: 1
DIZZINESS: 1
GASTROINTESTINAL NEGATIVE: 1
SEIZURES: 0
SLEEP DISTURBANCE: 1
SPEECH DIFFICULTY: 0
FATIGUE: 0
DECREASED CONCENTRATION: 0

## 2025-08-05 ASSESSMENT — PATIENT HEALTH QUESTIONNAIRE - PHQ9
1. LITTLE INTEREST OR PLEASURE IN DOING THINGS: NOT AT ALL
SUM OF ALL RESPONSES TO PHQ9 QUESTIONS 1 AND 2: 0
2. FEELING DOWN, DEPRESSED OR HOPELESS: NOT AT ALL

## 2025-08-05 ASSESSMENT — PAIN SCALES - GENERAL: PAINLEVEL_OUTOF10: 0-NO PAIN

## 2025-08-05 NOTE — PROGRESS NOTES
"Subjective   Patient ID: Vincent Ramirez \"Car\" is a 75 y.o. male who presents for Dizziness.  Today he is accompanied by alone.     HPI  Dizziness  - unsteady, can't walk a straight line... worse over weekend  - had eyes check a year ago  - denied blurry vision, headache or confusion  - no weakness, no numbness or tingling or weakness  - stated last two days it has gotten better  - denied any ear pain or recent sinuses congestion  - no ear ringing  -- Consider CT head, consider ENT referral  - advised him to call Thursday if not improving... go to the Er with worsening symptoms  -- start meclizine 12.5 mg    DM  - A1c in office 5.5  - well controlled  - last A1c was 5.8 ( 3/25)  - current medication: Mounjaro 10 mg weekly  - using elliptical at home , walking 1 mile a day  - Following  diabetic diet  - weight loss is stalled, switched to Mounjaro 10 mg from Ozempic  --> increase Mounjaro to 12.5 mg weekly     Hypertriglyceridemia  - 7/24/25 triglycerides 170  - follow a low fat diet, avoid fried processed food    B12 insuffiencey  Vit d deficiency  - last labs 7/24/25 B12 313,  Vit D 61  - current medication: B12 1000 mcg daily, Vit D 50,000 units weekly    Stucco Keratosis  - Left ankle/foot with clusters of approx 20 eraser to pinpoint size macular papular lesions that flake off.  - he has been using pumice to  take off for a while  - start Tretinoin cream     Hip Pain  - complaining of bilateral hip pain that is like soreness  - denied any grinding  - bilateral hip xray 7/16/25   FINDINGS:  There is no fracture. There is no dislocation. There are no  degenerative changes. There is no lytic or sclerotic lesion. There is  no soft tissue abnormality seen.         DDD Lumbar spine  - managed  - following with pain management  - has had 10 injections over 2-3 years  - 10/24 saw spinal surgeon  for chronic axial low back pain in the absence of any significant stenosis who recommended nonoperative management with " "combination of physical therapy for core strengthening and aqua therapy for conditioning  - current medication: Gabapentin     BPH  - current medication: Oxybutynin 5 mg, Alfuxzosin 10 mg  - wakes every 2 hours a night  - PSA 7.94  - to follow up with urology     Depression  Insomnia  - stable  - improved  . Patient Health Questionnaire-2 Score: 0 (6/26/2025  8:28 AM)   - current medication: Zoloft 50 mg daily, Trazodone 150 mg nightly    Obesity  - weight 254 Lb with BMI 35.29  - has been eating healthy and exercising daily  -- current medication: Mounjaro 10 mg / week  --> increase Mounjaro to 12.5 mg    Review of Systems   Constitutional: Negative.  Negative for diaphoresis and fatigue.   HENT:  Positive for postnasal drip. Negative for congestion, ear pain, hearing loss, sneezing, sore throat and tinnitus.    Eyes:  Negative for visual disturbance.   Respiratory: Negative.  Negative for cough, shortness of breath and wheezing.    Cardiovascular: Negative.  Negative for chest pain, palpitations and leg swelling.   Gastrointestinal: Negative.    Genitourinary: Negative.    Musculoskeletal:  Positive for arthralgias and back pain.   Skin:  Positive for rash.   Allergic/Immunologic: Negative.    Neurological:  Positive for dizziness. Negative for tremors, seizures, syncope, facial asymmetry, speech difficulty, weakness, numbness and headaches.   Hematological: Negative.    Psychiatric/Behavioral:  Positive for sleep disturbance. Negative for confusion and decreased concentration.        Objective   /70 (BP Location: Right arm)   Pulse 72   Ht 1.803 m (5' 11\")   Wt 115 kg (254 lb)   SpO2 91%   BMI 35.43 kg/m²   BSA: 2.4 meters squared  Growth percentiles: Facility age limit for growth %adry is 20 years. Facility age limit for growth %adry is 20 years.     Physical Exam  Vitals and nursing note reviewed.   Constitutional:       Appearance: Normal appearance. He is normal weight.   HENT:      Head: " "Normocephalic.      Right Ear: Tympanic membrane and ear canal normal.      Left Ear: Tympanic membrane and ear canal normal.      Nose: Nose normal. No congestion.      Mouth/Throat:      Mouth: Mucous membranes are moist.      Pharynx: Oropharynx is clear.     Eyes:      Extraocular Movements: Extraocular movements intact.      Conjunctiva/sclera: Conjunctivae normal.      Pupils: Pupils are equal, round, and reactive to light.       Cardiovascular:      Rate and Rhythm: Normal rate and regular rhythm.      Pulses: Normal pulses.      Heart sounds: Normal heart sounds.   Pulmonary:      Effort: Pulmonary effort is normal. No respiratory distress.      Breath sounds: Normal breath sounds. No wheezing, rhonchi or rales.   Abdominal:      General: Abdomen is flat. Bowel sounds are normal.      Palpations: Abdomen is soft.     Musculoskeletal:         General: Normal range of motion.      Cervical back: Normal range of motion and neck supple.      Right lower leg: No edema.      Left lower leg: No edema.     Skin:     General: Skin is warm and dry.      Capillary Refill: Capillary refill takes less than 2 seconds.     Neurological:      General: No focal deficit present.      Mental Status: He is alert and oriented to person, place, and time. Mental status is at baseline.      Cranial Nerves: Cranial nerves 2-12 are intact.      Motor: Motor function is intact.     Psychiatric:         Mood and Affect: Mood normal.         Behavior: Behavior normal.         Thought Content: Thought content normal.         Judgment: Judgment normal.       /70 (BP Location: Right arm)   Pulse 72   Ht 1.803 m (5' 11\")   Wt 115 kg (254 lb)   SpO2 91%   BMI 35.43 kg/m²    Lab Results   Component Value Date    GLUCOSE 143 (H) 07/24/2025    CALCIUM 10.1 07/24/2025     07/24/2025    K 4.3 07/24/2025    CO2 20 07/24/2025     07/24/2025    BUN 11 07/24/2025    CREATININE 1.01 07/24/2025        Assessment/Plan "   Dizziness  - unsteady, can't walk a straight line... worse over weekend  - had eyes check a year ago  - denied blurry vision, headache or confusion  - no weakness, no numbness or tingling or weakness  - stated last two days it has gotten better  - denied any ear pain or recent sinuses congestion  - no ear ringing  -- Consider CT head, consider ENT referral  - advised him to call Thursday if not improving... go to the Er with worsening symptoms  -- start meclizine 12.5 mg    DM  - A1c in office 5.5  - well controlled  - last A1c was 5.8 ( 3/25)  - current medication: Mounjaro 10 mg weekly  - using elliptical at home , walking 1 mile a day  - Following  diabetic diet  - weight loss is stalled, switched to Mounjaro 10 mg from Ozempic  --> increase Mounjaro to 12.5 mg weekly     Hypertriglyceridemia  - 7/24/25 triglycerides 170  - follow a low fat diet, avoid fried processed food    B12 insuffiencey  Vit d deficiency  - last labs 7/24/25 B12 313,  Vit D 61  - current medication: B12 1000 mcg daily, Vit D 50,000 units weekly    Stucco Keratosis  - improving  - Left ankle/foot with clusters of approx 20 eraser to pinpoint size macular papular lesions that flake off.  - he has been using pumice to  take off for a while  - start Tretinoin cream     Hip Pain  - complaining of bilateral hip pain that is like soreness  - denied any grinding  - bilateral hip xray 7/16/25   FINDINGS:  There is no fracture. There is no dislocation. There are no  degenerative changes. There is no lytic or sclerotic lesion. There is  no soft tissue abnormality seen.         DDD Lumbar spine  - managed  - following with pain management  - has had 10 injections over 2-3 years  - 10/24 saw spinal surgeon  for chronic axial low back pain in the absence of any significant stenosis who recommended nonoperative management with combination of physical therapy for core strengthening and aqua therapy for conditioning  - current medication: Gabapentin      BPH  - current medication: Oxybutynin 5 mg, Alfuxzosin 10 mg  - wakes every 2 hours a night  - PSA 7.94  - to follow up with urology     Depression  Insomnia  - stable  - improved  . Patient Health Questionnaire-2 Score: 0 (6/26/2025  8:28 AM)   - current medication: Zoloft 50 mg daily, Trazodone 150 mg nightly    Obesity  - weight 254 Lb with BMI 35.29  - has been eating healthy and exercising daily  -- current medication: Mounjaro 10 mg / week  --> increase Mounjaro to 12.5 mg      Problem List Items Addressed This Visit       BPH (benign prostatic hyperplasia)    Relevant Orders    Referral to Urology    Vitamin D deficiency    Obesity, morbid (Multi)    Relevant Medications    tirzepatide (Mounjaro) 12.5 mg/0.5 mL pen injector    Type 2 diabetes mellitus, without long-term current use of insulin (Multi)    Relevant Medications    tirzepatide (Mounjaro) 12.5 mg/0.5 mL pen injector    Hypertriglyceridemia    B12 deficiency - Primary    Relevant Medications    cyanocobalamin (Vitamin B-12) 500 mcg tablet     Other Visit Diagnoses         Degeneration of intervertebral disc of lumbar region, unspecified whether pain present          Bilateral hip pain          Primary insomnia          Stucco keratosis              Patient was identified as a fall risk. Risk prevention instructions provided.    It was great to see you today!  Please continue taking your prescribed medications.   Refill have been sent to your pharmacy.    Increase Mounjaro to 12.5 mg  Start Meclizine as needed for dizziness    Call office Thursday if dizziness is not better... consider CT of head, ENT referral    RTC as scheduled

## 2025-08-05 NOTE — PATIENT INSTRUCTIONS
- advised him to call Thursday if not improving... go to the Er with worsening symptoms    -- start meclizine 12.5 mg      Ways to Help Prevent Falls at Home    Quick Tips   ? Ask for help if you need it. Most people want to help!   ? Get up slowly after sitting or laying down   ? Wear a medical alert device or keep cell phone in your pocket   ? Use night lights, especially areas near a bathroom   ? Keep the items you use often within reach on a small stool or end table   ? Use an assistive device such as walker or cane, as directed by provider/physical therapy   ? Use a non-slip mat and grab bars in your bathroom. Look for home health sections for best options     Other Areas to Focus On   ? Exercise and nutrition: Regular exercise or taking a falls prevention class are great ways improve strength and balance. Don’t forget to stay hydrated and bring a snack!   ? Medicine side effects: Some medicines can make you sleepy or dizzy, which could cause a fall. Ask your healthcare provider about the side effects your medicines could cause. Be sure to let them know if you take any vitamins or supplements as well.   ? Tripping hazards: Remove items you could trip on, such as loose mats, rugs, cords, and clutter. Wear closed toe shoes with rubber soles.   ? Health and wellness: Get regular checkups with your healthcare provider, plus routine vision and hearing screenings. Talk with your healthcare provider about:   o Your medicines and the possible side effects - bring them in a bag if that is easier!   o Problems with balance or feeling dizzy   o Ways to promote bone health, such as Vitamin D and calcium supplements   o Questions or concerns about falling     *Ask your healthcare team if you have questions     [facility], 2022

## 2025-08-27 ENCOUNTER — COMPREHENSIVE EXAM (OUTPATIENT)
Age: 75
End: 2025-08-27

## 2025-08-27 ENCOUNTER — APPOINTMENT (OUTPATIENT)
Dept: PAIN MEDICINE | Facility: HOSPITAL | Age: 75
End: 2025-08-27
Payer: MEDICARE

## 2025-08-29 ENCOUNTER — OFFICE VISIT (OUTPATIENT)
Dept: PAIN MEDICINE | Facility: HOSPITAL | Age: 75
End: 2025-08-29
Payer: MEDICARE

## 2025-08-29 VITALS
SYSTOLIC BLOOD PRESSURE: 111 MMHG | WEIGHT: 251 LBS | RESPIRATION RATE: 14 BRPM | BODY MASS INDEX: 35.14 KG/M2 | OXYGEN SATURATION: 97 % | TEMPERATURE: 96.5 F | HEIGHT: 71 IN | DIASTOLIC BLOOD PRESSURE: 65 MMHG | HEART RATE: 83 BPM

## 2025-08-29 DIAGNOSIS — G89.29 CHRONIC BILATERAL LOW BACK PAIN WITHOUT SCIATICA: ICD-10-CM

## 2025-08-29 DIAGNOSIS — M47.817 FACET ARTHROPATHY, LUMBOSACRAL: Primary | ICD-10-CM

## 2025-08-29 DIAGNOSIS — M54.50 CHRONIC BILATERAL LOW BACK PAIN WITHOUT SCIATICA: ICD-10-CM

## 2025-08-29 PROCEDURE — 99213 OFFICE O/P EST LOW 20 MIN: CPT | Performed by: NURSE PRACTITIONER

## 2025-08-29 PROCEDURE — 1160F RVW MEDS BY RX/DR IN RCRD: CPT | Performed by: NURSE PRACTITIONER

## 2025-08-29 PROCEDURE — 99212 OFFICE O/P EST SF 10 MIN: CPT

## 2025-08-29 PROCEDURE — G2211 COMPLEX E/M VISIT ADD ON: HCPCS | Performed by: NURSE PRACTITIONER

## 2025-08-29 PROCEDURE — 3044F HG A1C LEVEL LT 7.0%: CPT | Performed by: NURSE PRACTITIONER

## 2025-08-29 PROCEDURE — 1125F AMNT PAIN NOTED PAIN PRSNT: CPT | Performed by: NURSE PRACTITIONER

## 2025-08-29 PROCEDURE — 1036F TOBACCO NON-USER: CPT | Performed by: NURSE PRACTITIONER

## 2025-08-29 PROCEDURE — 1159F MED LIST DOCD IN RCRD: CPT | Performed by: NURSE PRACTITIONER

## 2025-08-29 PROCEDURE — 3078F DIAST BP <80 MM HG: CPT | Performed by: NURSE PRACTITIONER

## 2025-08-29 PROCEDURE — 3074F SYST BP LT 130 MM HG: CPT | Performed by: NURSE PRACTITIONER

## 2025-08-29 RX ORDER — GABAPENTIN 300 MG/1
300 CAPSULE ORAL 2 TIMES DAILY
Qty: 60 CAPSULE | Refills: 2 | Status: SHIPPED | OUTPATIENT
Start: 2025-08-29

## 2025-08-29 ASSESSMENT — ENCOUNTER SYMPTOMS
BACK PAIN: 1
PSYCHIATRIC NEGATIVE: 1
NEUROLOGICAL NEGATIVE: 1
ARTHRALGIAS: 1
NECK PAIN: 0
CARDIOVASCULAR NEGATIVE: 1
ENDOCRINE NEGATIVE: 1
JOINT SWELLING: 0
EYES NEGATIVE: 1
NECK STIFFNESS: 0
RESPIRATORY NEGATIVE: 1
ALLERGIC/IMMUNOLOGIC NEGATIVE: 1
MYALGIAS: 1
GASTROINTESTINAL NEGATIVE: 1
CONSTITUTIONAL NEGATIVE: 1

## 2025-08-29 ASSESSMENT — PAIN SCALES - GENERAL: PAINLEVEL_OUTOF10: 4

## 2025-09-02 LAB — NONINV COLON CA DNA+OCC BLD SCRN STL QL: NEGATIVE

## 2025-09-16 ENCOUNTER — APPOINTMENT (OUTPATIENT)
Dept: PRIMARY CARE | Facility: CLINIC | Age: 75
End: 2025-09-16
Payer: MEDICARE

## 2025-09-29 ENCOUNTER — APPOINTMENT (OUTPATIENT)
Dept: PRIMARY CARE | Facility: CLINIC | Age: 75
End: 2025-09-29
Payer: MEDICARE